# Patient Record
Sex: MALE | Race: WHITE | Employment: OTHER | URBAN - METROPOLITAN AREA
[De-identification: names, ages, dates, MRNs, and addresses within clinical notes are randomized per-mention and may not be internally consistent; named-entity substitution may affect disease eponyms.]

---

## 2019-02-06 RX ORDER — DOFETILIDE 0.5 MG/1
500 CAPSULE ORAL 2 TIMES DAILY
COMMUNITY
Start: 2018-01-19 | End: 2021-04-14

## 2019-02-06 RX ORDER — RANITIDINE 150 MG/1
150 TABLET ORAL DAILY PRN
COMMUNITY
End: 2021-04-14

## 2019-02-06 RX ORDER — MULTIVIT WITH MINERALS/LUTEIN
1000 TABLET ORAL DAILY
COMMUNITY
End: 2019-08-29

## 2019-02-06 RX ORDER — CARVEDILOL 6.25 MG/1
6.25 TABLET ORAL 2 TIMES DAILY
Status: ON HOLD | COMMUNITY
Start: 2017-05-15 | End: 2021-04-18 | Stop reason: HOSPADM

## 2019-02-06 RX ORDER — LEVOTHYROXINE SODIUM 0.07 MG/1
75 TABLET ORAL DAILY
COMMUNITY
Start: 2017-10-02

## 2019-02-06 RX ORDER — GABAPENTIN 100 MG/1
100 CAPSULE ORAL NIGHTLY PRN
COMMUNITY
End: 2019-05-30

## 2019-02-13 ENCOUNTER — INITIAL CONSULT (OUTPATIENT)
Dept: BARIATRICS/WEIGHT MGMT | Age: 64
End: 2019-02-13
Payer: MEDICARE

## 2019-02-13 VITALS
DIASTOLIC BLOOD PRESSURE: 109 MMHG | BODY MASS INDEX: 39.17 KG/M2 | TEMPERATURE: 96.4 F | RESPIRATION RATE: 20 BRPM | SYSTOLIC BLOOD PRESSURE: 173 MMHG | HEART RATE: 74 BPM | WEIGHT: 315 LBS | HEIGHT: 75 IN

## 2019-02-13 DIAGNOSIS — K91.2 MALNUTRITION FOLLOWING GASTROINTESTINAL SURGERY: Primary | ICD-10-CM

## 2019-02-13 PROCEDURE — 99204 OFFICE O/P NEW MOD 45 MIN: CPT | Performed by: SURGERY

## 2019-02-13 PROCEDURE — 99203 OFFICE O/P NEW LOW 30 MIN: CPT

## 2019-02-14 ENCOUNTER — PREP FOR PROCEDURE (OUTPATIENT)
Dept: SURGERY | Age: 64
End: 2019-02-14

## 2019-02-14 RX ORDER — SODIUM CHLORIDE, SODIUM LACTATE, POTASSIUM CHLORIDE, CALCIUM CHLORIDE 600; 310; 30; 20 MG/100ML; MG/100ML; MG/100ML; MG/100ML
INJECTION, SOLUTION INTRAVENOUS CONTINUOUS
Status: CANCELLED | OUTPATIENT
Start: 2019-02-14

## 2019-02-14 RX ORDER — 0.9 % SODIUM CHLORIDE 0.9 %
10 VIAL (ML) INJECTION PRN
Status: CANCELLED | OUTPATIENT
Start: 2019-02-14

## 2019-02-14 RX ORDER — 0.9 % SODIUM CHLORIDE 0.9 %
10 VIAL (ML) INJECTION EVERY 12 HOURS SCHEDULED
Status: CANCELLED | OUTPATIENT
Start: 2019-02-14

## 2019-03-08 ENCOUNTER — ANESTHESIA (OUTPATIENT)
Dept: ENDOSCOPY | Age: 64
End: 2019-03-08
Payer: MEDICARE

## 2019-03-08 ENCOUNTER — HOSPITAL ENCOUNTER (OUTPATIENT)
Age: 64
Setting detail: OUTPATIENT SURGERY
Discharge: HOME | End: 2019-03-08
Attending: SURGERY | Admitting: SURGERY
Payer: MEDICARE

## 2019-03-08 ENCOUNTER — ANESTHESIA EVENT (OUTPATIENT)
Dept: ENDOSCOPY | Age: 64
End: 2019-03-08
Payer: MEDICARE

## 2019-03-08 VITALS
HEIGHT: 75 IN | WEIGHT: 315 LBS | OXYGEN SATURATION: 94 % | HEART RATE: 59 BPM | RESPIRATION RATE: 20 BRPM | TEMPERATURE: 97.2 F | SYSTOLIC BLOOD PRESSURE: 146 MMHG | DIASTOLIC BLOOD PRESSURE: 84 MMHG | BODY MASS INDEX: 39.17 KG/M2

## 2019-03-08 VITALS
DIASTOLIC BLOOD PRESSURE: 96 MMHG | RESPIRATION RATE: 4 BRPM | SYSTOLIC BLOOD PRESSURE: 148 MMHG | OXYGEN SATURATION: 92 %

## 2019-03-08 DIAGNOSIS — K91.2 MALNUTRITION FOLLOWING GASTROINTESTINAL SURGERY: ICD-10-CM

## 2019-03-08 LAB
ALBUMIN SERPL-MCNC: 4.3 G/DL (ref 3.5–5.2)
ALP BLD-CCNC: 100 U/L (ref 40–129)
ALT SERPL-CCNC: 16 U/L (ref 0–40)
ANION GAP SERPL CALCULATED.3IONS-SCNC: 12 MMOL/L (ref 7–16)
AST SERPL-CCNC: 20 U/L (ref 0–39)
BILIRUB SERPL-MCNC: 0.6 MG/DL (ref 0–1.2)
BUN BLDV-MCNC: 14 MG/DL (ref 8–23)
CALCIUM SERPL-MCNC: 9.7 MG/DL (ref 8.6–10.2)
CHLORIDE BLD-SCNC: 102 MMOL/L (ref 98–107)
CHOLESTEROL, TOTAL: 199 MG/DL (ref 0–199)
CO2: 23 MMOL/L (ref 22–29)
CREAT SERPL-MCNC: 1 MG/DL (ref 0.7–1.2)
FERRITIN: 101 NG/ML
FOLATE: 11.5 NG/ML (ref 4.8–24.2)
GFR AFRICAN AMERICAN: >60
GFR NON-AFRICAN AMERICAN: >60 ML/MIN/1.73
GLUCOSE BLD-MCNC: 107 MG/DL (ref 74–99)
HCT VFR BLD CALC: 44.7 % (ref 37–54)
HEMOGLOBIN: 15 G/DL (ref 12.5–16.5)
MCH RBC QN AUTO: 29.2 PG (ref 26–35)
MCHC RBC AUTO-ENTMCNC: 33.6 % (ref 32–34.5)
MCV RBC AUTO: 87.1 FL (ref 80–99.9)
PDW BLD-RTO: 13.2 FL (ref 11.5–15)
PLATELET # BLD: 331 E9/L (ref 130–450)
PMV BLD AUTO: 9 FL (ref 7–12)
POTASSIUM SERPL-SCNC: 3.8 MMOL/L (ref 3.5–5)
PREALBUMIN: 25 MG/DL (ref 20–40)
RBC # BLD: 5.13 E12/L (ref 3.8–5.8)
SODIUM BLD-SCNC: 137 MMOL/L (ref 132–146)
TOTAL PROTEIN: 8.5 G/DL (ref 6.4–8.3)
TRIGL SERPL-MCNC: 134 MG/DL (ref 0–149)
VITAMIN B-12: 853 PG/ML (ref 211–946)
VITAMIN D 25-HYDROXY: 37 NG/ML (ref 30–100)
WBC # BLD: 5.6 E9/L (ref 4.5–11.5)

## 2019-03-08 PROCEDURE — 3609012400 HC EGD TRANSORAL BIOPSY SINGLE/MULTIPLE: Performed by: SURGERY

## 2019-03-08 PROCEDURE — 2709999900 HC NON-CHARGEABLE SUPPLY: Performed by: SURGERY

## 2019-03-08 PROCEDURE — 36415 COLL VENOUS BLD VENIPUNCTURE: CPT

## 2019-03-08 PROCEDURE — 43239 EGD BIOPSY SINGLE/MULTIPLE: CPT | Performed by: SURGERY

## 2019-03-08 PROCEDURE — 82746 ASSAY OF FOLIC ACID SERUM: CPT

## 2019-03-08 PROCEDURE — 82465 ASSAY BLD/SERUM CHOLESTEROL: CPT

## 2019-03-08 PROCEDURE — 84630 ASSAY OF ZINC: CPT

## 2019-03-08 PROCEDURE — 88342 IMHCHEM/IMCYTCHM 1ST ANTB: CPT

## 2019-03-08 PROCEDURE — 82306 VITAMIN D 25 HYDROXY: CPT

## 2019-03-08 PROCEDURE — 84478 ASSAY OF TRIGLYCERIDES: CPT

## 2019-03-08 PROCEDURE — 84425 ASSAY OF VITAMIN B-1: CPT

## 2019-03-08 PROCEDURE — 82607 VITAMIN B-12: CPT

## 2019-03-08 PROCEDURE — 99024 POSTOP FOLLOW-UP VISIT: CPT | Performed by: SURGERY

## 2019-03-08 PROCEDURE — 2580000003 HC RX 258: Performed by: SURGERY

## 2019-03-08 PROCEDURE — 7100000010 HC PHASE II RECOVERY - FIRST 15 MIN: Performed by: SURGERY

## 2019-03-08 PROCEDURE — 82728 ASSAY OF FERRITIN: CPT

## 2019-03-08 PROCEDURE — 85027 COMPLETE CBC AUTOMATED: CPT

## 2019-03-08 PROCEDURE — 3700000000 HC ANESTHESIA ATTENDED CARE: Performed by: SURGERY

## 2019-03-08 PROCEDURE — 7100000011 HC PHASE II RECOVERY - ADDTL 15 MIN: Performed by: SURGERY

## 2019-03-08 PROCEDURE — 80053 COMPREHEN METABOLIC PANEL: CPT

## 2019-03-08 PROCEDURE — 6360000002 HC RX W HCPCS: Performed by: NURSE ANESTHETIST, CERTIFIED REGISTERED

## 2019-03-08 PROCEDURE — 84134 ASSAY OF PREALBUMIN: CPT

## 2019-03-08 PROCEDURE — 88305 TISSUE EXAM BY PATHOLOGIST: CPT

## 2019-03-08 RX ORDER — PROPOFOL 10 MG/ML
INJECTION, EMULSION INTRAVENOUS CONTINUOUS PRN
Status: DISCONTINUED | OUTPATIENT
Start: 2019-03-08 | End: 2019-03-08 | Stop reason: SDUPTHER

## 2019-03-08 RX ORDER — SODIUM CHLORIDE, SODIUM LACTATE, POTASSIUM CHLORIDE, CALCIUM CHLORIDE 600; 310; 30; 20 MG/100ML; MG/100ML; MG/100ML; MG/100ML
INJECTION, SOLUTION INTRAVENOUS CONTINUOUS
Status: DISCONTINUED | OUTPATIENT
Start: 2019-03-08 | End: 2019-03-08 | Stop reason: HOSPADM

## 2019-03-08 RX ORDER — SODIUM CHLORIDE 0.9 % (FLUSH) 0.9 %
10 SYRINGE (ML) INJECTION PRN
Status: DISCONTINUED | OUTPATIENT
Start: 2019-03-08 | End: 2019-03-08 | Stop reason: HOSPADM

## 2019-03-08 RX ORDER — SODIUM CHLORIDE 0.9 % (FLUSH) 0.9 %
10 SYRINGE (ML) INJECTION EVERY 12 HOURS SCHEDULED
Status: DISCONTINUED | OUTPATIENT
Start: 2019-03-08 | End: 2019-03-08 | Stop reason: HOSPADM

## 2019-03-08 RX ADMIN — SODIUM CHLORIDE, POTASSIUM CHLORIDE, SODIUM LACTATE AND CALCIUM CHLORIDE: 600; 310; 30; 20 INJECTION, SOLUTION INTRAVENOUS at 08:10

## 2019-03-08 RX ADMIN — PROPOFOL 75 MCG/KG/MIN: 10 INJECTION, EMULSION INTRAVENOUS at 08:13

## 2019-03-08 ASSESSMENT — LIFESTYLE VARIABLES: SMOKING_STATUS: 0

## 2019-03-08 ASSESSMENT — PAIN SCALES - GENERAL
PAINLEVEL_OUTOF10: 0
PAINLEVEL_OUTOF10: 0

## 2019-03-08 ASSESSMENT — PAIN DESCRIPTION - DESCRIPTORS: DESCRIPTORS: ACHING;NUMBNESS

## 2019-03-08 ASSESSMENT — PAIN - FUNCTIONAL ASSESSMENT: PAIN_FUNCTIONAL_ASSESSMENT: 0-10

## 2019-03-08 ASSESSMENT — ENCOUNTER SYMPTOMS: SHORTNESS OF BREATH: 1

## 2019-03-11 LAB — VITAMIN B1 WHOLE BLOOD: 87 NMOL/L (ref 70–180)

## 2019-03-12 LAB — ZINC: 73 UG/DL (ref 60–120)

## 2019-03-20 ENCOUNTER — INITIAL CONSULT (OUTPATIENT)
Dept: BARIATRICS/WEIGHT MGMT | Age: 64
End: 2019-03-20
Payer: MEDICARE

## 2019-03-20 ENCOUNTER — TELEPHONE (OUTPATIENT)
Dept: BARIATRICS/WEIGHT MGMT | Age: 64
End: 2019-03-20

## 2019-03-20 ENCOUNTER — OFFICE VISIT (OUTPATIENT)
Dept: BARIATRICS/WEIGHT MGMT | Age: 64
End: 2019-03-20
Payer: MEDICARE

## 2019-03-20 VITALS
BODY MASS INDEX: 39.17 KG/M2 | DIASTOLIC BLOOD PRESSURE: 86 MMHG | HEART RATE: 61 BPM | SYSTOLIC BLOOD PRESSURE: 129 MMHG | HEIGHT: 75 IN | RESPIRATION RATE: 20 BRPM | TEMPERATURE: 97.3 F | WEIGHT: 315 LBS

## 2019-03-20 VITALS — BODY MASS INDEX: 39.17 KG/M2 | HEIGHT: 75 IN | WEIGHT: 315 LBS

## 2019-03-20 DIAGNOSIS — K21.9 GASTROESOPHAGEAL REFLUX DISEASE WITHOUT ESOPHAGITIS: Primary | ICD-10-CM

## 2019-03-20 DIAGNOSIS — Z71.3 DIETARY COUNSELING: Primary | ICD-10-CM

## 2019-03-20 PROCEDURE — 99211 OFF/OP EST MAY X REQ PHY/QHP: CPT

## 2019-03-20 PROCEDURE — 99213 OFFICE O/P EST LOW 20 MIN: CPT | Performed by: SURGERY

## 2019-03-20 PROCEDURE — 99999 PR OFFICE/OUTPT VISIT,PROCEDURE ONLY: CPT

## 2019-04-09 ENCOUNTER — INITIAL CONSULT (OUTPATIENT)
Dept: BARIATRICS/WEIGHT MGMT | Age: 64
End: 2019-04-09
Payer: MEDICARE

## 2019-04-09 DIAGNOSIS — Z71.3 NUTRITIONAL COUNSELING: Primary | ICD-10-CM

## 2019-04-09 PROCEDURE — 99999 PR OFFICE/OUTPT VISIT,PROCEDURE ONLY: CPT | Performed by: SURGERY

## 2019-04-10 VITALS — WEIGHT: 315 LBS | BODY MASS INDEX: 41.62 KG/M2

## 2019-04-10 NOTE — PROGRESS NOTES
WEIGHT:  Weight: (!) 333 lb (151 kg)      Pt was in th office for his/her 2nd weight Loss appointment. Pt is aware he/she must meet his/her pre-op weight loss goal in order to proceed with weight loss surgery. Lee / Harry faxed a copy of what was reviewed with the pt to her PCP. Pt verbalized understanding. Lee// Harry enc the following at today's visit for successful post-surgical Weight Maintenance    Education:  Patient has been educated on the importance of reading food labels for the content of sugar and the content of fat that is in the foods serving size contributing to overall calorie consumption. Patient is aware how to identify hidden sugars and hidden fats found in food and reduce calorie intake of empty calories and high fat foods. Patient can verbalize the importance of label reading. Demonstrate the difference between a healthy food label and unhealthy food label. Real life food replicas demonstrating hidden sugars and hidden fats, and actual food labels were part of the patients education to help understand healthy eating habits and determine healthy food choices. 1. Weigh yourself daily and record it. 2. Keep documented food records daily   3. Just be more active in day to day routine   4. Higher protein intake and a higher fiber intake. Not a high protein or a high fiber diet just a higher intake. 5.Eliminate empty calorie consumption    Lee Mcdonald addressed the following with the pt:  - Lee Mcdonald enc pt to comply with nutrition recommendations  - Lee Mcdonald enc Participation in support group meetings  - Lee Mcdonald enc pt to go back to maintenance of food records and weight monitoring records   - Lee Mcdonald reviewed the importance of adequate sleep and stress management  - Lee Mcdonald reviewed nonfood strategies to cope with emotions and stress  - Lee Mcdonald encouraged pt to practice the following: Mindful eating: Eating slowly:  Focusing   on the eating experience without distraction  - Lee Mcdonald enc. pt to pay attention to hunger and fullness  - Rd / Ld enc meal planning  - Rd / Ld  Enc pt to chose nutrient dense whole foods instead of soft, high calorie foods  - Rd / Ld enc not dr Simon Sat large amounts of fluids with or immediately after meals    Portion control ,meal planning and avoiding empty calorie consumption. Rd / Ld reviewed insurance company weight loss requirement on 4/10/19. Pt verbalized understanding. Please be aware at each visit you have been instructed that in order for your insurance company to approve your surgery you must show a consistent weight loss of 2 lbs or greater at each visit. We can not guarantee an approval by your insurance company we can only provide the information given to us it is up to you the patient to show compliancy to your insurance company.   If you do gain weight during your supervised weight loss counseling sessions insurance companies starting in 2018 are denying patients for not showing consistent weight loss results when part of a supervised weight loss counseling program.

## 2019-04-11 ENCOUNTER — HOSPITAL ENCOUNTER (OUTPATIENT)
Age: 64
Setting detail: OBSERVATION
Discharge: HOME | End: 2019-04-12
Attending: EMERGENCY MEDICINE | Admitting: INTERNAL MEDICINE
Payer: MEDICARE

## 2019-04-11 ENCOUNTER — APPOINTMENT (OUTPATIENT)
Dept: GENERAL RADIOLOGY | Age: 64
End: 2019-04-11
Payer: MEDICARE

## 2019-04-11 DIAGNOSIS — R07.9 CHEST PAIN, UNSPECIFIED TYPE: Primary | ICD-10-CM

## 2019-04-11 LAB
ANION GAP SERPL CALCULATED.3IONS-SCNC: 10 MMOL/L (ref 7–16)
BUN BLDV-MCNC: 11 MG/DL (ref 8–23)
CALCIUM SERPL-MCNC: 9.1 MG/DL (ref 8.6–10.2)
CHLORIDE BLD-SCNC: 104 MMOL/L (ref 98–107)
CO2: 25 MMOL/L (ref 22–29)
CREAT SERPL-MCNC: 0.8 MG/DL (ref 0.7–1.2)
D DIMER: <200 NG/ML DDU
EKG ATRIAL RATE: 61 BPM
EKG P AXIS: 91 DEGREES
EKG P-R INTERVAL: 220 MS
EKG Q-T INTERVAL: 432 MS
EKG QRS DURATION: 100 MS
EKG QTC CALCULATION (BAZETT): 434 MS
EKG R AXIS: -29 DEGREES
EKG T AXIS: 11 DEGREES
EKG VENTRICULAR RATE: 61 BPM
GFR AFRICAN AMERICAN: >60
GFR NON-AFRICAN AMERICAN: >60 ML/MIN/1.73
GLUCOSE BLD-MCNC: 104 MG/DL (ref 74–99)
HCT VFR BLD CALC: 41.8 % (ref 37–54)
HEMOGLOBIN: 13.6 G/DL (ref 12.5–16.5)
MCH RBC QN AUTO: 28.9 PG (ref 26–35)
MCHC RBC AUTO-ENTMCNC: 32.5 % (ref 32–34.5)
MCV RBC AUTO: 88.9 FL (ref 80–99.9)
PDW BLD-RTO: 13.5 FL (ref 11.5–15)
PLATELET # BLD: 326 E9/L (ref 130–450)
PMV BLD AUTO: 9.2 FL (ref 7–12)
POTASSIUM REFLEX MAGNESIUM: 3.7 MMOL/L (ref 3.5–5)
PRO-BNP: 126 PG/ML (ref 0–125)
RBC # BLD: 4.7 E12/L (ref 3.8–5.8)
SODIUM BLD-SCNC: 139 MMOL/L (ref 132–146)
TROPONIN: <0.01 NG/ML (ref 0–0.03)
TROPONIN: <0.01 NG/ML (ref 0–0.03)
WBC # BLD: 4.5 E9/L (ref 4.5–11.5)

## 2019-04-11 PROCEDURE — 36415 COLL VENOUS BLD VENIPUNCTURE: CPT

## 2019-04-11 PROCEDURE — 85378 FIBRIN DEGRADE SEMIQUANT: CPT

## 2019-04-11 PROCEDURE — 6370000000 HC RX 637 (ALT 250 FOR IP): Performed by: EMERGENCY MEDICINE

## 2019-04-11 PROCEDURE — 83880 ASSAY OF NATRIURETIC PEPTIDE: CPT

## 2019-04-11 PROCEDURE — G0378 HOSPITAL OBSERVATION PER HR: HCPCS

## 2019-04-11 PROCEDURE — 6370000000 HC RX 637 (ALT 250 FOR IP): Performed by: INTERNAL MEDICINE

## 2019-04-11 PROCEDURE — 85027 COMPLETE CBC AUTOMATED: CPT

## 2019-04-11 PROCEDURE — 84484 ASSAY OF TROPONIN QUANT: CPT

## 2019-04-11 PROCEDURE — 93005 ELECTROCARDIOGRAM TRACING: CPT | Performed by: EMERGENCY MEDICINE

## 2019-04-11 PROCEDURE — 2580000003 HC RX 258: Performed by: INTERNAL MEDICINE

## 2019-04-11 PROCEDURE — 80048 BASIC METABOLIC PNL TOTAL CA: CPT

## 2019-04-11 PROCEDURE — 99285 EMERGENCY DEPT VISIT HI MDM: CPT

## 2019-04-11 PROCEDURE — 71045 X-RAY EXAM CHEST 1 VIEW: CPT

## 2019-04-11 RX ORDER — NITROGLYCERIN 0.4 MG/1
0.4 TABLET SUBLINGUAL EVERY 5 MIN PRN
Status: DISCONTINUED | OUTPATIENT
Start: 2019-04-11 | End: 2019-04-12 | Stop reason: HOSPADM

## 2019-04-11 RX ORDER — ASCORBIC ACID 500 MG
1000 TABLET ORAL DAILY
Status: DISCONTINUED | OUTPATIENT
Start: 2019-04-12 | End: 2019-04-12 | Stop reason: HOSPADM

## 2019-04-11 RX ORDER — ASPIRIN 81 MG/1
324 TABLET, CHEWABLE ORAL ONCE
Status: COMPLETED | OUTPATIENT
Start: 2019-04-11 | End: 2019-04-11

## 2019-04-11 RX ORDER — ACETAMINOPHEN 325 MG/1
650 TABLET ORAL EVERY 4 HOURS PRN
Status: DISCONTINUED | OUTPATIENT
Start: 2019-04-11 | End: 2019-04-12 | Stop reason: HOSPADM

## 2019-04-11 RX ORDER — DOFETILIDE 0.5 MG/1
500 CAPSULE ORAL ONCE
Status: COMPLETED | OUTPATIENT
Start: 2019-04-11 | End: 2019-04-11

## 2019-04-11 RX ORDER — LEVOTHYROXINE SODIUM 0.07 MG/1
75 TABLET ORAL DAILY
Status: DISCONTINUED | OUTPATIENT
Start: 2019-04-11 | End: 2019-04-12 | Stop reason: HOSPADM

## 2019-04-11 RX ORDER — GABAPENTIN 100 MG/1
100 CAPSULE ORAL NIGHTLY PRN
Status: DISCONTINUED | OUTPATIENT
Start: 2019-04-11 | End: 2019-04-12 | Stop reason: HOSPADM

## 2019-04-11 RX ORDER — SODIUM CHLORIDE 0.9 % (FLUSH) 0.9 %
SYRINGE (ML) INJECTION
Status: DISPENSED
Start: 2019-04-11 | End: 2019-04-12

## 2019-04-11 RX ORDER — CARVEDILOL 6.25 MG/1
6.25 TABLET ORAL 2 TIMES DAILY
Status: DISCONTINUED | OUTPATIENT
Start: 2019-04-11 | End: 2019-04-12 | Stop reason: HOSPADM

## 2019-04-11 RX ORDER — CARVEDILOL 6.25 MG/1
6.25 TABLET ORAL ONCE
Status: COMPLETED | OUTPATIENT
Start: 2019-04-11 | End: 2019-04-11

## 2019-04-11 RX ORDER — SODIUM CHLORIDE 0.9 % (FLUSH) 0.9 %
10 SYRINGE (ML) INJECTION EVERY 12 HOURS SCHEDULED
Status: DISCONTINUED | OUTPATIENT
Start: 2019-04-11 | End: 2019-04-12 | Stop reason: HOSPADM

## 2019-04-11 RX ORDER — SODIUM CHLORIDE 0.9 % (FLUSH) 0.9 %
10 SYRINGE (ML) INJECTION PRN
Status: DISCONTINUED | OUTPATIENT
Start: 2019-04-11 | End: 2019-04-12 | Stop reason: HOSPADM

## 2019-04-11 RX ORDER — ONDANSETRON 2 MG/ML
4 INJECTION INTRAMUSCULAR; INTRAVENOUS EVERY 6 HOURS PRN
Status: DISCONTINUED | OUTPATIENT
Start: 2019-04-11 | End: 2019-04-12

## 2019-04-11 RX ORDER — DOFETILIDE 0.5 MG/1
500 CAPSULE ORAL 2 TIMES DAILY
Status: DISCONTINUED | OUTPATIENT
Start: 2019-04-11 | End: 2019-04-11

## 2019-04-11 RX ORDER — DOFETILIDE 0.5 MG/1
500 CAPSULE ORAL 2 TIMES DAILY
Status: DISCONTINUED | OUTPATIENT
Start: 2019-04-12 | End: 2019-04-12 | Stop reason: HOSPADM

## 2019-04-11 RX ORDER — DOFETILIDE 0.5 MG/1
500000 CAPSULE ORAL 2 TIMES DAILY
Status: DISCONTINUED | OUTPATIENT
Start: 2019-04-11 | End: 2019-04-11

## 2019-04-11 RX ADMIN — Medication 10 ML: at 22:27

## 2019-04-11 RX ADMIN — ACETAMINOPHEN 650 MG: 325 TABLET ORAL at 22:26

## 2019-04-11 RX ADMIN — DOFETILIDE 500 MCG: 0.5 CAPSULE ORAL at 17:26

## 2019-04-11 RX ADMIN — RIVAROXABAN 20 MG: 20 TABLET, FILM COATED ORAL at 16:59

## 2019-04-11 RX ADMIN — CARVEDILOL 6.25 MG: 6.25 TABLET, FILM COATED ORAL at 17:00

## 2019-04-11 RX ADMIN — ASPIRIN 81 MG 324 MG: 81 TABLET ORAL at 11:13

## 2019-04-11 RX ADMIN — LEVOTHYROXINE SODIUM 75 MCG: 75 TABLET ORAL at 22:18

## 2019-04-11 RX ADMIN — CARVEDILOL 6.25 MG: 6.25 TABLET, FILM COATED ORAL at 22:18

## 2019-04-11 ASSESSMENT — PAIN SCALES - GENERAL
PAINLEVEL_OUTOF10: 7
PAINLEVEL_OUTOF10: 0
PAINLEVEL_OUTOF10: 2
PAINLEVEL_OUTOF10: 4

## 2019-04-11 ASSESSMENT — PAIN DESCRIPTION - PROGRESSION
CLINICAL_PROGRESSION: GRADUALLY IMPROVING
CLINICAL_PROGRESSION: GRADUALLY IMPROVING

## 2019-04-11 ASSESSMENT — PAIN DESCRIPTION - ONSET
ONSET: ON-GOING
ONSET: SUDDEN

## 2019-04-11 ASSESSMENT — ENCOUNTER SYMPTOMS
CHEST TIGHTNESS: 0
SHORTNESS OF BREATH: 0

## 2019-04-11 ASSESSMENT — PAIN DESCRIPTION - DESCRIPTORS
DESCRIPTORS: DULL;SORE;ACHING
DESCRIPTORS: CRAMPING;ACHING;SORE

## 2019-04-11 ASSESSMENT — PAIN DESCRIPTION - PAIN TYPE
TYPE: ACUTE PAIN
TYPE: ACUTE PAIN

## 2019-04-11 ASSESSMENT — PAIN DESCRIPTION - LOCATION
LOCATION: CHEST
LOCATION: CHEST

## 2019-04-11 ASSESSMENT — PAIN DESCRIPTION - FREQUENCY
FREQUENCY: CONTINUOUS
FREQUENCY: CONTINUOUS

## 2019-04-11 ASSESSMENT — PAIN DESCRIPTION - ORIENTATION: ORIENTATION: MID

## 2019-04-11 ASSESSMENT — PAIN - FUNCTIONAL ASSESSMENT
PAIN_FUNCTIONAL_ASSESSMENT: PREVENTS OR INTERFERES SOME ACTIVE ACTIVITIES AND ADLS
PAIN_FUNCTIONAL_ASSESSMENT: PREVENTS OR INTERFERES SOME ACTIVE ACTIVITIES AND ADLS

## 2019-04-11 NOTE — ED PROVIDER NOTES
59-year-old male presenting with chest discomfort that began suddenly about an hour ago. He was sitting at home when this began. He describes it as being struck in the chest with a \"sledgehammer. \"  Chief complaint states that he was less short of breath but he denied this to me. He denied nausea and denied diaphoresis. He does have known cardiac history with A. shannan that he takes Xarelto. He is also on beta blocker and other anti-dysrhythmic medication. No distress currently and he states that the discomfort is easing up at this time. Review of Systems   Constitutional: Negative for chills and fever. Respiratory: Negative for chest tightness and shortness of breath. Cardiovascular: Positive for chest pain. Negative for palpitations. All other systems reviewed and are negative. Physical Exam   Constitutional: He is oriented to person, place, and time. He appears well-developed and well-nourished. No distress. HENT:   Head: Normocephalic and atraumatic. Eyes: Pupils are equal, round, and reactive to light. Neck: Normal range of motion. Neck supple. No thyromegaly present. Cardiovascular: Normal rate and regular rhythm. Pulmonary/Chest: Effort normal and breath sounds normal. No respiratory distress. He has no wheezes. Abdominal: Soft. He exhibits no distension and no mass. There is no tenderness. There is no rebound and no guarding. Musculoskeletal: Normal range of motion. He exhibits no edema or tenderness. Neurological: He is alert and oriented to person, place, and time. No cranial nerve deficit. Skin: Skin is warm and dry. No erythema. Psychiatric: He has a normal mood and affect. Procedures    [facility]    EKG: Sinus rhythm, first-degree AV block, rate 61, leftward axis, no ST elevations or depressions, no T-wave abnormalities, no signs of right heart strain, no bundle branch blocks.     ED Course as of Apr 22 2258   u Apr 11, 2019   1630 4:30 PM  I received this patient at sign out from Dr. Matheus Ramsay. I have discussed the patient's initial exam, treatment and plan of care with the out going physician. I have introduced my self to the patient / family and have answered their questions to this point. I have examined the patient myself and reviewed ordered tests / medications and  reviewed any available results to this point. If a resident is involved in the Emergency Department care, I have discussed my findings and plan with them as well. [NC]   8716 Patient sitting in bed resting comfortably in no distress. No current chest pain or palpitations or shortness of breath. Family is at bedside. Heart rate is regular at this time, he is awake and alert and oriented. Updated on current condition and awaiting bed at [facility]. We will repeat his troponin shortly. [NC]   1900 Patient is sitting in the bed resting comfortably in no distress, 2nd troponin is negative. Anticipated [facility] bed will not be available until sometime tomorrow afternoon as I am told. Patient states he is fine staying here for the cardiac workup now, he states to go ahead and admit him here. At this time I discussed the case with Dr. Antonio Mora who has already known about the patient, he will continue with admission here for further cardiac evaluation. [NC]      ED Course User Index  [NC] Washington Mason, DO       --------------------------------------------- PAST HISTORY ---------------------------------------------  Past Medical History:  has a past medical history of Arthritis, Atrial fibrillation ([address] Back pain, CPAP (continuous positive airway pressure) dependence, Difficulty swallowing, DJD (degenerative joint disease), GERD (gastroesophageal reflux disease), High cholesterol, Hx of cardiovascular stress test, Knee pain, Morbid obesity due to excess calories ([address] Non-stress test nonreactive, Problems with hearing, Sleep apnea, and SOBOE (shortness of breath on exertion).     Past Surgical History:  has a past surgical history that includes Appendectomy; shoulder surgery; knee surgery; Cholecystectomy; Colonoscopy; back surgery; Upper gastrointestinal endoscopy (03/08/2019); and Upper gastrointestinal endoscopy (N/A, 3/8/2019). Social History:  reports that he has never smoked. He has never used smokeless tobacco. He reports that he does not drink alcohol or use drugs. Family History: family history includes Cancer in his maternal grandmother; Dementia in his mother; Diabetes in his maternal grandfather; Heart Attack in his paternal grandfather and paternal grandmother; Heart Disease in his father; No Known Problems in his brother and sister. The patients home medications have been reviewed.     Allergies: Atorvastatin calcium and Lactose    -------------------------------------------------- RESULTS -------------------------------------------------    Lab  Results for orders placed or performed during the hospital encounter of 04/11/19   CBC   Result Value Ref Range    WBC 4.5 4.5 - 11.5 E9/L    RBC 4.70 3.80 - 5.80 E12/L    Hemoglobin 13.6 12.5 - 16.5 g/dL    Hematocrit 41.8 37.0 - 54.0 %    MCV 88.9 80.0 - 99.9 fL    MCH 28.9 26.0 - 35.0 pg    MCHC 32.5 32.0 - 34.5 %    RDW 13.5 11.5 - 15.0 fL    Platelets 115 824 - 918 E9/L    MPV 9.2 7.0 - 12.0 fL   Basic Metabolic Panel w/ Reflex to MG   Result Value Ref Range    Sodium 139 132 - 146 mmol/L    Potassium reflex Magnesium 3.7 3.5 - 5.0 mmol/L    Chloride 104 98 - 107 mmol/L    CO2 25 22 - 29 mmol/L    Anion Gap 10 7 - 16 mmol/L    Glucose 104 (H) 74 - 99 mg/dL    BUN 11 8 - 23 mg/dL    CREATININE 0.8 0.7 - 1.2 mg/dL    GFR Non-African American >60 >=60 mL/min/1.73    GFR African American >60     Calcium 9.1 8.6 - 10.2 mg/dL   Troponin   Result Value Ref Range    Troponin <0.01 0.00 - 0.03 ng/mL   Brain Natriuretic Peptide   Result Value Ref Range    Pro- (H) 0 - 125 pg/mL   D-Dimer, Quantitative   Result Value Ref Range    D-Dimer, Quant <200 ng/mL DDU   D-dimer, quantitative   Result Value Ref Range    D-Dimer, Quant <200 ng/mL DDU   Troponin   Result Value Ref Range    Troponin <0.01 0.00 - 0.03 ng/mL   CBC   Result Value Ref Range    WBC 5.2 4.5 - 11.5 E9/L    RBC 4.57 3.80 - 5.80 E12/L    Hemoglobin 13.4 12.5 - 16.5 g/dL    Hematocrit 40.3 37.0 - 54.0 %    MCV 88.2 80.0 - 99.9 fL    MCH 29.3 26.0 - 35.0 pg    MCHC 33.3 32.0 - 34.5 %    RDW 13.8 11.5 - 15.0 fL    Platelets 320 188 - 872 E9/L    MPV 9.2 7.0 - 12.0 fL   Comprehensive Metabolic Panel   Result Value Ref Range    Sodium 139 132 - 146 mmol/L    Potassium 3.7 3.5 - 5.0 mmol/L    Chloride 106 98 - 107 mmol/L    CO2 24 22 - 29 mmol/L    Anion Gap 9 7 - 16 mmol/L    Glucose 121 (H) 74 - 99 mg/dL    BUN 11 8 - 23 mg/dL    CREATININE 0.8 0.7 - 1.2 mg/dL    GFR Non-African American >60 >=60 mL/min/1.73    GFR African American >60     Calcium 8.8 8.6 - 10.2 mg/dL    Total Protein 7.3 6.4 - 8.3 g/dL    Alb 4.1 3.5 - 5.2 g/dL    Total Bilirubin 0.4 0.0 - 1.2 mg/dL    Alkaline Phosphatase 101 40 - 129 U/L    ALT 13 0 - 40 U/L    AST 18 0 - 39 U/L   TSH without Reflex   Result Value Ref Range    TSH 2.830 0.270 - 4.200 uIU/mL   Phosphorus   Result Value Ref Range    Phosphorus 1.7 (L) 2.5 - 4.5 mg/dL   Magnesium   Result Value Ref Range    Magnesium 2.2 1.6 - 2.6 mg/dL   Lipid Panel   Result Value Ref Range    Cholesterol, Total 212 (H) 0 - 199 mg/dL    Triglycerides 221 (H) 0 - 149 mg/dL    HDL 33 >40 mg/dL    LDL Calculated 135 (H) 0 - 99 mg/dL    VLDL Cholesterol Calculated 44 mg/dL   Hemoglobin A1C   Result Value Ref Range    Hemoglobin A1C 5.4 4.0 - 5.6 %   Troponin   Result Value Ref Range    Troponin <0.01 0.00 - 0.03 ng/mL   Troponin   Result Value Ref Range    Troponin <0.01 0.00 - 0.03 ng/mL   Magnesium   Result Value Ref Range    Magnesium 2.2 1.6 - 2.6 mg/dL   EKG 12 Lead   Result Value Ref Range    Ventricular Rate 61 BPM    Atrial Rate 61 BPM    P-R Interval 220 ms    QRS Duration 100 ms    Q-T Interval 432 ms    QTc Calculation (Bazett) 434 ms    P Axis 91 degrees    R Axis -29 degrees    T Axis 11 degrees   EKG 12 Lead   Result Value Ref Range    Ventricular Rate 52 BPM    Atrial Rate 52 BPM    P-R Interval 222 ms    QRS Duration 108 ms    Q-T Interval 492 ms    QTc Calculation (Bazett) 457 ms    P Axis 64 degrees    R Axis -38 degrees    T Axis -25 degrees       Radiology  NM Cardiac Stress Test Nuclear Imaging   Final Result   1. Pharmacologic stress myocardial perfusion imaging within normal   limits. 2. Stress induced ischemia is not demonstrated. WALL MOTION:      SPECT gated examination of myocardial contractility was performed in   the usual fashion in conjunction with the SPECT perfusion study. Left   ventricular chamber size is normal.  Left ventricular myocardium   demonstrates normal contractility at stress with expected thickening   of the left ventricular myocardium during systole. IMPRESSION:     1. Unremarkable study. EJECTION FRACTION:      SPECT gated images of the left ventricular myocardium were obtained   for purposes of left ventricular ejection fraction determination. Left ventricular ejection fraction is calculated at 70%. IMPRESSION:    1. LVEF equals 70%. XR CHEST PORTABLE   Final Result   1. Negative portable chest.              ------------------------- NURSING NOTES AND VITALS REVIEWED ---------------------------  Date / Time Roomed:  4/11/2019 10:23 AM  ED Bed Assignment:  [id number]    The nursing notes within the ED encounter and vital signs as below have been reviewed. No data found. Oxygen Saturation Interpretation: Normal      ------------------------------------------ PROGRESS NOTES ------------------------------------------  Re-evaluation(s):  Time: 1300. Patients symptoms are improving  Repeat physical examination is improved    Time: 1420.   Patients symptoms are improving  Repeat physical examination is improved    I have spoken with the patient and discussed todays results, in addition to providing specific details for the plan of care and counseling regarding the diagnosis and prognosis. Their questions are answered at this time and they are agreeable with the plan. I have discussed the risks and benefits of transfer and they wish to proceed with the transfer. --------------------------------- ADDITIONAL PROVIDER NOTES ---------------------------------  Consultations:  Spoke with CC (Cardiology). Discussed case. They will admit this patient. Spoke with Dr. Christofer Carter who is willing to admit the patient here but patient is requesting to go to [facility]. Reason for transfer: Cardiology, . This patient's ED course included: a personal history and physicial examination and re-evaluation prior to disposition    This patient has remained hemodynamically stable and been closely monitored during their ED course. Clinical Impression  1. Chest pain, unspecified type          Disposition  Patient's disposition: Transfer to [address] [place] [address]. Transferred by: EMS. Patient's condition is stable. ED Course as of Apr 22 2258   Thu Apr 11, 2019   1630 4:30 PM  I received this patient at sign out from Dr. Kelly Murray. I have discussed the patient's initial exam, treatment and plan of care with the out going physician. I have introduced my self to the patient / family and have answered their questions to this point. I have examined the patient myself and reviewed ordered tests / medications and  reviewed any available results to this point. If a resident is involved in the Emergency Department care, I have discussed my findings and plan with them as well. [NC]   3284 Patient sitting in bed resting comfortably in no distress. No current chest pain or palpitations or shortness of breath. Family is at bedside. Heart rate is regular at this time, he is awake and alert and oriented.  Updated on current condition and awaiting bed at [place]. We will repeat his troponin shortly. [NC]   1900 Patient is sitting in the bed resting comfortably in no distress, 2nd troponin is negative. Anticipated [facility] bed will not be available until sometime tomorrow afternoon as I am told. Patient states he is fine staying here for the cardiac workup now, he states to go ahead and admit him here. At this time I discussed the case with Dr. Gloria Martinez who has already known about the patient, he will continue with admission here for further cardiac evaluation.     [NC]      ED Course User Index  [NC] Jolene Cho, DO            [facility], DO  04/11/19 2305 Carmen [address]akes[address], DO  04/22/19 2377

## 2019-04-11 NOTE — H&P
pneumothorax. LUNG VOLUMES: Satisfactory inspirator effort. MEDIASTINAL STRUCTURES: No lymphadenopathy. Normal aortic contour. HEART SIZE: Normal. BONES AND SOFT TISSUES: No fracture or soft tissue abnormality. 1. Negative portable chest.        Review of Systems: All bolded are positive, all others are negative. General:  Fever, chills, diaphoresis, fatigue, malaise, night sweats, weight loss  Psychological:  Anxiety, disorientation, hallucinations. ENT:  Epistaxis, headaches, vertigo, visual changes. Cardiovascular:  Chest pain, irregular heartbeats, palpitations, paroxysmal nocturnal dyspnea. Respiratory:  Shortness of breath, coughing, sputum production, hemoptysis, wheezing, orthopnea.   Gastrointestinal:  Nausea, vomiting, diarrhea, heartburn, constipation, abdominal pain, hematemesis, hematochezia, melena, acholic stools  Genito-Urinary:  Dysuria, urgency, frequency, hematuria  Musculoskeletal:  Joint pain, joint stiffness, joint swelling, muscle pain  Neurology:  Headache, focal neurological deficits, weakness, numbness, paresthesia  Derm:  Rashes, ulcers, excoriations, bruising  Extremities:  Decreased ROM, peripheral edema, mottling    Past Medical Hx:  Past Medical History:   Diagnosis Date    Arthritis     Atrial fibrillation (HCC)     Back pain     CPAP (continuous positive airway pressure) dependence     setting 15    Difficulty swallowing     DJD (degenerative joint disease)     GERD (gastroesophageal reflux disease)     High cholesterol     Knee pain     Morbid obesity due to excess calories (HCC)     Non-stress test nonreactive     2011    Problems with hearing     Sleep apnea     SOBOE (shortness of breath on exertion)        Past Surgical Hx:  Past Surgical History:   Procedure Laterality Date    APPENDECTOMY      BACK SURGERY      laminectomy     CHOLECYSTECTOMY      COLONOSCOPY      KNEE SURGERY      both knees- scopes rt x2 left x 1    SHOULDER SURGERY      UPPER Never Smoker    Smokeless tobacco: Never Used   Substance and Sexual Activity    Alcohol use: No    Drug use: No    Sexual activity: Yes   Lifestyle    Physical activity:     Days per week: Not on file     Minutes per session: Not on file    Stress: Not on file   Relationships    Social connections:     Talks on phone: Not on file     Gets together: Not on file     Attends Congregation service: Not on file     Active member of club or organization: Not on file     Attends meetings of clubs or organizations: Not on file     Relationship status: Not on file    Intimate partner violence:     Fear of current or ex partner: Not on file     Emotionally abused: Not on file     Physically abused: Not on file     Forced sexual activity: Not on file   Other Topics Concern    Not on file   Social History Narrative    Not on file       Vitals:  BP (!) 174/102   Pulse 51   Temp 98.3 °F (36.8 °C) (Oral)   Resp 17   Ht 6' 3\" (1.905 m)   Wt (!) 346 lb (156.9 kg)   SpO2 96%   BMI 43.25 kg/m²     Physical Exam:  General: Awake, alert, oriented to person, place, time, and purpose, appears stated age, cooperative, no acute distress   Head: Normocephalic, atraumatic  Eyes: Conjunctivae/corneas clear, Sclera non icteric  Mouth: Mucous membranes moist  Neck: No JVD, no adenopathy,  neck is supple, trachea is midline  Back: ROM normal  Lungs: Clear to auscultation bilaterally. No retractions or use of accessory muscles. No vocal fremitus.  No rhonchi, crackle or rale, chest tender to palpation but does not reproduce chest pain  Heart: Regular rate and regular rhythm, no murmur, normal S1, S2  Abdomen: Soft, non-tender; bowel sounds normal; no masses, no organomegaly  Extremities: chronic right lower extremity edema, extremities atraumatic, no cyanosis, no clubbing, 2+ pedal pulses palpated  Skin: Normal color, normal texture, normal turgor, no rashes, no lesions  Neurologic:  Normal muscle tone throughout,  EOMI, face symmetric, equal facial muscle strength bilaterally, hearing intact,  tongue midline, Speech appropriate without slurring. Osteopathic/Structural Exam: Examined in seated and supine position. Normal thoracic kyphosis. Normal lumbar lordosis. No acute changes. Assessment and Plan     Patient is a 58 y.o. male who presented with chest pain. Assessment/Plan  1. Chest pain  2. R/o acs and PE  3. differential include muscle strain/costochondritis  4. Sinus bradycardia with first degree av block  5. Atrial fibrillation on xarelto  6. Sleep apnea  7. Obesity  8. HLD  9. GERD  10. Osteoarthritis     stress test 2-3 years ago. No cardiac cath post stress test. Family history of MI. Admit to telemetry. Cardiology consulted. Cycle troponin and repeat ekg. Obtain d-dimer. Place on cpap overnight. On xarelto for history of atrial fibrillation. If cardiac catheter indicated would like this done at [facility]. See orders for further plan of care. This patient was discussed with Dr. Shon Bee. Herminia Ortiz DO, PGY3  2:23 PM  4/11/2019       I discussed the patient's management with the resident. I reviewed the resident's note and agree with the documented findings and plan of care.     Angeles Hunt D.O., [facility]  7:00 PM  2/11/2019

## 2019-04-11 NOTE — ED PROVIDER NOTES
ED Course as of Apr 11 1901   u Apr 11, 2019   1630 4:30 PM  I received this patient at sign out from Dr. Uvaldo Soliz. I have discussed the patient's initial exam, treatment and plan of care with the out going physician. I have introduced my self to the patient / family and have answered their questions to this point. I have examined the patient myself and reviewed ordered tests / medications and  reviewed any available results to this point. If a resident is involved in the Emergency Department care, I have discussed my findings and plan with them as well. [NC]   7138 Patient sitting in bed resting comfortably in no distress. No current chest pain or palpitations or shortness of breath. Family is at bedside. Heart rate is regular at this time, he is awake and alert and oriented. Updated on current condition and awaiting bed at [facility]. We will repeat his troponin shortly. [NC]   1900 Patient is sitting in the bed resting comfortably in no distress, 2nd troponin is negative. Anticipated [facility] bed will not be available until sometime tomorrow afternoon as I am told. Patient states he is fine staying here for the cardiac workup now, he states to go ahead and admit him here. At this time I discussed the case with Dr. Jeffrey Stout who has already known about the patient, he will continue with admission here for further cardiac evaluation.     [NC]      ED Course User Index  [NC] Deanne Mason, DO       --------------------------------------------- PAST HISTORY ---------------------------------------------  Past Medical History:  has a past medical history of Arthritis, Atrial fibrillation ([address] Back pain, CPAP (continuous positive airway pressure) dependence, Difficulty swallowing, DJD (degenerative joint disease), GERD (gastroesophageal reflux disease), High cholesterol, Knee pain, Morbid obesity due to excess calories ([address] Non-stress test nonreactive, Problems with hearing, Sleep apnea, and SOBOE (shortness of breath on exertion). Past Surgical History:  has a past surgical history that includes Appendectomy; shoulder surgery; knee surgery; Cholecystectomy; Colonoscopy; back surgery; Upper gastrointestinal endoscopy (03/08/2019); and Upper gastrointestinal endoscopy (N/A, 3/8/2019). Social History:  reports that he has never smoked. He has never used smokeless tobacco. He reports that he does not drink alcohol or use drugs. Family History: family history includes Cancer in his maternal grandmother; Dementia in his mother; Diabetes in his maternal grandfather; Heart Attack in his paternal grandfather and paternal grandmother; Heart Disease in his father; No Known Problems in his brother and sister. The patients home medications have been reviewed.     Allergies: Atorvastatin calcium and Lactose    -------------------------------------------------- RESULTS -------------------------------------------------    LABS:  Results for orders placed or performed during the hospital encounter of 04/11/19   CBC   Result Value Ref Range    WBC 4.5 4.5 - 11.5 E9/L    RBC 4.70 3.80 - 5.80 E12/L    Hemoglobin 13.6 12.5 - 16.5 g/dL    Hematocrit 41.8 37.0 - 54.0 %    MCV 88.9 80.0 - 99.9 fL    MCH 28.9 26.0 - 35.0 pg    MCHC 32.5 32.0 - 34.5 %    RDW 13.5 11.5 - 15.0 fL    Platelets 084 906 - 373 E9/L    MPV 9.2 7.0 - 12.0 fL   Basic Metabolic Panel w/ Reflex to MG   Result Value Ref Range    Sodium 139 132 - 146 mmol/L    Potassium reflex Magnesium 3.7 3.5 - 5.0 mmol/L    Chloride 104 98 - 107 mmol/L    CO2 25 22 - 29 mmol/L    Anion Gap 10 7 - 16 mmol/L    Glucose 104 (H) 74 - 99 mg/dL    BUN 11 8 - 23 mg/dL    CREATININE 0.8 0.7 - 1.2 mg/dL    GFR Non-African American >60 >=60 mL/min/1.73    GFR African American >60     Calcium 9.1 8.6 - 10.2 mg/dL   Troponin   Result Value Ref Range    Troponin <0.01 0.00 - 0.03 ng/mL   Brain Natriuretic Peptide   Result Value Ref Range    Pro- (H) 0 - 125 pg/mL Troponin   Result Value Ref Range    Troponin <0.01 0.00 - 0.03 ng/mL   EKG 12 Lead   Result Value Ref Range    Ventricular Rate 61 BPM    Atrial Rate 61 BPM    P-R Interval 220 ms    QRS Duration 100 ms    Q-T Interval 432 ms    QTc Calculation (Bazett) 434 ms    P Axis 91 degrees    R Axis -29 degrees    T Axis 11 degrees       RADIOLOGY:  XR CHEST PORTABLE   Final Result   1. Negative portable chest.                ------------------------- NURSING NOTES AND VITALS REVIEWED ---------------------------  Date / Time Roomed:  4/11/2019 10:23 AM  ED Bed Assignment:  03/03    The nursing notes within the ED encounter and vital signs as below have been reviewed. Patient Vitals for the past 24 hrs:   BP Temp Temp src Pulse Resp SpO2 Height Weight   04/11/19 1814 (!) 173/100 -- -- 67 18 96 % -- --   04/11/19 1509 (!) 169/107 -- -- 59 18 98 % -- --   04/11/19 1239 (!) 174/102 -- -- 51 17 96 % -- --   04/11/19 1114 (!) 135/93 98.3 °F (36.8 °C) Oral 62 17 96 % -- --   04/11/19 1023 (!) 182/105 -- -- 62 20 97 % 6' 3\" (1.905 m) (!) 346 lb (156.9 kg)       Oxygen Saturation Interpretation: Normal        Counseling:  I have spoken with the patient and discussed todays results, in addition to providing specific details for the plan of care and counseling regarding the diagnosis and prognosis. Their questions are answered at this time and they are agreeable with the plan of admission. This patient's ED course included: a personal history and physicial examination, re-evaluation prior to disposition, cardiac monitoring and continuous pulse oximetry    This patient has remained hemodynamically stable during their ED course. Diagnosis:  1. Chest pain, unspecified type        Disposition:  Patient's disposition: Admit to telemetry  Patient's condition is stable.          Cruz Child DO  04/11/19 1909

## 2019-04-11 NOTE — ED NOTES
Spoke with kiko  From [facility] she called josemanuel.  Clinic there are no beds @ this time      Giana Fox  04/11/19 4734

## 2019-04-11 NOTE — ED NOTES
Received report from [facility]. Assumed care of pt. Agree with previous RN assessment. Pt and family updated on admission to hospital. Pt denies any CP at this time. Pt resting in bed with no new c/o at this time. NAD noted. Will continue to monitor.         Carla Esparza RN  04/11/19 6565

## 2019-04-12 ENCOUNTER — APPOINTMENT (OUTPATIENT)
Dept: NUCLEAR MEDICINE | Age: 64
End: 2019-04-12
Payer: MEDICARE

## 2019-04-12 VITALS
OXYGEN SATURATION: 95 % | WEIGHT: 315 LBS | BODY MASS INDEX: 39.17 KG/M2 | SYSTOLIC BLOOD PRESSURE: 142 MMHG | HEART RATE: 66 BPM | RESPIRATION RATE: 18 BRPM | HEIGHT: 75 IN | DIASTOLIC BLOOD PRESSURE: 78 MMHG | TEMPERATURE: 97.8 F

## 2019-04-12 LAB
ALBUMIN SERPL-MCNC: 4.1 G/DL (ref 3.5–5.2)
ALP BLD-CCNC: 101 U/L (ref 40–129)
ALT SERPL-CCNC: 13 U/L (ref 0–40)
ANION GAP SERPL CALCULATED.3IONS-SCNC: 9 MMOL/L (ref 7–16)
AST SERPL-CCNC: 18 U/L (ref 0–39)
BILIRUB SERPL-MCNC: 0.4 MG/DL (ref 0–1.2)
BUN BLDV-MCNC: 11 MG/DL (ref 8–23)
CALCIUM SERPL-MCNC: 8.8 MG/DL (ref 8.6–10.2)
CHLORIDE BLD-SCNC: 106 MMOL/L (ref 98–107)
CHOLESTEROL, TOTAL: 212 MG/DL (ref 0–199)
CO2: 24 MMOL/L (ref 22–29)
CREAT SERPL-MCNC: 0.8 MG/DL (ref 0.7–1.2)
D DIMER: <200 NG/ML DDU
GFR AFRICAN AMERICAN: >60
GFR NON-AFRICAN AMERICAN: >60 ML/MIN/1.73
GLUCOSE BLD-MCNC: 121 MG/DL (ref 74–99)
HBA1C MFR BLD: 5.4 % (ref 4–5.6)
HCT VFR BLD CALC: 40.3 % (ref 37–54)
HDLC SERPL-MCNC: 33 MG/DL
HEMOGLOBIN: 13.4 G/DL (ref 12.5–16.5)
LDL CHOLESTEROL CALCULATED: 135 MG/DL (ref 0–99)
LV EF: 70 %
LVEF MODALITY: NORMAL
MAGNESIUM: 2.2 MG/DL (ref 1.6–2.6)
MAGNESIUM: 2.2 MG/DL (ref 1.6–2.6)
MCH RBC QN AUTO: 29.3 PG (ref 26–35)
MCHC RBC AUTO-ENTMCNC: 33.3 % (ref 32–34.5)
MCV RBC AUTO: 88.2 FL (ref 80–99.9)
PDW BLD-RTO: 13.8 FL (ref 11.5–15)
PHOSPHORUS: 1.7 MG/DL (ref 2.5–4.5)
PLATELET # BLD: 318 E9/L (ref 130–450)
PMV BLD AUTO: 9.2 FL (ref 7–12)
POTASSIUM SERPL-SCNC: 3.7 MMOL/L (ref 3.5–5)
RBC # BLD: 4.57 E12/L (ref 3.8–5.8)
SODIUM BLD-SCNC: 139 MMOL/L (ref 132–146)
TOTAL PROTEIN: 7.3 G/DL (ref 6.4–8.3)
TRIGL SERPL-MCNC: 221 MG/DL (ref 0–149)
TROPONIN: <0.01 NG/ML (ref 0–0.03)
TROPONIN: <0.01 NG/ML (ref 0–0.03)
TSH SERPL DL<=0.05 MIU/L-ACNC: 2.83 UIU/ML (ref 0.27–4.2)
VLDLC SERPL CALC-MCNC: 44 MG/DL
WBC # BLD: 5.2 E9/L (ref 4.5–11.5)

## 2019-04-12 PROCEDURE — 3430000000 HC RX DIAGNOSTIC RADIOPHARMACEUTICAL: Performed by: RADIOLOGY

## 2019-04-12 PROCEDURE — 83036 HEMOGLOBIN GLYCOSYLATED A1C: CPT

## 2019-04-12 PROCEDURE — 85027 COMPLETE CBC AUTOMATED: CPT

## 2019-04-12 PROCEDURE — 94660 CPAP INITIATION&MGMT: CPT

## 2019-04-12 PROCEDURE — 93017 CV STRESS TEST TRACING ONLY: CPT

## 2019-04-12 PROCEDURE — 6370000000 HC RX 637 (ALT 250 FOR IP): Performed by: INTERNAL MEDICINE

## 2019-04-12 PROCEDURE — 84100 ASSAY OF PHOSPHORUS: CPT

## 2019-04-12 PROCEDURE — G0378 HOSPITAL OBSERVATION PER HR: HCPCS

## 2019-04-12 PROCEDURE — APPSS60 APP SPLIT SHARED TIME 46-60 MINUTES: Performed by: NURSE PRACTITIONER

## 2019-04-12 PROCEDURE — 93016 CV STRESS TEST SUPVJ ONLY: CPT | Performed by: INTERNAL MEDICINE

## 2019-04-12 PROCEDURE — 2580000003 HC RX 258: Performed by: INTERNAL MEDICINE

## 2019-04-12 PROCEDURE — 6360000002 HC RX W HCPCS: Performed by: INTERNAL MEDICINE

## 2019-04-12 PROCEDURE — 36415 COLL VENOUS BLD VENIPUNCTURE: CPT

## 2019-04-12 PROCEDURE — 78452 HT MUSCLE IMAGE SPECT MULT: CPT

## 2019-04-12 PROCEDURE — 93018 CV STRESS TEST I&R ONLY: CPT | Performed by: INTERNAL MEDICINE

## 2019-04-12 PROCEDURE — 80061 LIPID PANEL: CPT

## 2019-04-12 PROCEDURE — 99222 1ST HOSP IP/OBS MODERATE 55: CPT | Performed by: INTERNAL MEDICINE

## 2019-04-12 PROCEDURE — A9500 TC99M SESTAMIBI: HCPCS | Performed by: RADIOLOGY

## 2019-04-12 PROCEDURE — 80053 COMPREHEN METABOLIC PANEL: CPT

## 2019-04-12 PROCEDURE — 84443 ASSAY THYROID STIM HORMONE: CPT

## 2019-04-12 PROCEDURE — 85378 FIBRIN DEGRADE SEMIQUANT: CPT

## 2019-04-12 PROCEDURE — 83735 ASSAY OF MAGNESIUM: CPT

## 2019-04-12 PROCEDURE — 84484 ASSAY OF TROPONIN QUANT: CPT

## 2019-04-12 RX ADMIN — Medication 10 MILLICURIE: at 07:57

## 2019-04-12 RX ADMIN — CARVEDILOL 6.25 MG: 6.25 TABLET, FILM COATED ORAL at 11:07

## 2019-04-12 RX ADMIN — OXYCODONE HYDROCHLORIDE AND ACETAMINOPHEN 1000 MG: 500 TABLET ORAL at 11:07

## 2019-04-12 RX ADMIN — REGADENOSON 0.4 MG: 0.08 INJECTION, SOLUTION INTRAVENOUS at 09:21

## 2019-04-12 RX ADMIN — DOFETILIDE 500 MCG: 0.5 CAPSULE ORAL at 11:07

## 2019-04-12 RX ADMIN — LEVOTHYROXINE SODIUM 75 MCG: 75 TABLET ORAL at 11:07

## 2019-04-12 RX ADMIN — Medication 10 ML: at 11:06

## 2019-04-12 ASSESSMENT — PAIN SCALES - GENERAL
PAINLEVEL_OUTOF10: 0
PAINLEVEL_OUTOF10: 0

## 2019-04-12 NOTE — CONSULTS
Inpatient Cardiology Consultation      Reason for Consult:  Chest pain    Consulting Physician: Dr. Glenroy Skelton    Requesting Physician: Dr. Katie Hernandez    Date of Consultation: 4/12/2019    HISTORY OF PRESENT ILLNESS:     This 58-year-old male is not known to [facility] cardiology. He has been evaluated by  in the past but is now requesting [facility] cardiology. He certainly is followed by cardiology at the [facility] for paroxysmal atrial fibrillation. He is being evaluated for gastric bypass surgery and he states he was given cardiac clearance by his cardiologist in February. He's been in his usual state of health. Yesterday he was sitting and talking on the phone with his neck flexed to the right holding the phone near his ear. Then his cat jumped on the opposite shoulder and he went to push the cat off and he suddenly developed a severe cramp in his left upper chest.  This severe pain lasted about 15 minutes and he had never had anything like it in the past. The pain was so severe that he could not move. After this severe pain had resolved he had a tenderness that was worse with movement and deep inspiration and leaning forward. In the emergency room an EKG showed sinus bradycardia with left axis deviation. Blood pressure on admission was 182/105 .3 troponins are negative. A stress test has been ordered by primary care. Chest x-ray was unremarkable. PAST MEDICAL HISTORY  1. Obesity  2. Hypertension  3. Obstructive sleep apnea  4. Hypercholesterolemia  5. Paroxysmal atrial fibrillation with a history of cardioversions/ currently on Tikosyn and Xarelto and followed by the [facility]  6. 24-hour Holter monitor May 5, 2016 at the [facility]  7. Carotid ultrasound December 11, 2014 at the [facility], no significant stenosis  8. No cardiac testing available from [facility] records  9. Osteoarthritis and status post arthroscopic knee surgery  10. GERD  11.  Neuropathy of the lower extremities and unable to tolerate Neurontin/gait disturbance and uses a cane  12. Chronic back pain  13. Back surgery  14. Hard of hearing  15. Endoscopy March 8, 2019 with biopsy  16. Shoulder surgery, he denied  17. Cholecystectomy  18. Appendectomy  19. Colonoscopy      FAM RENU HISTORY-mother had dementia and father had heart disease    SOCIAL HISTORY-nonsmoker nondrinker and is       Current Medications:    Current Facility-Administered Medications: regadenoson (LEXISCAN) injection 0.4 mg, 0.4 mg, Intravenous, ONCE PRN  nitroGLYCERIN (NITROSTAT) SL tablet 0.4 mg, 0.4 mg, Sublingual, Q5 Min PRN  aspirin EC tablet 325 mg, 325 mg, Oral, Daily  sodium chloride flush 0.9 % injection 10 mL, 10 mL, Intravenous, 2 times per day  sodium chloride flush 0.9 % injection 10 mL, 10 mL, Intravenous, PRN  acetaminophen (TYLENOL) tablet 650 mg, 650 mg, Oral, Q4H PRN  carvedilol (COREG) tablet 6.25 mg, 6.25 mg, Oral, BID  gabapentin (NEURONTIN) capsule 100 mg, 100 mg, Oral, Nightly PRN  levothyroxine (SYNTHROID) tablet 75 mcg, 75 mcg, Oral, Daily  rivaroxaban (XARELTO) tablet 20 mg, 20 mg, Oral, Daily  vitamin C (ASCORBIC ACID) tablet 1,000 mg, 1,000 mg, Oral, Daily  dofetilide (TIKOSYN) capsule 500 mcg, 500 mcg, Oral, BID    Allergies:  Atorvastatin calcium and Lactose    REVIEW OF SYSTEMS:     · Constitutional: Denies fatigue, fevers, chills or night sweats  · Eyes: Denies visual changes or drainage  · ENT: Denies headaches or hearing loss. No mouth sores or sore throat. No epistaxis   · Cardiovascular: Denies chest pain, pressure or palpitations. No lower extremity swelling. · Respiratory: Denies NICKERSON, cough, orthopnea or PND. No hemoptysis   · Gastrointestinal: Denies hematemesis or anorexia. No hematochezia or melena    · Genitourinary: Denies urgency, dysuria or hematuria.   · Musculoskeletal: admits to gait disturbance, weakness and pain in the lower extremities which prevents him from standing more than 10 minutes 04/11/19  1053      K 3.7   CO2 25   BUN 11   CREATININE 0.8   LABGLOM >60   CALCIUM 9.1     Mag:   Recent Labs     04/12/19  0045   MG 2.2     Phos: No results for input(s): PHOS in the last 72 hours. TSH: No results for input(s): TSH in the last 72 hours. HgA1c:   Lab Results   Component Value Date    LABA1C 5.5 10/24/2013     No results found for: EAG  proBNP:   Recent Labs     04/11/19  1053   PROBNP 126*     PT/INR: No results for input(s): PROTIME, INR in the last 72 hours. APTT:No results for input(s): APTT in the last 72 hours. CARDIAC ENZYMES:  Recent Labs     04/11/19  1053 04/11/19  1820 04/12/19  0040   TROPONINI <0.01 <0.01 <0.01     FASTING LIPID PANEL:  Lab Results   Component Value Date    CHOL 199 03/08/2019    HDL 39.0 10/24/2013    LDLCALC 170 10/24/2013    TRIG 134 03/08/2019     LIVER PROFILE:No results for input(s): AST, ALT, LABALBU in the last 72 hours. Electronically signed by NATHALIE Mantilla CNP on 4/12/2019 at 9:10 AM     [address], [address] [place]    Reason for consult: Chest pain     Patient previously known to [facility] .      History of Present illness:  Admitted for chest pain, atypica;, need gastric bypass next month; Hx of A Fib on Xarelto. Mild SOB due t oobesity, +ve right knee pain-uses cane     Review of systems:  Review of 10 systems negative except as mentioned in the HPI     Medical History: Reviewed     Surgical history: Reviewed     FamilyHistory: Reviewed     Allergies:  Reviewed     Social Hx: Reviewed.     Medications: reviewed.     Labs/imaging studies: Reviewed.     Above ELENA exam, assessment reviewed and reflect my work. I personally saw, examined, and evaluated the patient today.     I personally reviewed the medications, rhythm strips, pertinent labs and test reports.     I directly participated in the medical-decision making, ordering pertinent tests and medication adjustment.     Physical exam:          Vitals:     04/12/19 0750   BP: (!) 144/72   Pulse: 77   Resp: 18   Temp: 97.8 °F (36.6 °C)   SpO2: 95%         In general, this is a well developed, well nourished who appears stated age. awake, alert, cooperative, no apparent distress     HEENT: eyes -conjunctivae pink,   Neck-  no stridor, no carotid bruit. no jugular venous distention   RESPIRATORY: Chest symmetrical and non-tender to palpation. No accessory muscles use. Lung auscultation - clear to auscultation except few rhonchi  CARDIOVASCULAR:     Heart Inspection shows no noted pulsations  Heart Palpation - no palpable thrills  Heart Ausculation - Regular rate and rhythm, 1/6 systolic murmur, No s3 or rub. No lower extremity edema, no varicosities. Distal pulses palpable, no clubbing or cyanosis   ABDOMEN: Soft, Obese,  Bowel sounds present. MS: n/a.   : Deferred  Rectal Exam: Deferred  SKIN: warm and dry  NEURO / PSYCH: oriented to person, place           Impression/Recommendations:     Chest pain - MI ruled out, Lexiscan stress today     PAF - In Sinus, Continue Xarelto     Morbid obesity - Lost few Lbs - For gastric bypass next month     NICKERSON, mild, due to obesity - NO acute CHF     ABENA     GERD     Chronic right knee pain                 If stress test normal, cardiology sign off     Thank you for the consult.           Tatiana Grajeda MD  4/12/2019  [facility]) Cardiology

## 2019-04-12 NOTE — PLAN OF CARE
Problem: Falls - Risk of:  Goal: Will remain free from falls  Description  Will remain free from falls  4/12/2019 0858 by Megan Nuñez RN  Outcome: Met This Shift  4/12/2019 0417 by Duglas Rodriguez RN  Outcome: Met This Shift  Goal: Absence of physical injury  Description  Absence of physical injury  4/12/2019 0858 by Megan Nuñez RN  Outcome: Met This Shift  4/12/2019 0417 by Duglas Rodriguez RN  Outcome: Met This Shift     Problem: Discharge Planning:  Goal: Discharged to appropriate level of care  Description  Discharged to appropriate level of care  Outcome: Met This Shift     Problem: Pain:  Goal: Pain level will decrease  Description  Pain level will decrease  Outcome: Met This Shift  Goal: Control of acute pain  Description  Control of acute pain  Outcome: Met This Shift  Goal: Control of chronic pain  Description  Control of chronic pain  Outcome: Met This Shift   Continue current treatment

## 2019-04-12 NOTE — PROGRESS NOTES
[facility] Quality Flow/Interdisciplinary Rounds Progress Note        Quality Flow Rounds held on April 12, 2019    Disciplines Attending:  Bedside Nurse, ,  and Nursing Unit [address] [place] [address] was admitted on 4/11/2019 10:23 AM    Anticipated Discharge Date:  Expected Discharge Date: 04/14/19    Disposition:    Abram Score:  Abram Scale Score: 20    Readmission Risk              Risk of Unplanned Readmission:        8           Discussed patient goal for the day, patient clinical progression, and barriers to discharge.   The following Goal(s) of the Day/Commitment(s) have been identified:  Activity progression, OBS, STress,       Myles Sieving  April 12, 2019

## 2019-04-12 NOTE — CONSULTS
Reason for consult: Chest pain    Patient previously known to [facility] . History of Present illness:  Admitted for chest pain, atypica;, need gastric bypass next month; Hx of A Fib on Xarelto. Mild SOB due t oobesity, +ve right knee pain-uses cane    Review of systems:  Review of 10 systems negative except as mentioned in the HPI    Medical History: Reviewed    Surgical history: Reviewed    FamilyHistory: Reviewed    Allergies:  Reviewed    Social Hx: Reviewed. Medications: reviewed. Labs/imaging studies: Reviewed. Above ELENA exam, assessment reviewed and reflect my work. I personally saw, examined, and evaluated the patient today. I personally reviewed the medications, rhythm strips, pertinent labs and test reports. I directly participated in the medical-decision making, ordering pertinent tests and medication adjustment. Physical exam:     Vitals:    04/12/19 0750   BP: (!) 144/72   Pulse: 77   Resp: 18   Temp: 97.8 °F (36.6 °C)   SpO2: 95%       In general, this is a well developed, well nourished who appears stated age. awake, alert, cooperative, no apparent distress    HEENT: eyes -conjunctivae pink,   Neck-  no stridor, no carotid bruit. no jugular venous distention   RESPIRATORY: Chest symmetrical and non-tender to palpation. No accessory muscles use. Lung auscultation - clear to auscultation except few rhonchi  CARDIOVASCULAR:     Heart Inspection shows no noted pulsations  Heart Palpation - no palpable thrills  Heart Ausculation - Regular rate and rhythm, 1/6 systolic murmur, No s3 or rub. No lower extremity edema, no varicosities. Distal pulses palpable, no clubbing or cyanosis   ABDOMEN: Soft, Obese,  Bowel sounds present. MS: n/a.   : Deferred  Rectal Exam: Deferred  SKIN: warm and dry  NEURO / PSYCH: oriented to person, place        Impression/Recommendations:    Chest pain - MI ruled out, Lexiscan stress today    PAF - In Sinus, Continue Xarelto    Morbid obesity -

## 2019-04-13 NOTE — DISCHARGE SUMMARY
[address]                               DISCHARGE SUMMARY    PATIENT NAME: Naif Asher                     :        1955  MED REC NO:   [id number]                            ROOM:       3055  ACCOUNT NO:   [de-identified]                           ADMIT DATE: 2019  PROVIDER:     Nba Puentes DO                  DISCHARGE DATE:  2019      ADMITTING DIAGNOSIS:  Chest pain. FINAL DISCHARGE DIAGNOSES:  Chest pain, myocardial infarction ruled out  with negative Lexiscan stress test.    SECONDARY DISCHARGE DIAGNOSES:  Obstructive sleep apnea; hyperlipidemia;  paroxysmal atrial fibrillation with history of cardioversion, currently  on Tikosyn and Xarelto, being followed by [facility]; super morbid  obesity with BMI of 43; osteoarthritis with status post knee surgery;  gastroesophageal reflux disease; chronic back pain; hearing impairment. COMPLICATIONS:  None. OPERATIONS:  None. CONSULTATION OBTAINED:  [facility]. No OMT was given. ADMITTING PHYSICIANS:  Dr. Carlos Enrique Lorenzo and Dr. Renetta Espitia. CHIEF COMPLAINT AND HISTORY OF CHIEF COMPLAINT:  A pleasant 58-year-old  white male who was admitted to [address] [place] [address]. The patient  presented to the hospital here on 2019. The patient presented to  the hospital here through the emergency room with what appeared to be  chest pain. The patient states he experienced this at about 9:30 in the  morning. The patient was talking on the  phone, at which time a cat  jumped on his chest.  He went to put the cat away across his chest.  The  patient presented to the hospital here, was seen and evaluated and  subsequently admitted to the hospital here under the service of Dr. Carlos Enrique Lorenzo and Dr. Renetta Espitia with the diagnosis of chest pain, rule out injury. The patient initially wanted to go to [place], but decided to stay  here for further testing.   He daily, Coreg 6.25 b.i.d.,  Tikosyn 500 mg b.i.d., gabapentin 100 nightly as needed, Synthroid 75  daily, Zantac 150 daily, Xarelto 20 mg daily, and vitamin C 1000 units  daily. The patient was informed of the limitations of the tests. More than 30 minutes were spent on the day of discharge with more than  50% of the time spent face-to-face with the patient discussing plan of  care, limitations of his tests. The patient will follow up with the  primary care doctor and Cardiology at the clinic.         Emelina Campo DO    D: 04/12/2019 15:29:54       T: 04/13/2019 14:43:03     [email or web address]  Job#: [id number]     Doc#: [id number]    CC:

## 2019-04-16 ENCOUNTER — TELEPHONE (OUTPATIENT)
Dept: BARIATRICS/WEIGHT MGMT | Age: 64
End: 2019-04-16

## 2019-04-16 NOTE — TELEPHONE ENCOUNTER
Pt Chris Pena scheduled Ivonne Lamb for Final Dietary Appt for 5/8/19 at 0900 and RYGB Class from 1000 - 1200 on this same day. Per pt mark and Stefany Cruz do not want to have sx on the same day but do want to have sx around the same time.

## 2019-04-17 LAB
EKG ATRIAL RATE: 52 BPM
EKG P AXIS: 64 DEGREES
EKG P-R INTERVAL: 222 MS
EKG Q-T INTERVAL: 492 MS
EKG QRS DURATION: 108 MS
EKG QTC CALCULATION (BAZETT): 457 MS
EKG R AXIS: -38 DEGREES
EKG T AXIS: -25 DEGREES
EKG VENTRICULAR RATE: 52 BPM

## 2019-05-08 ENCOUNTER — TELEPHONE (OUTPATIENT)
Dept: BARIATRICS/WEIGHT MGMT | Age: 64
End: 2019-05-08

## 2019-05-08 ENCOUNTER — INITIAL CONSULT (OUTPATIENT)
Dept: BARIATRICS/WEIGHT MGMT | Age: 64
End: 2019-05-08
Payer: MEDICARE

## 2019-05-08 VITALS — WEIGHT: 315 LBS | BODY MASS INDEX: 39.17 KG/M2 | HEIGHT: 75 IN

## 2019-05-08 DIAGNOSIS — Z71.3 NUTRITIONAL COUNSELING: Primary | ICD-10-CM

## 2019-05-08 PROCEDURE — 99999 PR OFFICE/OUTPT VISIT,PROCEDURE ONLY: CPT | Performed by: SURGERY

## 2019-05-08 NOTE — PROGRESS NOTES
WEIGHT:  Weight: (!) 338 lb (153.3 kg)      Pt was in th office for His 3rd weight Loss appointment. Pt gained 5 lbs since His last appointment and has lost 13% of her EBW. Pt is down 21 lbs since starting. Rd / Ld educated the pt on Mindful versus Mindless Eating. Pt is aware He must meet His pre-op weight loss goal of 344 lbs in order to proceed with weight loss surgery. Rd / Ld faxed a copy of what was reviewed with the pt to her PCP. Pt verbalized understanding. Rd / Ld reviewed insurance company weight loss requirement on 5/8/19. Pt verbalized understanding. Please be aware at each visit you have been instructed that in order for your insurance company to approve your surgery you must show a consistent weight loss of 2 lbs or greater at each visit. We can not guarantee an approval by your insurance company we can only provide the information given to us it is up to you the patient to show compliancy to your insurance company.   If you do gain weight during your supervised weight loss counseling sessions insurance companies starting in 2018 are denying patients for not showing consistent weight loss results when part of a supervised weight loss counseling program.

## 2019-05-08 NOTE — TELEPHONE ENCOUNTER
Last Dietary Appointment Notes: 5/8/19    [place]: [facility]    Surgery Requested by Patient: As of 5/8/19 - RYGB     Date: 2 Hour Nutrition Class: RYGB Class Wednesday, May 8th 2019 - 10:00 - 12:00     Rd / Ld reviewed the following with the patient:    Hebert Ellison / Harry at the [facility] reviewed with the patient that he / she has not completed the following in order to proceed with bariatric surgery:   -Everything is complete    Rd / Ld at the [facility] reviewed with the patient that he / she is at his / her goal weight for surgery and can not gain weight from now until surgery:  Yes. Patient is aware weight gain at H&P will cancel the patients surgery date. Pre-Op Weight Loss Goal: 344 lbs. Pt currently weighs 338 lbs. Rd / Ld reviewed with the patient that he / she must purchase a 3 month supply of supplements before his / her surgery or at the time of his / her H&P appointment and the patient states he / she is going to purchase the 3 month supply of supplements on H&P. Patient is aware that failure to purchase the supplements at this appointment will cancel the patients surgery date. Patient states at this time he / she  does not see a Counselor and or Psychologist and or Psychiatrist at this time. Patient states he / she is not taking the following psychological medication. Patient states at this time from the time of his / her initial consult here at the [facility] there has been no changes in his / her medical history. Patient is aware failure to disclose information can lead to his / her surgery being cancelled. Patient received a copy of this at the time of his / her final dietary consult.

## 2019-05-08 NOTE — LETTER
[facility] Surg Weight   [address]  Phone: [phone]  Fax: [phone]    Reji Gauthier RD, LD        May 9, 2019    Óscar Cooper [address] [place] [id number]      Dear Arleth Shine:        I personally wanted to thank you for selecting [facility] as your center of choice for surgery. I wanted to take the time to let you know it means a lot to me to be able to work with you both before and after surgery and I am so glad that you will become part of our surgical family. It has been a privilege to get to know you and become part of your new journey on life. I look forward to working with you in the future and helping you achieve your new goals. I can't wait to see the growth and transformation that occurs for you after your surgery. If at any time you have any questions you can always contact me [phone].      Respectfully,          Reji Gauthier RDN/ SUSHMA  Bariatric Dietitian  [facility]  Certified In Adult Weight Management I and II  Certified In Pediatric and Adolescent Weight Management    Reji Gauthier RD, SUSHMA

## 2019-05-09 NOTE — PROGRESS NOTES
Patient attended a 2 Hour Initial Nutrition Consult Class for RYGB on 5/9/19. Patient was able to verbalize understanding of the class.       Results have been scanned into Epic and placed on chart for RN to review with pt at H&P.

## 2019-05-13 ENCOUNTER — OFFICE VISIT (OUTPATIENT)
Dept: SURGERY | Age: 64
End: 2019-05-13

## 2019-05-13 ENCOUNTER — TELEPHONE (OUTPATIENT)
Dept: SURGERY | Age: 64
End: 2019-05-13

## 2019-05-13 VITALS
WEIGHT: 315 LBS | BODY MASS INDEX: 39.17 KG/M2 | HEIGHT: 75 IN | HEART RATE: 79 BPM | OXYGEN SATURATION: 96 % | RESPIRATION RATE: 18 BRPM | DIASTOLIC BLOOD PRESSURE: 86 MMHG | SYSTOLIC BLOOD PRESSURE: 146 MMHG | TEMPERATURE: 98.3 F

## 2019-05-13 DIAGNOSIS — Z12.11 COLON CANCER SCREENING: Primary | ICD-10-CM

## 2019-05-13 NOTE — PROGRESS NOTES
General Surgery History and Physical    Patient's Name/Date of Birth: Josh Merino / 1955    Date: May 13, 2019     Surgeon: Beba Dixon M.D.    PCP: Chauncey Ashby     Chief Complaint:     HPI:   Josh Merino is a 58 y.o. male who presents for screening colonoscopy, no symptoms, no family history. Past Medical History:   Diagnosis Date    Arthritis     Atrial fibrillation (HCC)     Back pain     CPAP (continuous positive airway pressure) dependence     setting 15    Difficulty swallowing     DJD (degenerative joint disease)     GERD (gastroesophageal reflux disease)     High cholesterol     Hx of cardiovascular stress test 04/12/2019    Lexiscan stress test    Knee pain     Morbid obesity due to excess calories (HCC)     Non-stress test nonreactive     2011    Problems with hearing     Sleep apnea     SOBOE (shortness of breath on exertion)        Past Surgical History:   Procedure Laterality Date    APPENDECTOMY      BACK SURGERY      laminectomy     CHOLECYSTECTOMY      COLONOSCOPY      KNEE SURGERY      both knees- scopes rt x2 left x 1    SHOULDER SURGERY      UPPER GASTROINTESTINAL ENDOSCOPY  03/08/2019    UPPER GASTROINTESTINAL ENDOSCOPY N/A 3/8/2019    EGD BIOPSY performed by Donavan Royal MD at [place] [address]       Current Outpatient Medications   Medication Sig Dispense Refill    aspirin 325 MG EC tablet Take 1 tablet by mouth daily 30 tablet 3    levothyroxine (SYNTHROID) 75 MCG tablet Take 75 mcg by mouth daily      dofetilide (TIKOSYN) 500 MCG capsule Take 500 mg by mouth 2 times daily      rivaroxaban (XARELTO) 20 MG TABS tablet Take 20 mg by mouth daily      carvedilol (COREG) 6.25 MG tablet Take 6.25 mg by mouth 2 times daily      ranitidine (RANITIDINE 150 MAX STRENGTH) 150 MG tablet Take 150 mg by mouth daily as needed for Heartburn       gabapentin (NEURONTIN) 100 MG capsule Take 100 mg by mouth nightly as needed (Pain).        Ascorbic Acid (VITAMIN C) 1000 MG tablet Take 1,000 mg by mouth daily       No current facility-administered medications for this visit.         Allergies   Allergen Reactions    Atorvastatin Calcium Other (See Comments)     myalgias    Lactose Diarrhea       Family History   Problem Relation Age of Onset    Dementia Mother     Heart Disease Father     No Known Problems Sister     No Known Problems Brother     Cancer Maternal Grandmother     Diabetes Maternal Grandfather     Heart Attack Paternal Grandmother     Heart Attack Paternal Grandfather        Social History     Socioeconomic History    Marital status: Life Partner     Spouse name: Not on file    Number of children: Not on file    Years of education: Not on file    Highest education level: Not on file   Occupational History    Not on file   Social Needs    Financial resource strain: Not on file    Food insecurity:     Worry: Not on file     Inability: Not on file    Transportation needs:     Medical: Not on file     Non-medical: Not on file   Tobacco Use    Smoking status: Never Smoker    Smokeless tobacco: Never Used   Substance and Sexual Activity    Alcohol use: No    Drug use: No    Sexual activity: Yes   Lifestyle    Physical activity:     Days per week: Not on file     Minutes per session: Not on file    Stress: Not on file   Relationships    Social connections:     Talks on phone: Not on file     Gets together: Not on file     Attends Alevism service: Not on file     Active member of club or organization: Not on file     Attends meetings of clubs or organizations: Not on file     Relationship status: Not on file    Intimate partner violence:     Fear of current or ex partner: Not on file     Emotionally abused: Not on file     Physically abused: Not on file     Forced sexual activity: Not on file   Other Topics Concern    Not on file   Social History Narrative    Not on file           Review of Systems  Negative times ten except what was stated in HPI and PMH    Physical exam:   BP (!) 146/86 (Site: Left Upper Arm, Position: Sitting, Cuff Size: Large Adult)   Pulse 79   Temp 98.3 °F (36.8 °C) (Oral)   Resp 18   Ht 6' 3\" (1.905 m)   Wt (!) 340 lb (154.2 kg)   SpO2 96%   BMI 42.50 kg/m²   General appearance: AAOx3, NAD  Pyscho/social: negative for tremors and hallucinations  Head: NCAT, PERRLA, EOMI, red conjunctiva  Neck: supple, no masses  Lungs: CTAB, equal chest rise bilateral  Heart: Reg rate  Abdomen: soft, nontender, nondistended  Skin; no lesions  Gu: no cva tenderness  Extremities: extremities normal, atraumatic, no cyanosis or edema      Assessment:  58 y.o. male with age risk colon cancer screening    Plan: For colonoscopy  Discussed the risk, benefits and alternatives including  bleeding, bowel perforation and the risks of  anesthetic including MI, CVA, sudden death. The patient understands the risks and alternatives. All questions were answered to the patient's satisfaction and they freely signed the consent.       Denice Soria MD  1:47 PM  5/13/2019

## 2019-05-20 ENCOUNTER — TELEPHONE (OUTPATIENT)
Dept: BARIATRICS/WEIGHT MGMT | Age: 64
End: 2019-05-20

## 2019-05-22 ENCOUNTER — TELEPHONE (OUTPATIENT)
Dept: BARIATRICS/WEIGHT MGMT | Age: 64
End: 2019-05-22

## 2019-05-22 ENCOUNTER — PREP FOR PROCEDURE (OUTPATIENT)
Dept: SURGERY | Age: 64
End: 2019-05-22

## 2019-05-22 RX ORDER — 0.9 % SODIUM CHLORIDE 0.9 %
10 VIAL (ML) INJECTION PRN
Status: CANCELLED | OUTPATIENT
Start: 2019-05-22

## 2019-05-22 RX ORDER — SODIUM CHLORIDE, SODIUM LACTATE, POTASSIUM CHLORIDE, CALCIUM CHLORIDE 600; 310; 30; 20 MG/100ML; MG/100ML; MG/100ML; MG/100ML
INJECTION, SOLUTION INTRAVENOUS CONTINUOUS
Status: CANCELLED | OUTPATIENT
Start: 2019-05-22

## 2019-05-22 RX ORDER — SODIUM CHLORIDE 0.9 % (FLUSH) 0.9 %
10 SYRINGE (ML) INJECTION EVERY 12 HOURS SCHEDULED
Status: CANCELLED | OUTPATIENT
Start: 2019-05-22

## 2019-05-28 ENCOUNTER — TELEPHONE (OUTPATIENT)
Dept: BARIATRICS/WEIGHT MGMT | Age: 64
End: 2019-05-28

## 2019-05-28 NOTE — TELEPHONE ENCOUNTER
Case prepared and submitted by fax to [facility] with request for LRYGB. Fax confirmation received and patient notified of insurance submission. Tentative surgery date 7-1-19 discussed with patient. Will need to obtain inpatient auth from [facility] once scheduled.

## 2019-05-30 NOTE — PROGRESS NOTES
[address]        Date: 5/30/2019    Date of surgery:  5/31/2019   Arrival Time: Hospital will call you between 5pm and 7pm with your final arrival time for surgery    1. Do not eat or drink anything after  Midnight  prior to surgery. This includes no water, chewing gum, mints or ice chips. 2. Take the following medications with a small sip of water on the morning of Surgery:  May take tykosyn carvidilol and thyroid dos with sip water     3. Diabetics may take evening dose of insulin but none after midnight. If you feel symptomatic or low blood sugar morning of surgery drink 1-2 ounces of apple juice only. 4. Aspirin, Ibuprofen, Advil, Naproxen, Vitamin E and other Anti-inflammatory products should be stopped  before surgery  as directed by your physician. Take Tylenol only unless instructed otherwise by your surgeon. 5. Check with your Doctor regarding stopping Plavix, Coumadin, Lovenox, Eliquis, Effient, or other blood thinners. 6. Do not smoke,use illicit drugs and do not drink any alcoholic beverages 24 hours prior to surgery. 7. You may brush your teeth the morning of surgery. DO NOT SWALLOW WATER    8. You MUST make arrangements for a responsible adult to take you home after your surgery. You will not be allowed to leave alone or drive yourself home. It is strongly suggested someone stay with you the first 24 hrs. Your surgery will be cancelled if you do not have a ride home. 9. PEDIATRIC PATIENTS ONLY:  A parent/legal guardian must accompany a child scheduled for surgery and plan to stay at the hospital until the child is discharged. Please do not bring other children with you.     10. Please wear simple, loose fitting clothing to the hospital.  Do not bring valuables (money, credit cards, checkbooks, etc.) Do not wear any makeup (including no eye makeup) or nail polish on your fingers or toes. 11. DO NOT wear any jewelry or piercings on day of surgery. All body piercing jewelry must be removed. 12. Shower the night before surgery with _x__Antibacterial soap /DEJUAN WIPES________    13. TOTAL JOINT REPLACEMENT/HYSTERECTOMY PATIENTS ONLY---Remember to bring Blood Bank bracelet to the hospital on the day of surgery. 14. If you have a Living Will and Durable Power of  for Healthcare, please bring in a copy. 15. If appropriate bring crutches, inspirex, WALKER, CANE etc... 12. Notify your Surgeon if you develop any illness between now and surgery time, cough, cold, fever, sore throat, nausea, vomiting, etc.  Please notify your surgeon if you experience dizziness, shortness of breath or blurred vision between now & the time of your surgery. 17. If you have ___dentures, they will be removed before going to the OR; we will provide you a container. If you wear ___contact lenses or ___glasses, they will be removed; please bring a case for them. 18. To provide excellent care visitors will be limited to 2 in the room at any given time. 19. Please bring picture ID and insurance card. 20. Sleep apnea patients need to bring CPAP AND SETTINGS to hospital on day of surgery. 21. During flu season no children under the age of 15 are permitted in the hospital for the safety of all patients. 22. Other                  Please call AMBULATORY CARE if you have any further questions.    [facility]     [address]

## 2019-05-31 ENCOUNTER — TELEPHONE (OUTPATIENT)
Dept: SURGERY | Age: 64
End: 2019-05-31

## 2019-05-31 ENCOUNTER — HOSPITAL ENCOUNTER (OUTPATIENT)
Age: 64
Setting detail: OUTPATIENT SURGERY
Discharge: HOME | End: 2019-05-31
Attending: SURGERY | Admitting: SURGERY
Payer: MEDICARE

## 2019-05-31 ENCOUNTER — ANESTHESIA (OUTPATIENT)
Dept: ENDOSCOPY | Age: 64
End: 2019-05-31
Payer: MEDICARE

## 2019-05-31 ENCOUNTER — ANESTHESIA EVENT (OUTPATIENT)
Dept: ENDOSCOPY | Age: 64
End: 2019-05-31
Payer: MEDICARE

## 2019-05-31 VITALS
BODY MASS INDEX: 39.17 KG/M2 | OXYGEN SATURATION: 98 % | SYSTOLIC BLOOD PRESSURE: 152 MMHG | HEART RATE: 62 BPM | HEIGHT: 75 IN | TEMPERATURE: 97.8 F | DIASTOLIC BLOOD PRESSURE: 83 MMHG | RESPIRATION RATE: 20 BRPM | WEIGHT: 315 LBS

## 2019-05-31 VITALS
SYSTOLIC BLOOD PRESSURE: 158 MMHG | RESPIRATION RATE: 21 BRPM | OXYGEN SATURATION: 98 % | DIASTOLIC BLOOD PRESSURE: 91 MMHG

## 2019-05-31 DIAGNOSIS — Z53.9 PROCEDURE NOT CARRIED OUT: Primary | ICD-10-CM

## 2019-05-31 PROCEDURE — 3700000001 HC ADD 15 MINUTES (ANESTHESIA): Performed by: SURGERY

## 2019-05-31 PROCEDURE — 7100000010 HC PHASE II RECOVERY - FIRST 15 MIN: Performed by: SURGERY

## 2019-05-31 PROCEDURE — 6360000002 HC RX W HCPCS: Performed by: NURSE ANESTHETIST, CERTIFIED REGISTERED

## 2019-05-31 PROCEDURE — 99024 POSTOP FOLLOW-UP VISIT: CPT | Performed by: SURGERY

## 2019-05-31 PROCEDURE — 2580000003 HC RX 258: Performed by: SURGERY

## 2019-05-31 PROCEDURE — 3609027000 HC COLONOSCOPY: Performed by: SURGERY

## 2019-05-31 PROCEDURE — 3700000000 HC ANESTHESIA ATTENDED CARE: Performed by: SURGERY

## 2019-05-31 PROCEDURE — 2709999900 HC NON-CHARGEABLE SUPPLY: Performed by: SURGERY

## 2019-05-31 PROCEDURE — 7100000011 HC PHASE II RECOVERY - ADDTL 15 MIN: Performed by: SURGERY

## 2019-05-31 PROCEDURE — G0121 COLON CA SCRN NOT HI RSK IND: HCPCS | Performed by: SURGERY

## 2019-05-31 RX ORDER — SODIUM CHLORIDE, SODIUM LACTATE, POTASSIUM CHLORIDE, CALCIUM CHLORIDE 600; 310; 30; 20 MG/100ML; MG/100ML; MG/100ML; MG/100ML
INJECTION, SOLUTION INTRAVENOUS CONTINUOUS
Status: DISCONTINUED | OUTPATIENT
Start: 2019-05-31 | End: 2019-05-31 | Stop reason: HOSPADM

## 2019-05-31 RX ORDER — SODIUM CHLORIDE 0.9 % (FLUSH) 0.9 %
10 SYRINGE (ML) INJECTION EVERY 12 HOURS SCHEDULED
Status: DISCONTINUED | OUTPATIENT
Start: 2019-05-31 | End: 2019-05-31 | Stop reason: HOSPADM

## 2019-05-31 RX ORDER — PROPOFOL 10 MG/ML
INJECTION, EMULSION INTRAVENOUS PRN
Status: DISCONTINUED | OUTPATIENT
Start: 2019-05-31 | End: 2019-05-31 | Stop reason: SDUPTHER

## 2019-05-31 RX ORDER — PROPOFOL 10 MG/ML
INJECTION, EMULSION INTRAVENOUS CONTINUOUS PRN
Status: DISCONTINUED | OUTPATIENT
Start: 2019-05-31 | End: 2019-05-31 | Stop reason: SDUPTHER

## 2019-05-31 RX ORDER — 0.9 % SODIUM CHLORIDE 0.9 %
10 VIAL (ML) INJECTION PRN
Status: DISCONTINUED | OUTPATIENT
Start: 2019-05-31 | End: 2019-05-31 | Stop reason: HOSPADM

## 2019-05-31 RX ADMIN — PROPOFOL 150 MCG/KG/MIN: 10 INJECTION, EMULSION INTRAVENOUS at 09:25

## 2019-05-31 RX ADMIN — SODIUM CHLORIDE, POTASSIUM CHLORIDE, SODIUM LACTATE AND CALCIUM CHLORIDE: 600; 310; 30; 20 INJECTION, SOLUTION INTRAVENOUS at 09:16

## 2019-05-31 RX ADMIN — PROPOFOL 80 MG: 10 INJECTION, EMULSION INTRAVENOUS at 09:25

## 2019-05-31 RX ADMIN — PROPOFOL 60 MG: 10 INJECTION, EMULSION INTRAVENOUS at 09:26

## 2019-05-31 RX ADMIN — SODIUM CHLORIDE, POTASSIUM CHLORIDE, SODIUM LACTATE AND CALCIUM CHLORIDE: 600; 310; 30; 20 INJECTION, SOLUTION INTRAVENOUS at 08:46

## 2019-05-31 ASSESSMENT — ENCOUNTER SYMPTOMS: SHORTNESS OF BREATH: 1

## 2019-05-31 ASSESSMENT — PAIN SCALES - GENERAL
PAINLEVEL_OUTOF10: 0
PAINLEVEL_OUTOF10: 0

## 2019-05-31 NOTE — H&P
Take 150 mg by mouth daily as needed for Heartburn         gabapentin (NEURONTIN) 100 MG capsule Take 100 mg by mouth nightly as needed (Pain).          Ascorbic Acid (VITAMIN C) 1000 MG tablet Take 1,000 mg by mouth daily          No current facility-administered medications for this visit.                   Allergies   Allergen Reactions    Atorvastatin Calcium Other (See Comments)       myalgias    Lactose Diarrhea         Family History         Family History   Problem Relation Age of Onset    Dementia Mother      Heart Disease Father      No Known Problems Sister      No Known Problems Brother      Cancer Maternal Grandmother      Diabetes Maternal Grandfather      Heart Attack Paternal Grandmother      Heart Attack Paternal Grandfather              Social History               Socioeconomic History    Marital status: Life Partner       Spouse name: Not on file    Number of children: Not on file    Years of education: Not on file    Highest education level: Not on file   Occupational History    Not on file   Social Needs    Financial resource strain: Not on file    Food insecurity:       Worry: Not on file       Inability: Not on file    Transportation needs:       Medical: Not on file       Non-medical: Not on file   Tobacco Use    Smoking status: Never Smoker    Smokeless tobacco: Never Used   Substance and Sexual Activity    Alcohol use: No    Drug use: No    Sexual activity: Yes   Lifestyle    Physical activity:       Days per week: Not on file       Minutes per session: Not on file    Stress: Not on file   Relationships    Social connections:       Talks on phone: Not on file       Gets together: Not on file       Attends Synagogue service: Not on file       Active member of club or organization: Not on file       Attends meetings of clubs or organizations: Not on file       Relationship status: Not on file    Intimate partner violence:       Fear of current or ex partner: Not

## 2019-05-31 NOTE — OP NOTE
DATE OF PROCEDURE: 5/31/2019    SURGEON: Beba Dixon M.D.     ASSISTANT: none M.D. PREOPERATIVE DIAGNOSES:  screening    POSTOPERATIVE DIAGNOSES: normal to ascending      OPERATION: Colonoscopy to the ascending colon    ANESTHESIA: Conscious Sedation    COMPLICATIONS: None. OPERATIONS: The patient was placed on the table and sedated. Digital rectal exam showed normal tone, no masses. A lubricated scope was passed into the rectum which looked normal.  The scope was passed all the way around to the ascending colon but was unable to be advanced past this point. The scope was then slowly withdrawn, each area was examined again on the way out. There was no pathology seen. The scope was retroflexed in the rectum showing normal plexus The patient tolerated the procedure well. He will need repeat colonoscopy vs barium enema.       Physician Signature: Electronically signed by Dr. Marcus Doty

## 2019-05-31 NOTE — PROGRESS NOTES
[id number]  [facility] endo nurse aware pt. Has jeanie. Hearing aids in both ears and  Upper cap tooth became loose next day after procedure [place] 5/31/2019. Arsalan Forrester

## 2019-05-31 NOTE — ANESTHESIA POSTPROCEDURE EVALUATION
Department of Anesthesiology  Postprocedure Note    Patient: Rosemary Jose  MRN: [id number]  YOB: 1955  Date of evaluation: 5/31/2019  Time:  10:04 AM     Procedure Summary     Date:  05/31/19 Room / Location:  [address] [place] [address] ENDO 01 / [address] [place] [address] ENDOSCOPY    Anesthesia Start:  0916 Anesthesia Stop:  Sebastian Dopp    Procedure:  COLONOSCOPY (N/A ) Diagnosis:  (SCREENING)    Surgeon:  Jocelin Alvarado MD Responsible Provider:  Paige Jennings MD    Anesthesia Type:  MAC ASA Status:  3          Anesthesia Type: MAC    Brigette Phase I: Brigette Score: 10    Brigette Phase II:      Last vitals: Reviewed and per EMR flowsheets.        Anesthesia Post Evaluation    Patient location during evaluation: PACU  Patient participation: complete - patient participated  Level of consciousness: awake  Pain score: 0  Airway patency: patent  Nausea & Vomiting: no nausea  Complications: no  Cardiovascular status: blood pressure returned to baseline  Respiratory status: acceptable  Hydration status: euvolemic

## 2019-05-31 NOTE — TELEPHONE ENCOUNTER
Per Dr. Clem Dale, patient is have a Barium Enema due to an incomplete colonoscopy. Orders in [address] [place] [address] Rd. Mag Citrate prep. Call placed to patient and message left for him to call back and let us know when he would like that scheduled.     Electronically signed by Shama Crowder MA on 5/31/2019 at 1:00 PM

## 2019-05-31 NOTE — TELEPHONE ENCOUNTER
Per the order of Dr. Marcus Doty, patient has been scheduled for barium enema at [facility] on 6.7.19 at 7:30 am.      Call placed to patient to discuss testing. Patient informed of testing and prep. Patient will need to purchase a bottle of magnesium citrate over the counter. Patient instructed to consume bottle of magnesium citrate between 4:00 pm and 6:00 pm the day before his testing. Patient instructed to remain on clear liquids until midnight and then nothing by mouth until after testing is complete. Patient verbalized understanding and is scheduled to review results with Dr. Marcus Doty.

## 2019-05-31 NOTE — ANESTHESIA PRE PROCEDURE
pressure) dependence     setting 15    Difficulty swallowing     DJD (degenerative joint disease)     GERD (gastroesophageal reflux disease)     High cholesterol     Hx of cardiovascular stress test 04/12/2019    Lexiscan stress test    Knee pain     Morbid obesity due to excess calories (HCC)     Non-stress test nonreactive     2011    PONV (postoperative nausea and vomiting)     Problems with hearing     Sleep apnea     SOBOE (shortness of breath on exertion)     Thyroid disease        Past Surgical History:        Procedure Laterality Date    APPENDECTOMY      BACK SURGERY      laminectomy     CARDIOVERSION      2 years ago dr bolanos [facility]    CHOLECYSTECTOMY      COLONOSCOPY      KNEE SURGERY      both knees- scopes rt x2 left x 1    SHOULDER SURGERY      UPPER GASTROINTESTINAL ENDOSCOPY  03/08/2019    UPPER GASTROINTESTINAL ENDOSCOPY N/A 3/8/2019    EGD BIOPSY performed by Cassie Cadena MD at [address] History:    Social History     Tobacco Use    Smoking status: Never Smoker    Smokeless tobacco: Never Used   Substance Use Topics    Alcohol use: No                                Counseling given: Not Answered      Vital Signs (Current):   Vitals:    05/30/19 0926   Weight: (!) 340 lb (154.2 kg)   Height: 6' 3\" (1.905 m)                                              BP Readings from Last 3 Encounters:   05/13/19 (!) 146/86   04/12/19 (!) 142/78   03/20/19 129/86       NPO Status: Time of last liquid consumption: 0700                        Time of last solid consumption: 1900                        Date of last liquid consumption: 05/31/19                        Date of last solid food consumption: 05/29/19    BMI:   Wt Readings from Last 3 Encounters:   05/30/19 (!) 340 lb (154.2 kg)   05/13/19 (!) 340 lb (154.2 kg)   05/08/19 (!) 338 lb (153.3 kg)     Body mass index is 42.5 kg/m².     CBC:   Lab Results   Component Value Date    WBC 5.2 04/12/2019    RBC 4.57 04/12/2019    HGB 13.4 04/12/2019    HCT 40.3 04/12/2019    MCV 88.2 04/12/2019    RDW 13.8 04/12/2019     04/12/2019       CMP:   Lab Results   Component Value Date     04/12/2019    K 3.7 04/12/2019    K 3.7 04/11/2019     04/12/2019    CO2 24 04/12/2019    BUN 11 04/12/2019    CREATININE 0.8 04/12/2019    GFRAA >60 04/12/2019    LABGLOM >60 04/12/2019    GLUCOSE 121 04/12/2019    GLUCOSE 95 02/04/2011    PROT 7.3 04/12/2019    CALCIUM 8.8 04/12/2019    BILITOT 0.4 04/12/2019    ALKPHOS 101 04/12/2019    AST 18 04/12/2019    ALT 13 04/12/2019       POC Tests: No results for input(s): POCGLU, POCNA, POCK, POCCL, POCBUN, POCHEMO, POCHCT in the last 72 hours. Coags: No results found for: PROTIME, INR, APTT    HCG (If Applicable): No results found for: PREGTESTUR, PREGSERUM, HCG, HCGQUANT     ABGs: No results found for: PHART, PO2ART, NUN1PDY, BKJ4CUG, BEART, X2PKQMZA     Type & Screen (If Applicable):  No results found for: LABABO, [address]    Anesthesia Evaluation  Patient summary reviewed   history of anesthetic complications: PONV. Airway: Mallampati: II  TM distance: >3 FB   Neck ROM: full  Mouth opening: > = 3 FB Dental:    (+) caps      Pulmonary: breath sounds clear to auscultation  (+) shortness of breath:  sleep apnea: on CPAP,                             Cardiovascular:    (+) dysrhythmias: atrial fibrillation, NICKERSON:, hyperlipidemia      ECG reviewed  Rhythm: regular  Rate: normal           Beta Blocker:  Dose within 24 Hrs      ROS comment: EKG: Bradycardia 52, First Degree Heart Block, Left axis deviation. Neuro/Psych:                ROS comment: Knee pain. Chronic Back Problems   GI/Hepatic/Renal:   (+) GERD:,           Endo/Other:    (+) hypothyroidism: arthritis (DJD.):., .                  ROS comment: Hearing Loss.   M Obesity   Abdominal:   (+) obese,         Vascular:                                      Anesthesia Plan      MAC     ASA 3       Induction: intravenous. Anesthetic plan and risks discussed with patient. Plan discussed with CRNA.     Attending anesthesiologist reviewed and agrees with Yecenia Chapman MD   5/31/2019

## 2019-06-03 ENCOUNTER — TELEPHONE (OUTPATIENT)
Dept: BARIATRICS/WEIGHT MGMT | Age: 64
End: 2019-06-03

## 2019-06-03 NOTE — TELEPHONE ENCOUNTER
Per order of Dr. Chanda Mancera patient is ready to be scheduled for LRYGB. Call placed to patient and he wants his surgery to be done on 06/24/2019. OK per Dr. Chanda Mancera. Patient placed on [employer] and surgery schedulers notified. Will give pre op letter and copy of the low fat diet to patient. He is on xarelto will discuss at visit.

## 2019-06-03 NOTE — TELEPHONE ENCOUNTER
Call received from [facility] to approve patient's surgery for DOS 7-1-19. Auth number is [id number], valid 6-3-19 to 8-3-19. Letter will be sent.

## 2019-06-04 ENCOUNTER — PREP FOR PROCEDURE (OUTPATIENT)
Dept: SURGERY | Age: 64
End: 2019-06-04

## 2019-06-04 RX ORDER — SCOLOPAMINE TRANSDERMAL SYSTEM 1 MG/1
1 PATCH, EXTENDED RELEASE TRANSDERMAL
Status: CANCELLED | OUTPATIENT
Start: 2019-06-04 | End: 2019-06-07

## 2019-06-04 RX ORDER — SODIUM CHLORIDE, SODIUM LACTATE, POTASSIUM CHLORIDE, CALCIUM CHLORIDE 600; 310; 30; 20 MG/100ML; MG/100ML; MG/100ML; MG/100ML
INJECTION, SOLUTION INTRAVENOUS CONTINUOUS
Status: CANCELLED | OUTPATIENT
Start: 2019-06-04

## 2019-06-04 RX ORDER — ONDANSETRON 2 MG/ML
4 INJECTION INTRAMUSCULAR; INTRAVENOUS ONCE
Status: CANCELLED | OUTPATIENT
Start: 2019-06-04 | End: 2019-06-04

## 2019-06-04 RX ORDER — SODIUM CHLORIDE 0.9 % (FLUSH) 0.9 %
10 SYRINGE (ML) INJECTION EVERY 12 HOURS SCHEDULED
Status: CANCELLED | OUTPATIENT
Start: 2019-06-04

## 2019-06-04 RX ORDER — SODIUM CHLORIDE 0.9 % (FLUSH) 0.9 %
10 SYRINGE (ML) INJECTION PRN
Status: CANCELLED | OUTPATIENT
Start: 2019-06-04

## 2019-06-05 ENCOUNTER — OFFICE VISIT (OUTPATIENT)
Dept: BARIATRICS/WEIGHT MGMT | Age: 64
End: 2019-06-05
Payer: MEDICARE

## 2019-06-05 ENCOUNTER — INITIAL CONSULT (OUTPATIENT)
Dept: BARIATRICS/WEIGHT MGMT | Age: 64
End: 2019-06-05
Payer: MEDICARE

## 2019-06-05 VITALS — WEIGHT: 315 LBS | BODY MASS INDEX: 39.17 KG/M2 | HEIGHT: 75 IN

## 2019-06-05 VITALS
RESPIRATION RATE: 18 BRPM | DIASTOLIC BLOOD PRESSURE: 95 MMHG | HEART RATE: 59 BPM | BODY MASS INDEX: 39.17 KG/M2 | WEIGHT: 315 LBS | SYSTOLIC BLOOD PRESSURE: 158 MMHG | TEMPERATURE: 97.7 F | HEIGHT: 75 IN

## 2019-06-05 DIAGNOSIS — R10.13 EPIGASTRIC ABDOMINAL PAIN: Primary | ICD-10-CM

## 2019-06-05 DIAGNOSIS — Z71.3 DIETARY COUNSELING: Primary | ICD-10-CM

## 2019-06-05 PROCEDURE — 99999 PR OFFICE/OUTPT VISIT,PROCEDURE ONLY: CPT

## 2019-06-05 PROCEDURE — 99214 OFFICE O/P EST MOD 30 MIN: CPT | Performed by: SURGERY

## 2019-06-05 PROCEDURE — 99213 OFFICE O/P EST LOW 20 MIN: CPT

## 2019-06-05 RX ORDER — OMEPRAZOLE 20 MG/1
20 CAPSULE, DELAYED RELEASE ORAL DAILY
Qty: 30 CAPSULE | Refills: 12 | Status: ON HOLD
Start: 2019-06-05 | End: 2021-04-19 | Stop reason: ALTCHOICE

## 2019-06-05 RX ORDER — ONDANSETRON 4 MG/1
4 TABLET, ORALLY DISINTEGRATING ORAL EVERY 8 HOURS PRN
Qty: 10 TABLET | Refills: 0 | Status: SHIPPED | OUTPATIENT
Start: 2019-06-05 | End: 2019-10-09 | Stop reason: ALTCHOICE

## 2019-06-05 RX ORDER — HYDROCODONE BITARTRATE AND ACETAMINOPHEN 5; 325 MG/1; MG/1
1 TABLET ORAL EVERY 6 HOURS PRN
Qty: 10 TABLET | Refills: 0 | Status: SHIPPED | OUTPATIENT
Start: 2019-06-05 | End: 2019-06-08

## 2019-06-05 NOTE — PROGRESS NOTES
Wyatt Reid  6/5/2019  [facility]               History and Physical  Gastric Bypass         CHIEF COMPLAINT: Morbid obesity, malnutrition, Hypothyroidism, GERD and Degenerative Joint Disease (DJD)        HISTORY OF PRESENT ILLNESS: Jose Dillon is a morbidly-obese 58 y.o.  male, who weighs (!) 335 lb (152 kg). He is 135 pounds over his ideal body weight. The Body mass index is 41.87 kg/m². He has lost 21 pounds over the past several months in preparation for surgery. He has multiple medical problems aggravated by his obesity. He wishes to have a gastric bypass so that he can lose more weight and keep the weight off. I have met with him on 2 different occasions in the [facility], where we discussed the surgery in great detail and went over the risks and benefits. He has watched our informational video so he understands all of the extensive risks involved. He states that he understands all of these risks and wishes to proceed.     Past Medical History  Patient Active Problem List   Diagnosis    Gastroesophageal reflux disease without esophagitis    Chest pain    Colon cancer screening     Past Surgical History:   Procedure Laterality Date    APPENDECTOMY      BACK SURGERY      laminectomy     CARDIOVERSION      2 years ago dr [facility]    CHOLECYSTECTOMY      COLONOSCOPY      COLONOSCOPY  05/31/2019    COLONOSCOPY N/A 5/31/2019    COLONOSCOPY performed by Barrera Jeong MD at [address] [place] [address]      both knees- scopes rt x2 left x 1    SHOULDER SURGERY      UPPER GASTROINTESTINAL ENDOSCOPY  03/08/2019    UPPER GASTROINTESTINAL ENDOSCOPY N/A 3/8/2019    EGD BIOPSY performed by Barrera Jeong MD at [facility] ENDOSCOPY      Allergies: Atorvastatin calcium and Lactose     Medications:  Current Outpatient Medications   Medication Sig Dispense Refill    HYDROcodone-acetaminophen (NORCO) 5-325 MG per tablet Take 1 tablet by mouth every 6 hours as needed for Pain for up to 3 days. 10 tablet 0    omeprazole (PRILOSEC) 20 MG delayed release capsule Take 1 capsule by mouth Daily 30 capsule 12    ondansetron (ZOFRAN ODT) 4 MG disintegrating tablet Take 1 tablet by mouth every 8 hours as needed for Nausea or Vomiting 10 tablet 0    Multiple Vitamin (MULTI VITAMIN MENS PO) Take 1 tablet by mouth daily      levothyroxine (SYNTHROID) 75 MCG tablet Take 75 mcg by mouth daily      dofetilide (TIKOSYN) 500 MCG capsule Take 500 mg by mouth 2 times daily      rivaroxaban (XARELTO) 20 MG TABS tablet Take 20 mg by mouth daily Last dose 5/29/2019      carvedilol (COREG) 6.25 MG tablet Take 6.25 mg by mouth 2 times daily      ranitidine (RANITIDINE 150 MAX STRENGTH) 150 MG tablet Take 150 mg by mouth daily as needed for Heartburn       Ascorbic Acid (VITAMIN C) 1000 MG tablet Take 1,000 mg by mouth daily       No current facility-administered medications for this visit.          Social History     Tobacco Use    Smoking status: Never Smoker    Smokeless tobacco: Never Used   Substance Use Topics    Alcohol use: No      Review of Systems    Review of Systems - General ROS: negative for - chills, fatigue or malaise  ENT ROS: negative for - hearing change, nasal congestion or nasal discharge  Allergy and Immunology ROS: negative for - hives, itchy/watery eyes or nasal congestion  Hematological and Lymphatic ROS: negative for - blood clots, blood transfusions, bruising or fatigue  Endocrine ROS: negative for - malaise/lethargy, mood swings, palpitations or polydipsia/polyuria  Breast ROS: negative for - new or changing breast lumps or nipple changes  Respiratory ROS: negative for - sputum changes, stridor, tachypnea or wheezing  Cardiovascular ROS: negative for - irregular heartbeat, loss of consciousness, murmur or orthopnea  Gastrointestinal ROS: negative for - constipation, diarrhea, gas/bloating, heartburn or hematemesis  Genito-Urinary ROS: negative for - signed by Dr. Norma Kelley MD    Send copy of H&P to PCP, Kelton Palomo

## 2019-06-05 NOTE — PATIENT INSTRUCTIONS
[facility]  Dietary Follow-up Appointment Instructions    Maurice Mathias   Date: 6/5/2019     The RD / LD reviewed the following instructions with the patient and handouts have been given. Pt. is able to verbalize instruction and has been instructed to call with any problems or complications. If patient is not able to comply with the dietary or supplement instructions given pt. is instructed to call the [facility] at [phone]. Patient has been instructed to continue to follow a low-fat diet from today's date until the day before surgery. Patient has been instructed to drink 64 oz. of water daily eliminating carbonated and caffeinated beverages. The day before surgery patient will need to follow the bowel prep instructions which consist of a bariatric clear liquid diet, 64 oz of water, 64 oz of the bowel prep and nothing to drink after midnight. ( See Handout in Booklet)    Bariatric Clear Liquids Allowed:  Diet Jell-O ( No Red or Orange Coloring or Flavoring  Crystal Light, Fruit 2 O (No Red or [employer] or Flavoring)  Water  Broth - Chicken, Beef or Vegetable  Decaffeinated Tea - Plain  Decaffeinated Coffee - Plain  Sugar Free Popsicles (No Red or Orange Coloring or Flavoring)    ________________________________________________________________________  10% Of Excess Body Weight Requirement:    Weight loss goal met  ________________________________________________________________________  3 Month Supply of Supplements:    Pt purchased supplements 6/5/19 at [facility] .

## 2019-06-05 NOTE — PATIENT INSTRUCTIONS
If you have not done so already, please make sure you watch the preoperative DVD, \"Getting Ready for Surgery\" prior to your weight loss procedure. This DVD has important information that you need to know prior to your surgery. It also contains directions to promote good care following your procedure. Please follow the instruction sheets you received today for your pre-surgical skin and bowel prep. It is very important that your abdomen is properly cleaned and your bowel is cleansed of stool prior to surgery to decrease the chance of any complications. Make sure that you do not eat food or drink fluids after midnight prior to your surgery. If you eat or drink within 8 hours of your surgery, your procedure will be cancelled. Hold your medications as well unless you receive special instruction from either your surgeon or the anesthesiologist to take one of your medications with a small sip of water. Please be advised that most patients are now released from the hospital the day after surgery, regardless of procedure (Band, Bypass, or Sleeve), if they are progressing well and feel ready for discharge. You will see your surgeon prior to your hospital release so that he can examine you and discuss your discharge needs. Please call the [facility] with any questions you may have [phone]. Bowel Prep Instructions    A bowel prep is necessary prior to all lower scopes and laparoscopic abdominal surgeries. What you will need:  1) One 8.3 oz bottle of MiraLAX (available over the counter)  2) 64 oz. of Gatorade or Crystal Light- do NOT use red or purple varieties  3) One bottle of Magnesium Citrate (available over-the-counter)    The bowel prep will begin 2 days before going to the hospital for your procedure. 2 Days Before:    Make sure your bowels move using the Magnesium citrate. Follow the instructions on the bottle. 1 Day Before:  You may have only clear liquids until beginning the MiraLAX at 1:30    Starting at 1:30 p.m., mix the MiraLAX (238 Gm.) in 64 oz. of Gatorade, Crystal Light or other clear liquid. Drink 8 oz. of the mixture every 10 minutes until finished. You may continue clear liquids until 8 hours before your procedure. A Clear Liquid Diet is Suggested to Be:    Black coffee, tea, decaffeinated tea, Gingerale, 7-Up, Addison-Aid (no purple or red)    Plain gelatin, popsicles (again no purple or red), apple juice    Clear broth, bouillon and consomme    Sugar, honey, polycose and salt can be added    Also liquid nutritional supplement such as Citrotein and Resource Diabetashield    If you should have any questions regarding this prep or your procedure, you can call  the office at . Skin Prep    Home Instruction for Preoperative Antibacterial Dial Soap    Reason for using this soap before surgery:    - To decrease the potential for wound infection after surgery    How to use:    - Obtain Antibacterial Dial soap, either in bar or liquid form from your local store. The soap must be the Antibacterial type of Dial.    - Unless you are allergic to this product or notice that it is causing skin irritation, wash with this soap from now until surgery. Make sure to shower with this soap the morning of your surgery before departing for the hospital.    - Use a clean wash cloth or new body sponge.    - Wash from the neck down, paying particular attention to the abdominal area and belly button. - Be gentle. DO NOT irritate or scrub the skin until red. - Rinse thoroughly and pat dry with a clean towel. - Dress with clean clothing.    - Do not apply lotions or powders the morning of surgery.

## 2019-06-05 NOTE — PROGRESS NOTES
[id number] [address] [facility]  Nutrition History and Physical     Carey Abner    Surgery Type: RYGB   Today's Date: 6/5/19    yes  Patient attended a 2 hour nutrition education class on 5/8/19, and was given written educational material.  Patient signed and verbalized understanding of nutrition therapy for Bariatric Surgery. Assessment:              Vitals:    06/05/19 1527   Weight: (!) 335 lb (152 kg)   Height: 6' 3\" (1.905 m)    Height: 6' 3\" (1.905 m) Weight: (!) 335 lb (152 kg)   IBW:200 lb %EBWL: 15 %     BMI:Body mass index is 41.87 kg/m². Food Allergies and Allergies: No    Food Intolerances: Yes Intolerance    Eating Problems:No  Chewing Problems:No  Swallowing Problems:No    Current Vitamins/Supplements and Medications:  Current Outpatient Medications:     HYDROcodone-acetaminophen (NORCO) 5-325 MG per tablet, Take 1 tablet by mouth every 6 hours as needed for Pain for up to 3 days. , Disp: 10 tablet, Rfl: 0    omeprazole (PRILOSEC) 20 MG delayed release capsule, Take 1 capsule by mouth Daily, Disp: 30 capsule, Rfl: 12    ondansetron (ZOFRAN ODT) 4 MG disintegrating tablet, Take 1 tablet by mouth every 8 hours as needed for Nausea or Vomiting, Disp: 10 tablet, Rfl: 0    Multiple Vitamin (MULTI VITAMIN MENS PO), Take 1 tablet by mouth daily, Disp: , Rfl:     levothyroxine (SYNTHROID) 75 MCG tablet, Take 75 mcg by mouth daily, Disp: , Rfl:     dofetilide (TIKOSYN) 500 MCG capsule, Take 500 mg by mouth 2 times daily, Disp: , Rfl:     rivaroxaban (XARELTO) 20 MG TABS tablet, Take 20 mg by mouth daily Last dose 5/29/2019, Disp: , Rfl:     carvedilol (COREG) 6.25 MG tablet, Take 6.25 mg by mouth 2 times daily, Disp: , Rfl:     ranitidine (RANITIDINE 150 MAX STRENGTH) 150 MG tablet, Take 150 mg by mouth daily as needed for Heartburn , Disp: , Rfl:     Ascorbic Acid (VITAMIN C) 1000 MG tablet, Take 1,000 mg by mouth daily, Disp: , Rfl:   _    Please check all that apply: Yes - Patient lost 10% of excess body weight prior to surgery  Yes - Patient  is able to verbalize a Bariatric Full Liquid Diet. Yes - Patient is able to verbalize the usage & importance of Protein Supplements. Yes- Patient purchased 3 month supply of protein vitamins and calcium. YES - Patient is instructed to follow a low-fat diet from now until surgery date. YES - Patient is instructed to take 30 grams of a protein supplement from  now until surgery date in addition to low-fat diet guidelines. YES - Patient is instructed to consume 64 ounces of water daily from now until surgery date.  ________________________________________________________________________  Yes - Patient did lose 10% of excess body weight prior to surgery  ________________________________________________________________________  Yes - Patient did purchase 3 month supply of protein, vitamins and  Calcium.       Comments: [facility] Supplements

## 2019-06-07 ENCOUNTER — HOSPITAL ENCOUNTER (OUTPATIENT)
Dept: GENERAL RADIOLOGY | Age: 64
Discharge: HOME | End: 2019-06-09
Payer: MEDICARE

## 2019-06-07 DIAGNOSIS — Z53.9 PROCEDURE NOT CARRIED OUT: ICD-10-CM

## 2019-06-07 PROCEDURE — 2500000003 HC RX 250 WO HCPCS: Performed by: SURGERY

## 2019-06-07 PROCEDURE — 74270 X-RAY XM COLON 1CNTRST STD: CPT

## 2019-06-07 RX ADMIN — BARIUM SULFATE: 965 POWDER, FOR SUSPENSION RECTAL at 09:08

## 2019-06-17 ENCOUNTER — HOSPITAL ENCOUNTER (OUTPATIENT)
Dept: PREADMISSION TESTING | Age: 64
Discharge: HOME | End: 2019-06-17
Payer: MEDICARE

## 2019-06-17 VITALS
WEIGHT: 315 LBS | TEMPERATURE: 97.6 F | SYSTOLIC BLOOD PRESSURE: 148 MMHG | HEIGHT: 75 IN | RESPIRATION RATE: 18 BRPM | OXYGEN SATURATION: 95 % | BODY MASS INDEX: 39.17 KG/M2 | DIASTOLIC BLOOD PRESSURE: 97 MMHG | HEART RATE: 75 BPM

## 2019-06-17 DIAGNOSIS — Z01.818 PRE-OP TESTING: Primary | ICD-10-CM

## 2019-06-17 DIAGNOSIS — R10.13 EPIGASTRIC ABDOMINAL PAIN: ICD-10-CM

## 2019-06-17 LAB
ALBUMIN SERPL-MCNC: 4.2 G/DL (ref 3.5–5.2)
ALP BLD-CCNC: 104 U/L (ref 40–129)
ALT SERPL-CCNC: 21 U/L (ref 0–40)
ANION GAP SERPL CALCULATED.3IONS-SCNC: 12 MMOL/L (ref 7–16)
AST SERPL-CCNC: 21 U/L (ref 0–39)
BILIRUB SERPL-MCNC: 0.4 MG/DL (ref 0–1.2)
BUN BLDV-MCNC: 15 MG/DL (ref 8–23)
CALCIUM SERPL-MCNC: 9.1 MG/DL (ref 8.6–10.2)
CHLORIDE BLD-SCNC: 105 MMOL/L (ref 98–107)
CO2: 23 MMOL/L (ref 22–29)
CREAT SERPL-MCNC: 0.9 MG/DL (ref 0.7–1.2)
GFR AFRICAN AMERICAN: >60
GFR NON-AFRICAN AMERICAN: >60 ML/MIN/1.73
GLUCOSE BLD-MCNC: 106 MG/DL (ref 74–99)
HCT VFR BLD CALC: 43.5 % (ref 37–54)
HEMOGLOBIN: 14.1 G/DL (ref 12.5–16.5)
MCH RBC QN AUTO: 29 PG (ref 26–35)
MCHC RBC AUTO-ENTMCNC: 32.4 % (ref 32–34.5)
MCV RBC AUTO: 89.5 FL (ref 80–99.9)
PDW BLD-RTO: 13.8 FL (ref 11.5–15)
PLATELET # BLD: 333 E9/L (ref 130–450)
PMV BLD AUTO: 9.4 FL (ref 7–12)
POTASSIUM SERPL-SCNC: 4.1 MMOL/L (ref 3.5–5)
RBC # BLD: 4.86 E12/L (ref 3.8–5.8)
SODIUM BLD-SCNC: 140 MMOL/L (ref 132–146)
TOTAL PROTEIN: 7.8 G/DL (ref 6.4–8.3)
WBC # BLD: 6.6 E9/L (ref 4.5–11.5)

## 2019-06-17 PROCEDURE — 80053 COMPREHEN METABOLIC PANEL: CPT

## 2019-06-17 PROCEDURE — 36415 COLL VENOUS BLD VENIPUNCTURE: CPT

## 2019-06-17 PROCEDURE — 85027 COMPLETE CBC AUTOMATED: CPT

## 2019-06-17 ASSESSMENT — PAIN SCALES - GENERAL: PAINLEVEL_OUTOF10: 6

## 2019-06-17 ASSESSMENT — PAIN DESCRIPTION - ORIENTATION: ORIENTATION: LEFT

## 2019-06-17 ASSESSMENT — PAIN DESCRIPTION - LOCATION: LOCATION: BACK;LEG

## 2019-06-17 ASSESSMENT — PAIN DESCRIPTION - PAIN TYPE: TYPE: CHRONIC PAIN

## 2019-06-17 ASSESSMENT — PAIN DESCRIPTION - DESCRIPTORS: DESCRIPTORS: ACHING;DISCOMFORT

## 2019-06-17 NOTE — PROGRESS NOTES
Pt has previously had a chipped tooth while being intubated he now has a cap on the front tooth of his mouth, he is recommending that precaution is taken with anesthesia. Pt has bilateral hearing aids in and states he is not comfortable removing them as he only has 50% of hearing in both ears.

## 2019-06-23 ENCOUNTER — ANESTHESIA EVENT (OUTPATIENT)
Dept: OPERATING ROOM | Age: 64
DRG: 621 | End: 2019-06-23
Payer: MEDICARE

## 2019-06-23 NOTE — ANESTHESIA PRE PROCEDURE
Department of Anesthesiology  Preprocedure Note       Name:  Maruice Mathias   Age:  58 y.o.  :  1955                                          MRN:  [id number]         Date:  2019      Surgeon: Cynthia Castillo):  Fanny Arce MD    Procedure: GASTRIC BYPASS KARINA-EN-Y LAPAROSCOPIC (N/A )    Medications prior to admission:   Prior to Admission medications    Medication Sig Start Date End Date Taking? Authorizing Provider   omeprazole (PRILOSEC) 20 MG delayed release capsule Take 1 capsule by mouth Daily 19  Fanny Arce MD   ondansetron (ZOFRAN ODT) 4 MG disintegrating tablet Take 1 tablet by mouth every 8 hours as needed for Nausea or Vomiting 19   Fanny Arce MD   Multiple Vitamin (MULTI VITAMIN MENS PO) Take 1 tablet by mouth daily    Historical Provider, MD   levothyroxine (SYNTHROID) 75 MCG tablet Take 75 mcg by mouth daily 10/2/17   Historical Provider, MD   dofetilide (TIKOSYN) 500 MCG capsule Take 500 mg by mouth 2 times daily 18   Historical Provider, MD   rivaroxaban (XARELTO) 20 MG TABS tablet Take 20 mg by mouth daily Last dose 2019 7/3/17   Historical Provider, MD   carvedilol (COREG) 6.25 MG tablet Take 6.25 mg by mouth 2 times daily 5/15/17   Historical Provider, MD   ranitidine (RANITIDINE 150 MAX STRENGTH) 150 MG tablet Take 150 mg by mouth daily as needed for Heartburn     Historical Provider, MD   Ascorbic Acid (VITAMIN C) 1000 MG tablet Take 1,000 mg by mouth daily    Historical Provider, MD       Current medications:    No current facility-administered medications for this encounter.       Current Outpatient Medications   Medication Sig Dispense Refill    omeprazole (PRILOSEC) 20 MG delayed release capsule Take 1 capsule by mouth Daily 30 capsule 12    ondansetron (ZOFRAN ODT) 4 MG disintegrating tablet Take 1 tablet by mouth every 8 hours as needed for Nausea or Vomiting 10 tablet 0    Multiple Vitamin (MULTI VITAMIN MENS PO) Take 1 tablet by mouth daily      levothyroxine (SYNTHROID) 75 MCG tablet Take 75 mcg by mouth daily      dofetilide (TIKOSYN) 500 MCG capsule Take 500 mg by mouth 2 times daily      rivaroxaban (XARELTO) 20 MG TABS tablet Take 20 mg by mouth daily Last dose 5/29/2019      carvedilol (COREG) 6.25 MG tablet Take 6.25 mg by mouth 2 times daily      ranitidine (RANITIDINE 150 MAX STRENGTH) 150 MG tablet Take 150 mg by mouth daily as needed for Heartburn       Ascorbic Acid (VITAMIN C) 1000 MG tablet Take 1,000 mg by mouth daily         Allergies:     Allergies   Allergen Reactions    Atorvastatin Calcium Other (See Comments)     myalgias    Lactose Diarrhea       Problem List:    Patient Active Problem List   Diagnosis Code    Gastroesophageal reflux disease without esophagitis K21.9    Chest pain R07.9    Colon cancer screening Z12.11       Past Medical History:        Diagnosis Date    Arthritis     Atrial fibrillation (HCC)     Back pain     CPAP (continuous positive airway pressure) dependence     setting 15    Difficulty swallowing     DJD (degenerative joint disease)     GERD (gastroesophageal reflux disease)     High cholesterol     Hx of cardiovascular stress test 04/12/2019    Lexiscan stress test    Knee pain     Morbid obesity due to excess calories ([address]     Non-stress test nonreactive     2011    PONV (postoperative nausea and vomiting)     Problems with hearing     Sleep apnea     SOBOE (shortness of breath on exertion)     Thyroid disease        Past Surgical History:        Procedure Laterality Date    APPENDECTOMY      BACK SURGERY      laminectomy     CARDIOVERSION      2 years ago dr [facility]    CHOLECYSTECTOMY      COLONOSCOPY      COLONOSCOPY  05/31/2019    COLONOSCOPY N/A 5/31/2019    COLONOSCOPY performed by Tatiana Albarran MD at [address] [place] [address] ARTHROSCOPY Bilateral 1980's    KNEE SURGERY      both knees- scopes rt x2 left x 1    SHOULDER SURGERY      UPPER GASTROINTESTINAL ENDOSCOPY  03/08/2019    UPPER GASTROINTESTINAL ENDOSCOPY N/A 3/8/2019    EGD BIOPSY performed by Carmen Cotter MD at [address] History:    Social History     Tobacco Use    Smoking status: Never Smoker    Smokeless tobacco: Never Used   Substance Use Topics    Alcohol use: No                                Counseling given: Not Answered      Vital Signs (Current): There were no vitals filed for this visit. BP Readings from Last 3 Encounters:   06/17/19 (!) 148/97   06/05/19 (!) 158/95   05/31/19 (!) 152/83       NPO Status:                                                                                 BMI:   Wt Readings from Last 3 Encounters:   06/17/19 (!) 344 lb (156 kg)   06/05/19 (!) 335 lb (152 kg)   06/05/19 (!) 335 lb (152 kg)     There is no height or weight on file to calculate BMI.    CBC:   Lab Results   Component Value Date    WBC 6.6 06/17/2019    RBC 4.86 06/17/2019    HGB 14.1 06/17/2019    HCT 43.5 06/17/2019    MCV 89.5 06/17/2019    RDW 13.8 06/17/2019     06/17/2019       CMP:   Lab Results   Component Value Date     06/17/2019    K 4.1 06/17/2019    K 3.7 04/11/2019     06/17/2019    CO2 23 06/17/2019    BUN 15 06/17/2019    CREATININE 0.9 06/17/2019    GFRAA >60 06/17/2019    LABGLOM >60 06/17/2019    GLUCOSE 106 06/17/2019    GLUCOSE 95 02/04/2011    PROT 7.8 06/17/2019    CALCIUM 9.1 06/17/2019    BILITOT 0.4 06/17/2019    ALKPHOS 104 06/17/2019    AST 21 06/17/2019    ALT 21 06/17/2019       POC Tests: No results for input(s): POCGLU, POCNA, POCK, POCCL, POCBUN, POCHEMO, POCHCT in the last 72 hours.     Coags: No results found for: PROTIME, INR, APTT    HCG (If Applicable): No results found for: PREGTESTUR, PREGSERUM, HCG, HCGQUANT     ABGs: No results found for: PHART, PO2ART, VJC0OBY, FPW9QLZ, BEART, A4FBEOTC     Type & Screen (If Applicable):  No results found for: LABABO,

## 2019-06-24 ENCOUNTER — HOSPITAL ENCOUNTER (INPATIENT)
Age: 64
LOS: 1 days | Discharge: HOME | DRG: 621 | End: 2019-06-25
Attending: SURGERY | Admitting: SURGERY
Payer: MEDICARE

## 2019-06-24 ENCOUNTER — ANESTHESIA (OUTPATIENT)
Dept: OPERATING ROOM | Age: 64
DRG: 621 | End: 2019-06-24
Payer: MEDICARE

## 2019-06-24 VITALS
OXYGEN SATURATION: 99 % | SYSTOLIC BLOOD PRESSURE: 156 MMHG | RESPIRATION RATE: 1 BRPM | DIASTOLIC BLOOD PRESSURE: 98 MMHG | TEMPERATURE: 97.5 F

## 2019-06-24 PROBLEM — Z98.84 S/P GASTRIC BYPASS: Status: ACTIVE | Noted: 2019-06-24

## 2019-06-24 LAB
ALBUMIN SERPL-MCNC: 4.3 G/DL (ref 3.5–5.2)
ALP BLD-CCNC: 113 U/L (ref 40–129)
ALT SERPL-CCNC: 27 U/L (ref 0–40)
ANION GAP SERPL CALCULATED.3IONS-SCNC: 11 MMOL/L (ref 7–16)
AST SERPL-CCNC: 38 U/L (ref 0–39)
BILIRUB SERPL-MCNC: 0.4 MG/DL (ref 0–1.2)
BUN BLDV-MCNC: 12 MG/DL (ref 8–23)
CALCIUM SERPL-MCNC: 8.8 MG/DL (ref 8.6–10.2)
CHLORIDE BLD-SCNC: 107 MMOL/L (ref 98–107)
CO2: 21 MMOL/L (ref 22–29)
CREAT SERPL-MCNC: 1 MG/DL (ref 0.7–1.2)
EKG ATRIAL RATE: 68 BPM
EKG P AXIS: 91 DEGREES
EKG P-R INTERVAL: 200 MS
EKG Q-T INTERVAL: 464 MS
EKG QRS DURATION: 100 MS
EKG QTC CALCULATION (BAZETT): 493 MS
EKG R AXIS: -38 DEGREES
EKG T AXIS: -40 DEGREES
EKG VENTRICULAR RATE: 68 BPM
GFR AFRICAN AMERICAN: >60
GFR NON-AFRICAN AMERICAN: >60 ML/MIN/1.73
GLUCOSE BLD-MCNC: 115 MG/DL (ref 74–99)
HCT VFR BLD CALC: 41.5 % (ref 37–54)
HEMOGLOBIN: 13.7 G/DL (ref 12.5–16.5)
INR BLD: 1.2
MAGNESIUM: 2 MG/DL (ref 1.6–2.6)
MCH RBC QN AUTO: 29.5 PG (ref 26–35)
MCHC RBC AUTO-ENTMCNC: 33 % (ref 32–34.5)
MCV RBC AUTO: 89.4 FL (ref 80–99.9)
PDW BLD-RTO: 13.7 FL (ref 11.5–15)
PHOSPHORUS: 2.5 MG/DL (ref 2.5–4.5)
PLATELET # BLD: 314 E9/L (ref 130–450)
PMV BLD AUTO: 9.1 FL (ref 7–12)
POTASSIUM SERPL-SCNC: 3.9 MMOL/L (ref 3.5–5)
PROTHROMBIN TIME: 13 SEC (ref 9.3–12.4)
RBC # BLD: 4.64 E12/L (ref 3.8–5.8)
SODIUM BLD-SCNC: 139 MMOL/L (ref 132–146)
TOTAL PROTEIN: 7.7 G/DL (ref 6.4–8.3)
TROPONIN: <0.01 NG/ML (ref 0–0.03)
WBC # BLD: 11.9 E9/L (ref 4.5–11.5)

## 2019-06-24 PROCEDURE — 3700000001 HC ADD 15 MINUTES (ANESTHESIA): Performed by: SURGERY

## 2019-06-24 PROCEDURE — 7100000000 HC PACU RECOVERY - FIRST 15 MIN: Performed by: SURGERY

## 2019-06-24 PROCEDURE — 7100000001 HC PACU RECOVERY - ADDTL 15 MIN: Performed by: SURGERY

## 2019-06-24 PROCEDURE — 3700000000 HC ANESTHESIA ATTENDED CARE: Performed by: SURGERY

## 2019-06-24 PROCEDURE — 1200000000 HC SEMI PRIVATE

## 2019-06-24 PROCEDURE — 2720000010 HC SURG SUPPLY STERILE: Performed by: SURGERY

## 2019-06-24 PROCEDURE — 93010 ELECTROCARDIOGRAM REPORT: CPT | Performed by: INTERNAL MEDICINE

## 2019-06-24 PROCEDURE — 6370000000 HC RX 637 (ALT 250 FOR IP): Performed by: SURGERY

## 2019-06-24 PROCEDURE — 6360000002 HC RX W HCPCS: Performed by: SURGERY

## 2019-06-24 PROCEDURE — 80053 COMPREHEN METABOLIC PANEL: CPT

## 2019-06-24 PROCEDURE — 99024 POSTOP FOLLOW-UP VISIT: CPT | Performed by: SURGERY

## 2019-06-24 PROCEDURE — 85610 PROTHROMBIN TIME: CPT

## 2019-06-24 PROCEDURE — 2709999900 HC NON-CHARGEABLE SUPPLY: Performed by: SURGERY

## 2019-06-24 PROCEDURE — 88307 TISSUE EXAM BY PATHOLOGIST: CPT

## 2019-06-24 PROCEDURE — 43644 LAP GASTRIC BYPASS/ROUX-EN-Y: CPT | Performed by: SURGERY

## 2019-06-24 PROCEDURE — 49905 OMENTAL FLAP INTRA-ABDOM: CPT | Performed by: SURGERY

## 2019-06-24 PROCEDURE — 83735 ASSAY OF MAGNESIUM: CPT

## 2019-06-24 PROCEDURE — 2580000003 HC RX 258: Performed by: SURGERY

## 2019-06-24 PROCEDURE — 84484 ASSAY OF TROPONIN QUANT: CPT

## 2019-06-24 PROCEDURE — 0D164ZA BYPASS STOMACH TO JEJUNUM, PERCUTANEOUS ENDOSCOPIC APPROACH: ICD-10-PCS | Performed by: SURGERY

## 2019-06-24 PROCEDURE — 36415 COLL VENOUS BLD VENIPUNCTURE: CPT

## 2019-06-24 PROCEDURE — 6370000000 HC RX 637 (ALT 250 FOR IP): Performed by: INTERNAL MEDICINE

## 2019-06-24 PROCEDURE — 3600000015 HC SURGERY LEVEL 5 ADDTL 15MIN: Performed by: SURGERY

## 2019-06-24 PROCEDURE — 85027 COMPLETE CBC AUTOMATED: CPT

## 2019-06-24 PROCEDURE — 2500000003 HC RX 250 WO HCPCS: Performed by: SURGERY

## 2019-06-24 PROCEDURE — 2500000003 HC RX 250 WO HCPCS

## 2019-06-24 PROCEDURE — 93005 ELECTROCARDIOGRAM TRACING: CPT | Performed by: INTERNAL MEDICINE

## 2019-06-24 PROCEDURE — 6360000002 HC RX W HCPCS

## 2019-06-24 PROCEDURE — 84100 ASSAY OF PHOSPHORUS: CPT

## 2019-06-24 PROCEDURE — 3600000005 HC SURGERY LEVEL 5 BASE: Performed by: SURGERY

## 2019-06-24 RX ORDER — SODIUM CHLORIDE 0.9 % (FLUSH) 0.9 %
10 SYRINGE (ML) INJECTION PRN
Status: DISCONTINUED | OUTPATIENT
Start: 2019-06-24 | End: 2019-06-25 | Stop reason: HOSPADM

## 2019-06-24 RX ORDER — GLYCOPYRROLATE 1 MG/5 ML
SYRINGE (ML) INTRAVENOUS PRN
Status: DISCONTINUED | OUTPATIENT
Start: 2019-06-24 | End: 2019-06-24 | Stop reason: SDUPTHER

## 2019-06-24 RX ORDER — SODIUM CHLORIDE 0.9 % (FLUSH) 0.9 %
10 SYRINGE (ML) INJECTION PRN
Status: DISCONTINUED | OUTPATIENT
Start: 2019-06-24 | End: 2019-06-24 | Stop reason: HOSPADM

## 2019-06-24 RX ORDER — BUPIVACAINE HYDROCHLORIDE AND EPINEPHRINE 2.5; 5 MG/ML; UG/ML
INJECTION, SOLUTION EPIDURAL; INFILTRATION; INTRACAUDAL; PERINEURAL PRN
Status: DISCONTINUED | OUTPATIENT
Start: 2019-06-24 | End: 2019-06-24 | Stop reason: ALTCHOICE

## 2019-06-24 RX ORDER — EPHEDRINE SULFATE/0.9% NACL/PF 50 MG/5 ML
SYRINGE (ML) INTRAVENOUS PRN
Status: DISCONTINUED | OUTPATIENT
Start: 2019-06-24 | End: 2019-06-24 | Stop reason: SDUPTHER

## 2019-06-24 RX ORDER — SODIUM CHLORIDE, SODIUM LACTATE, POTASSIUM CHLORIDE, CALCIUM CHLORIDE 600; 310; 30; 20 MG/100ML; MG/100ML; MG/100ML; MG/100ML
INJECTION, SOLUTION INTRAVENOUS CONTINUOUS
Status: DISCONTINUED | OUTPATIENT
Start: 2019-06-24 | End: 2019-06-25 | Stop reason: HOSPADM

## 2019-06-24 RX ORDER — SODIUM CHLORIDE 0.9 % (FLUSH) 0.9 %
10 SYRINGE (ML) INJECTION EVERY 12 HOURS SCHEDULED
Status: DISCONTINUED | OUTPATIENT
Start: 2019-06-24 | End: 2019-06-25 | Stop reason: HOSPADM

## 2019-06-24 RX ORDER — MORPHINE SULFATE 4 MG/ML
4 INJECTION, SOLUTION INTRAMUSCULAR; INTRAVENOUS
Status: DISCONTINUED | OUTPATIENT
Start: 2019-06-24 | End: 2019-06-25 | Stop reason: HOSPADM

## 2019-06-24 RX ORDER — LIDOCAINE HYDROCHLORIDE 20 MG/ML
INJECTION, SOLUTION INTRAVENOUS PRN
Status: DISCONTINUED | OUTPATIENT
Start: 2019-06-24 | End: 2019-06-24 | Stop reason: SDUPTHER

## 2019-06-24 RX ORDER — ROCURONIUM BROMIDE 10 MG/ML
INJECTION, SOLUTION INTRAVENOUS PRN
Status: DISCONTINUED | OUTPATIENT
Start: 2019-06-24 | End: 2019-06-24 | Stop reason: SDUPTHER

## 2019-06-24 RX ORDER — PROCHLORPERAZINE EDISYLATE 5 MG/ML
10 INJECTION INTRAMUSCULAR; INTRAVENOUS EVERY 6 HOURS PRN
Status: DISCONTINUED | OUTPATIENT
Start: 2019-06-24 | End: 2019-06-25 | Stop reason: HOSPADM

## 2019-06-24 RX ORDER — DEXAMETHASONE SODIUM PHOSPHATE 10 MG/ML
INJECTION, SOLUTION INTRAMUSCULAR; INTRAVENOUS PRN
Status: DISCONTINUED | OUTPATIENT
Start: 2019-06-24 | End: 2019-06-24 | Stop reason: SDUPTHER

## 2019-06-24 RX ORDER — CARVEDILOL 6.25 MG/1
6.25 TABLET ORAL 2 TIMES DAILY
Status: DISCONTINUED | OUTPATIENT
Start: 2019-06-24 | End: 2019-06-25 | Stop reason: HOSPADM

## 2019-06-24 RX ORDER — FENTANYL CITRATE 50 UG/ML
INJECTION, SOLUTION INTRAMUSCULAR; INTRAVENOUS PRN
Status: DISCONTINUED | OUTPATIENT
Start: 2019-06-24 | End: 2019-06-24 | Stop reason: SDUPTHER

## 2019-06-24 RX ORDER — SODIUM CHLORIDE 0.9 % (FLUSH) 0.9 %
10 SYRINGE (ML) INJECTION EVERY 12 HOURS SCHEDULED
Status: DISCONTINUED | OUTPATIENT
Start: 2019-06-24 | End: 2019-06-24 | Stop reason: HOSPADM

## 2019-06-24 RX ORDER — HYDROMORPHONE HYDROCHLORIDE 1 MG/ML
0.25 INJECTION, SOLUTION INTRAMUSCULAR; INTRAVENOUS; SUBCUTANEOUS EVERY 5 MIN PRN
Status: DISCONTINUED | OUTPATIENT
Start: 2019-06-24 | End: 2019-06-24 | Stop reason: HOSPADM

## 2019-06-24 RX ORDER — SCOLOPAMINE TRANSDERMAL SYSTEM 1 MG/1
1 PATCH, EXTENDED RELEASE TRANSDERMAL
Status: DISCONTINUED | OUTPATIENT
Start: 2019-06-24 | End: 2019-06-24

## 2019-06-24 RX ORDER — SODIUM CHLORIDE, SODIUM LACTATE, POTASSIUM CHLORIDE, CALCIUM CHLORIDE 600; 310; 30; 20 MG/100ML; MG/100ML; MG/100ML; MG/100ML
INJECTION, SOLUTION INTRAVENOUS CONTINUOUS
Status: DISCONTINUED | OUTPATIENT
Start: 2019-06-24 | End: 2019-06-24

## 2019-06-24 RX ORDER — ONDANSETRON 2 MG/ML
4 INJECTION INTRAMUSCULAR; INTRAVENOUS
Status: DISCONTINUED | OUTPATIENT
Start: 2019-06-24 | End: 2019-06-24 | Stop reason: HOSPADM

## 2019-06-24 RX ORDER — MEPERIDINE HYDROCHLORIDE 25 MG/ML
12.5 INJECTION INTRAMUSCULAR; INTRAVENOUS; SUBCUTANEOUS EVERY 5 MIN PRN
Status: DISCONTINUED | OUTPATIENT
Start: 2019-06-24 | End: 2019-06-24 | Stop reason: HOSPADM

## 2019-06-24 RX ORDER — PANTOPRAZOLE SODIUM 40 MG/1
40 TABLET, DELAYED RELEASE ORAL
Status: DISCONTINUED | OUTPATIENT
Start: 2019-06-24 | End: 2019-06-25 | Stop reason: HOSPADM

## 2019-06-24 RX ORDER — ONDANSETRON 2 MG/ML
4 INJECTION INTRAMUSCULAR; INTRAVENOUS ONCE
Status: COMPLETED | OUTPATIENT
Start: 2019-06-24 | End: 2019-06-24

## 2019-06-24 RX ORDER — MIDAZOLAM HYDROCHLORIDE 1 MG/ML
INJECTION INTRAMUSCULAR; INTRAVENOUS PRN
Status: DISCONTINUED | OUTPATIENT
Start: 2019-06-24 | End: 2019-06-24 | Stop reason: SDUPTHER

## 2019-06-24 RX ORDER — OMEPRAZOLE 20 MG/1
20 CAPSULE, DELAYED RELEASE ORAL DAILY
Status: DISCONTINUED | OUTPATIENT
Start: 2019-06-24 | End: 2019-06-24 | Stop reason: CLARIF

## 2019-06-24 RX ORDER — MORPHINE SULFATE 2 MG/ML
2 INJECTION, SOLUTION INTRAMUSCULAR; INTRAVENOUS
Status: DISCONTINUED | OUTPATIENT
Start: 2019-06-24 | End: 2019-06-25 | Stop reason: HOSPADM

## 2019-06-24 RX ORDER — HYDROMORPHONE HYDROCHLORIDE 1 MG/ML
0.5 INJECTION, SOLUTION INTRAMUSCULAR; INTRAVENOUS; SUBCUTANEOUS EVERY 5 MIN PRN
Status: DISCONTINUED | OUTPATIENT
Start: 2019-06-24 | End: 2019-06-24 | Stop reason: HOSPADM

## 2019-06-24 RX ORDER — ASCORBIC ACID 500 MG
1000 TABLET ORAL DAILY
Status: DISCONTINUED | OUTPATIENT
Start: 2019-06-24 | End: 2019-06-25 | Stop reason: HOSPADM

## 2019-06-24 RX ORDER — LEVOTHYROXINE SODIUM 0.07 MG/1
75 TABLET ORAL DAILY
Status: DISCONTINUED | OUTPATIENT
Start: 2019-06-24 | End: 2019-06-25 | Stop reason: HOSPADM

## 2019-06-24 RX ORDER — DOFETILIDE 0.5 MG/1
500 CAPSULE ORAL 2 TIMES DAILY
Status: DISCONTINUED | OUTPATIENT
Start: 2019-06-24 | End: 2019-06-25 | Stop reason: HOSPADM

## 2019-06-24 RX ORDER — ONDANSETRON 2 MG/ML
4 INJECTION INTRAMUSCULAR; INTRAVENOUS EVERY 6 HOURS PRN
Status: DISCONTINUED | OUTPATIENT
Start: 2019-06-24 | End: 2019-06-25 | Stop reason: HOSPADM

## 2019-06-24 RX ORDER — NEOSTIGMINE METHYLSULFATE 1 MG/ML
INJECTION, SOLUTION INTRAVENOUS PRN
Status: DISCONTINUED | OUTPATIENT
Start: 2019-06-24 | End: 2019-06-24 | Stop reason: SDUPTHER

## 2019-06-24 RX ORDER — KETOROLAC TROMETHAMINE 30 MG/ML
INJECTION, SOLUTION INTRAMUSCULAR; INTRAVENOUS PRN
Status: DISCONTINUED | OUTPATIENT
Start: 2019-06-24 | End: 2019-06-24 | Stop reason: SDUPTHER

## 2019-06-24 RX ORDER — PROPOFOL 10 MG/ML
INJECTION, EMULSION INTRAVENOUS PRN
Status: DISCONTINUED | OUTPATIENT
Start: 2019-06-24 | End: 2019-06-24 | Stop reason: SDUPTHER

## 2019-06-24 RX ADMIN — SODIUM CHLORIDE, POTASSIUM CHLORIDE, SODIUM LACTATE AND CALCIUM CHLORIDE: 600; 310; 30; 20 INJECTION, SOLUTION INTRAVENOUS at 13:56

## 2019-06-24 RX ADMIN — DOFETILIDE 500 MCG: 0.5 CAPSULE ORAL at 21:24

## 2019-06-24 RX ADMIN — CARVEDILOL 6.25 MG: 6.25 TABLET, FILM COATED ORAL at 21:24

## 2019-06-24 RX ADMIN — PANTOPRAZOLE SODIUM 40 MG: 40 TABLET, DELAYED RELEASE ORAL at 16:19

## 2019-06-24 RX ADMIN — Medication 10 MG: at 11:59

## 2019-06-24 RX ADMIN — PHENYLEPHRINE HYDROCHLORIDE 100 MCG: 10 INJECTION INTRAVENOUS at 11:48

## 2019-06-24 RX ADMIN — OXYCODONE HYDROCHLORIDE AND ACETAMINOPHEN 1000 MG: 500 TABLET ORAL at 16:19

## 2019-06-24 RX ADMIN — MORPHINE SULFATE 4 MG: 4 INJECTION INTRAVENOUS at 16:18

## 2019-06-24 RX ADMIN — SODIUM CHLORIDE, POTASSIUM CHLORIDE, SODIUM LACTATE AND CALCIUM CHLORIDE: 600; 310; 30; 20 INJECTION, SOLUTION INTRAVENOUS at 12:05

## 2019-06-24 RX ADMIN — RIVAROXABAN 20 MG: 20 TABLET, FILM COATED ORAL at 16:18

## 2019-06-24 RX ADMIN — Medication 0.6 MG: at 12:16

## 2019-06-24 RX ADMIN — ONDANSETRON 4 MG: 2 INJECTION INTRAMUSCULAR; INTRAVENOUS at 10:54

## 2019-06-24 RX ADMIN — MORPHINE SULFATE 4 MG: 4 INJECTION INTRAVENOUS at 13:56

## 2019-06-24 RX ADMIN — FENTANYL CITRATE 150 MCG: 50 INJECTION, SOLUTION INTRAMUSCULAR; INTRAVENOUS at 11:02

## 2019-06-24 RX ADMIN — KETOROLAC TROMETHAMINE 30 MG: 30 INJECTION, SOLUTION INTRAMUSCULAR; INTRAVENOUS at 12:16

## 2019-06-24 RX ADMIN — MORPHINE SULFATE 4 MG: 4 INJECTION INTRAVENOUS at 19:05

## 2019-06-24 RX ADMIN — DEXTROSE MONOHYDRATE 3 G: 50 INJECTION, SOLUTION INTRAVENOUS at 19:05

## 2019-06-24 RX ADMIN — PROCHLORPERAZINE EDISYLATE 10 MG: 5 INJECTION INTRAMUSCULAR; INTRAVENOUS at 15:41

## 2019-06-24 RX ADMIN — DEXTROSE MONOHYDRATE 3 G: 50 INJECTION, SOLUTION INTRAVENOUS at 11:02

## 2019-06-24 RX ADMIN — PROPOFOL 200 MG: 10 INJECTION, EMULSION INTRAVENOUS at 11:02

## 2019-06-24 RX ADMIN — Medication 50 MG: at 11:03

## 2019-06-24 RX ADMIN — ONDANSETRON 4 MG: 2 INJECTION INTRAMUSCULAR; INTRAVENOUS at 14:00

## 2019-06-24 RX ADMIN — LEVOTHYROXINE SODIUM 75 MCG: 75 TABLET ORAL at 16:18

## 2019-06-24 RX ADMIN — LIDOCAINE HYDROCHLORIDE 60 MG: 20 INJECTION, SOLUTION INTRAVENOUS at 11:02

## 2019-06-24 RX ADMIN — Medication 3 MG: at 12:16

## 2019-06-24 RX ADMIN — DEXAMETHASONE SODIUM PHOSPHATE 10 MG: 10 INJECTION, SOLUTION INTRAMUSCULAR; INTRAVENOUS at 11:02

## 2019-06-24 RX ADMIN — SODIUM CHLORIDE, POTASSIUM CHLORIDE, SODIUM LACTATE AND CALCIUM CHLORIDE: 600; 310; 30; 20 INJECTION, SOLUTION INTRAVENOUS at 10:55

## 2019-06-24 RX ADMIN — MORPHINE SULFATE 4 MG: 4 INJECTION INTRAVENOUS at 21:24

## 2019-06-24 RX ADMIN — PHENYLEPHRINE HYDROCHLORIDE 150 MCG: 10 INJECTION INTRAVENOUS at 11:55

## 2019-06-24 RX ADMIN — MIDAZOLAM 2 MG: 1 INJECTION INTRAMUSCULAR; INTRAVENOUS at 10:58

## 2019-06-24 RX ADMIN — FENTANYL CITRATE 50 MCG: 50 INJECTION, SOLUTION INTRAMUSCULAR; INTRAVENOUS at 12:10

## 2019-06-24 RX ADMIN — FENTANYL CITRATE 50 MCG: 50 INJECTION, SOLUTION INTRAMUSCULAR; INTRAVENOUS at 12:18

## 2019-06-24 ASSESSMENT — PULMONARY FUNCTION TESTS
PIF_VALUE: 22
PIF_VALUE: 21
PIF_VALUE: 1
PIF_VALUE: 20
PIF_VALUE: 24
PIF_VALUE: 21
PIF_VALUE: 22
PIF_VALUE: 21
PIF_VALUE: 22
PIF_VALUE: 23
PIF_VALUE: 21
PIF_VALUE: 20
PIF_VALUE: 22
PIF_VALUE: 22
PIF_VALUE: 21
PIF_VALUE: 5
PIF_VALUE: 23
PIF_VALUE: 22
PIF_VALUE: 22
PIF_VALUE: 20
PIF_VALUE: 14
PIF_VALUE: 21
PIF_VALUE: 22
PIF_VALUE: 1
PIF_VALUE: 21
PIF_VALUE: 22
PIF_VALUE: 21
PIF_VALUE: 22
PIF_VALUE: 22
PIF_VALUE: 20
PIF_VALUE: 27
PIF_VALUE: 0
PIF_VALUE: 21
PIF_VALUE: 14
PIF_VALUE: 38
PIF_VALUE: 2
PIF_VALUE: 1
PIF_VALUE: 38
PIF_VALUE: 13
PIF_VALUE: 0
PIF_VALUE: 22
PIF_VALUE: 21
PIF_VALUE: 22
PIF_VALUE: 20
PIF_VALUE: 22
PIF_VALUE: 21
PIF_VALUE: 22
PIF_VALUE: 41
PIF_VALUE: 22
PIF_VALUE: 19
PIF_VALUE: 22
PIF_VALUE: 8
PIF_VALUE: 2
PIF_VALUE: 0
PIF_VALUE: 13
PIF_VALUE: 22
PIF_VALUE: 23
PIF_VALUE: 22
PIF_VALUE: 0
PIF_VALUE: 22
PIF_VALUE: 23
PIF_VALUE: 22
PIF_VALUE: 21
PIF_VALUE: 22
PIF_VALUE: 22
PIF_VALUE: 21
PIF_VALUE: 22
PIF_VALUE: 22
PIF_VALUE: 23
PIF_VALUE: 22

## 2019-06-24 ASSESSMENT — PAIN SCALES - GENERAL
PAINLEVEL_OUTOF10: 6
PAINLEVEL_OUTOF10: 0
PAINLEVEL_OUTOF10: 8
PAINLEVEL_OUTOF10: 6
PAINLEVEL_OUTOF10: 7
PAINLEVEL_OUTOF10: 7
PAINLEVEL_OUTOF10: 6
PAINLEVEL_OUTOF10: 0
PAINLEVEL_OUTOF10: 5

## 2019-06-24 ASSESSMENT — ENCOUNTER SYMPTOMS: SHORTNESS OF BREATH: 0

## 2019-06-24 ASSESSMENT — PAIN DESCRIPTION - DESCRIPTORS: DESCRIPTORS: ACHING

## 2019-06-24 ASSESSMENT — PAIN - FUNCTIONAL ASSESSMENT: PAIN_FUNCTIONAL_ASSESSMENT: 0-10

## 2019-06-24 NOTE — CONSULTS
orthopnea.   Gastrointestinal:  Nausea, vomiting, diarrhea, heartburn, constipation, abdominal pain, hematemesis, hematochezia, melena, acholic stools  Genito-Urinary:  Dysuria, urgency, frequency, hematuria  Musculoskeletal:  Joint pain, joint stiffness, joint swelling, muscle pain  Neurology:  Headache, focal neurological deficits, weakness, numbness, paresthesia  Derm:  Rashes, ulcers, excoriations, bruising  Extremities:  Decreased ROM, peripheral edema, mottling    Past Medical Hx:  Past Medical History:   Diagnosis Date    Arthritis     Atrial fibrillation (HCC)     Back pain     CPAP (continuous positive airway pressure) dependence     setting 15    Difficulty swallowing     DJD (degenerative joint disease)     GERD (gastroesophageal reflux disease)     High cholesterol     Hx of cardiovascular stress test 04/12/2019    Lexiscan stress test    Knee pain     Morbid obesity due to excess calories (HCC)     Non-stress test nonreactive     2011    PONV (postoperative nausea and vomiting)     Problems with hearing     Sleep apnea     SOBOE (shortness of breath on exertion)     Thyroid disease        Past Surgical Hx:  Past Surgical History:   Procedure Laterality Date    APPENDECTOMY      BACK SURGERY      laminectomy     CARDIOVERSION      2 years ago dr [facility]    CHOLECYSTECTOMY      COLONOSCOPY      COLONOSCOPY  05/31/2019    COLONOSCOPY N/A 5/31/2019    COLONOSCOPY performed by Ansley Olivares MD at [address] [place] [address] ARTHROSCOPY Bilateral 1980's    KNEE SURGERY      both knees- scopes rt x2 left x 1    SHOULDER SURGERY      UPPER GASTROINTESTINAL ENDOSCOPY  03/08/2019    UPPER GASTROINTESTINAL ENDOSCOPY N/A 3/8/2019    EGD BIOPSY performed by Ansley Olivares MD at [address] [place] [address] Hx:  Family History   Problem Relation Age of Onset    Dementia Mother     Heart Disease Father     No Known Problems Sister     No Known Problems Brother     Cancer Maternal

## 2019-06-24 NOTE — ANESTHESIA POSTPROCEDURE EVALUATION
Department of Anesthesiology  Postprocedure Note    Patient: Óscar Pillai  MRN: [id number]  YOB: 1955  Date of evaluation: 6/24/2019  Time:  3:47 PM     Procedure Summary     Date:  06/24/19 Room / Location:  [place] [address] / [facility]    Anesthesia Start:  4889 Anesthesia Stop:  7369    Procedure:  GASTRIC BYPASS KARINA-EN-Y LAPAROSCOPIC (N/A ) Diagnosis:  (MORBID OBESITY)    Surgeon:  Norma Kelley MD Responsible Provider:  Ga Miranda MD    Anesthesia Type:  general ASA Status:  3          Anesthesia Type: general    Brigette Phase I: Brigette Score: 9    Brigette Phase II:      Last vitals: Reviewed and per EMR flowsheets.        Anesthesia Post Evaluation    Patient location during evaluation: PACU  Patient participation: complete - patient participated  Level of consciousness: awake and alert  Airway patency: patent  Nausea & Vomiting: no vomiting and no nausea  Complications: no  Cardiovascular status: hemodynamically stable  Respiratory status: acceptable  Hydration status: stable

## 2019-06-24 NOTE — H&P
History and Physical  Gastric Bypass            CHIEF COMPLAINT: Morbid obesity, malnutrition, Hypothyroidism, GERD and Degenerative Joint Disease (DJD)           HISTORY OF PRESENT ILLNESS: Panda Phelan is a morbidly-obese 58 y.o.  male, who weighs (!) 335 lb (152 kg). He is 135 pounds over his ideal body weight. The Body mass index is 41.87 kg/m². He has lost 21 pounds over the past several months in preparation for surgery. He has multiple medical problems aggravated by his obesity. He wishes to have a gastric bypass so that he can lose more weight and keep the weight off. I have met with him on 2 different occasions in [facility], where we discussed the surgery in great detail and went over the risks and benefits. He has watched our informational video so he understands all of the extensive risks involved.  He states that he understands all of these risks and wishes to proceed.     Past Medical History      Patient Active Problem List   Diagnosis    Gastroesophageal reflux disease without esophagitis    Chest pain    Colon cancer screening      Past Surgical History         Past Surgical History:   Procedure Laterality Date    APPENDECTOMY        BACK SURGERY         laminectomy     CARDIOVERSION         2 years ago dr bolanos [facility]    CHOLECYSTECTOMY        COLONOSCOPY        COLONOSCOPY   05/31/2019    COLONOSCOPY N/A 5/31/2019     COL[facility]COPY performed by Tat[facility]ran [facility] both knees- scopes rt x2 left x 1    SHOULDER SURGERY        UPPER GASTROINTESTINAL ENDOSCOPY   03/08/2019    UPPER GASTROINTESTINAL ENDOSCOPY N/A 3/8/2019     EGD BIOPSY performed by Tatiana Albarran MD at [facility] ENDOSCOPY         Allergies: Atorvastatin calcium and Lactose      Medications:  Current Facility-Administered Medications          Current Outpatient Medications   Medication Sig Dispense Refill    HYDROcodone-acetaminophen (NORCO) 5-325 MG per tablet Take 1 tablet by mouth every 6 hours as needed for Pain for up to 3 days. 10 tablet 0    omeprazole (PRILOSEC) 20 MG delayed release capsule Take 1 capsule by mouth Daily 30 capsule 12    ondansetron (ZOFRAN ODT) 4 MG disintegrating tablet Take 1 tablet by mouth every 8 hours as needed for Nausea or Vomiting 10 tablet 0    Multiple Vitamin (MULTI VITAMIN MENS PO) Take 1 tablet by mouth daily        levothyroxine (SYNTHROID) 75 MCG tablet Take 75 mcg by mouth daily        dofetilide (TIKOSYN) 500 MCG capsule Take 500 mg by mouth 2 times daily        rivaroxaban (XARELTO) 20 MG TABS tablet Take 20 mg by mouth daily Last dose 5/29/2019        carvedilol (COREG) 6.25 MG tablet Take 6.25 mg by mouth 2 times daily        ranitidine (RANITIDINE 150 MAX STRENGTH) 150 MG tablet Take 150 mg by mouth daily as needed for Heartburn         Ascorbic Acid (VITAMIN C) 1000 MG tablet Take 1,000 mg by mouth daily          No current facility-administered medications for this visit.             Social History           Tobacco Use    Smoking status: Never Smoker    Smokeless tobacco: Never Used   Substance Use Topics    Alcohol use: No      Review of Systems     Review of Systems - General ROS: negative for - chills, fatigue or malaise  ENT ROS: negative for - hearing change, nasal congestion or nasal discharge  Allergy and Immunology ROS: negative for - hives, itchy/watery eyes or nasal congestion  Hematological and Lymphatic ROS: negative for - blood clots, blood transfusions, bruising or fatigue  Endocrine ROS: negative for - malaise/lethargy, mood swings, palpitations or polydipsia/polyuria  Breast ROS: negative for - new or changing breast lumps or nipple changes  Respiratory ROS: negative for - sputum changes, stridor, tachypnea or wheezing  Cardiovascular ROS: negative for - irregular heartbeat, loss of consciousness, murmur or orthopnea  Gastrointestinal ROS: negative for - constipation, diarrhea, gas/bloating, heartburn or hematemesis  Genito-Urinary ROS: negative for - erectile dysfunction, genital discharge, genital ulcers or hematuria  Musculoskeletal ROS: negative for - gait disturbance, muscle pain or muscular weakness        Physical Exam:   There were no vitals taken for this visit.        General appearance: alert, appears stated age and cooperative, does ambulate easily. Head: Normocephalic, without obvious abnormality, atraumatic  Neck: no adenopathy, no carotid bruit, no JVD, supple, symmetrical, trachea midline and thyroid not enlarged,   Lungs: clear to auscultation bilaterally  Heart: regular rate and rhythm  Abdomen: soft, non-tender; bowel sounds normal; no masses,  no organomegaly  Extremities: extremities normal, atraumatic, no cyanosis or edema     Assessment:  Morbid obesity with failure of conservative therapy. Patient has been cleared psychologically and medically. The gall bladder ultrasound was normal. Upper endoscopy showed no hiatal hernia. The patient was informed that risks include, but are not limited to: death, anastomotic leak, obstruction, bleeding, and sepsis. Any of these could require further surgery. Other risks include heart attack, DVT, PE, pneumonia, hernia, wound infection, the need for dilatations of the gastrojejunostomy, and the inability to lose appropriate weight and keep it off. We may not be able to do a Gastic Bypass, in that case we will do a Sleeve Gastrectomy. We discussed that our goal is to ameliorate the medical problems and not to obtain a specific body mass index. He understands the risks and benefits and wishes to proceed with the procedure. He has signed a consent form.         Plan:  ([id number]) Laparoscopic Rafael-en-Y Gastric Bypass possible hiatal hernia repair, possible robot assistance.  He does not need Lovenox post-op.     Physician Signature: Electronically signed by Dr. Norma Kelley MD

## 2019-06-24 NOTE — OP NOTE
through the anastomosis into the jejunum. All of the mucosa looked healthy. The scope was withdrawn. The anastomosis was wrapped with omentum and tacked with a v lock suture. All of the fluid in the abdomen was removed with suction. All of the CO2 was released. The trocars were removed. Skin wounds were closed with 4-0 Monocryl dermal stitches and Derma+flex glue was applied. The needle and sponge count were correct. The patient tolerated the procedure well and went to recovery in stable condition.      Physician Signature: Electronically signed by Dr. Ovidio Hall M.D.

## 2019-06-25 VITALS
RESPIRATION RATE: 16 BRPM | OXYGEN SATURATION: 96 % | SYSTOLIC BLOOD PRESSURE: 121 MMHG | WEIGHT: 315 LBS | HEIGHT: 75 IN | TEMPERATURE: 98.1 F | HEART RATE: 66 BPM | BODY MASS INDEX: 39.17 KG/M2 | DIASTOLIC BLOOD PRESSURE: 71 MMHG

## 2019-06-25 PROBLEM — G47.33 OSA (OBSTRUCTIVE SLEEP APNEA): Status: ACTIVE | Noted: 2019-06-25

## 2019-06-25 LAB
HCT VFR BLD CALC: 39.1 % (ref 37–54)
HEMOGLOBIN: 12.9 G/DL (ref 12.5–16.5)
MCH RBC QN AUTO: 29.4 PG (ref 26–35)
MCHC RBC AUTO-ENTMCNC: 33 % (ref 32–34.5)
MCV RBC AUTO: 89.1 FL (ref 80–99.9)
PDW BLD-RTO: 13.6 FL (ref 11.5–15)
PLATELET # BLD: 305 E9/L (ref 130–450)
PMV BLD AUTO: 9.4 FL (ref 7–12)
RBC # BLD: 4.39 E12/L (ref 3.8–5.8)
TSH SERPL DL<=0.05 MIU/L-ACNC: 0.9 UIU/ML (ref 0.27–4.2)
WBC # BLD: 13.4 E9/L (ref 4.5–11.5)

## 2019-06-25 PROCEDURE — 99024 POSTOP FOLLOW-UP VISIT: CPT | Performed by: SURGERY

## 2019-06-25 PROCEDURE — 2580000003 HC RX 258: Performed by: SURGERY

## 2019-06-25 PROCEDURE — 94660 CPAP INITIATION&MGMT: CPT

## 2019-06-25 PROCEDURE — 85027 COMPLETE CBC AUTOMATED: CPT

## 2019-06-25 PROCEDURE — 84443 ASSAY THYROID STIM HORMONE: CPT

## 2019-06-25 PROCEDURE — 6360000002 HC RX W HCPCS: Performed by: SURGERY

## 2019-06-25 PROCEDURE — 36415 COLL VENOUS BLD VENIPUNCTURE: CPT

## 2019-06-25 PROCEDURE — 6370000000 HC RX 637 (ALT 250 FOR IP): Performed by: INTERNAL MEDICINE

## 2019-06-25 RX ADMIN — MORPHINE SULFATE 4 MG: 4 INJECTION INTRAVENOUS at 00:04

## 2019-06-25 RX ADMIN — MORPHINE SULFATE 2 MG: 2 INJECTION, SOLUTION INTRAMUSCULAR; INTRAVENOUS at 12:26

## 2019-06-25 RX ADMIN — DOFETILIDE 500 MCG: 0.5 CAPSULE ORAL at 09:25

## 2019-06-25 RX ADMIN — PROCHLORPERAZINE EDISYLATE 10 MG: 5 INJECTION INTRAMUSCULAR; INTRAVENOUS at 06:35

## 2019-06-25 RX ADMIN — LEVOTHYROXINE SODIUM 75 MCG: 75 TABLET ORAL at 05:37

## 2019-06-25 RX ADMIN — MORPHINE SULFATE 4 MG: 4 INJECTION INTRAVENOUS at 02:30

## 2019-06-25 RX ADMIN — OXYCODONE HYDROCHLORIDE AND ACETAMINOPHEN 1000 MG: 500 TABLET ORAL at 09:21

## 2019-06-25 RX ADMIN — DEXTROSE MONOHYDRATE 3 G: 50 INJECTION, SOLUTION INTRAVENOUS at 05:37

## 2019-06-25 RX ADMIN — CARVEDILOL 6.25 MG: 6.25 TABLET, FILM COATED ORAL at 09:21

## 2019-06-25 RX ADMIN — PANTOPRAZOLE SODIUM 40 MG: 40 TABLET, DELAYED RELEASE ORAL at 05:37

## 2019-06-25 RX ADMIN — RIVAROXABAN 20 MG: 20 TABLET, FILM COATED ORAL at 09:21

## 2019-06-25 ASSESSMENT — PAIN DESCRIPTION - PAIN TYPE: TYPE: ACUTE PAIN;SURGICAL PAIN

## 2019-06-25 ASSESSMENT — PAIN SCALES - GENERAL
PAINLEVEL_OUTOF10: 6
PAINLEVEL_OUTOF10: 4
PAINLEVEL_OUTOF10: 7
PAINLEVEL_OUTOF10: 7
PAINLEVEL_OUTOF10: 6

## 2019-06-25 ASSESSMENT — PAIN DESCRIPTION - LOCATION: LOCATION: ABDOMEN

## 2019-06-25 ASSESSMENT — PAIN DESCRIPTION - ORIENTATION: ORIENTATION: MID;UPPER

## 2019-06-25 ASSESSMENT — PAIN DESCRIPTION - FREQUENCY: FREQUENCY: CONTINUOUS

## 2019-06-25 ASSESSMENT — PAIN DESCRIPTION - DESCRIPTORS: DESCRIPTORS: ACHING;DISCOMFORT

## 2019-06-25 NOTE — PROGRESS NOTES
Patient on room air at rest, pulse ox 96%. Pt. Walking in the hallway on room air, pulse ox 94%.
Patient refused CPAP.
Component Value Date    TROPONINI <0.01 06/24/2019     U/A:    Lab Results   Component Value Date    COLORU YELLOW 10/24/2013    PROTEINU NEGATIVE 10/24/2013    PHUR 6.0 10/24/2013    WBCUA 0-1 10/24/2013    RBCUA NONE 10/24/2013    BACTERIA NONE 10/24/2013    CLARITYU SLCLOUDY 10/24/2013    SPECGRAV 1.020 10/24/2013    LEUKOCYTESUR NEGATIVE 10/24/2013    UROBILINOGEN 0.2 10/24/2013    BILIRUBINUR NEGATIVE 10/24/2013    BLOODU NEGATIVE 10/24/2013    GLUCOSEU NEGATIVE 10/24/2013     HgBA1c:    Lab Results   Component Value Date    LABA1C 5.4 04/12/2019     FLP:    Lab Results   Component Value Date    TRIG 221 04/12/2019    HDL 33 04/12/2019    LDLCALC 135 04/12/2019    LABVLDL 44 04/12/2019     TSH:    Lab Results   Component Value Date    TSH 0.899 06/25/2019     LIPASE:  No results found for: LIPASE    No results for input(s): GLUMET in the last 72 hours. No orders to display        sodium chloride flush  10 mL Intravenous 2 times per day    carvedilol  6.25 mg Oral BID    vitamin C  1,000 mg Oral Daily    levothyroxine  75 mcg Oral Daily    rivaroxaban  20 mg Oral Daily    dofetilide  500 mcg Oral BID    pantoprazole  40 mg Oral QAM AC        Assessment; Active Problems:    S/P gastric bypass    ABENA (obstructive sleep apnea)-on CPAP    High cholesterol    Paroxysmal atrial fibrillation on Xarelto      Plan:   Continue home meds  O2 saturation on room air with activity  Discharge home when okay with general surgery    See orders.         Titus Buchanan DO  9:23 AM  6/25/2019

## 2019-06-25 NOTE — DISCHARGE SUMMARY
Physician Discharge Summary     Efrain Brown  [id number]    Admit date: 6/24/2019    Discharge date and time: 6/25/2019     Admitting Physician: Andrew Ward MD     Condition: stable    Patient Active Problem List   Diagnosis    Gastroesophageal reflux disease without esophagitis    Chest pain    Colon cancer screening    S/P gastric bypass    ABENA (obstructive sleep apnea)    High cholesterol    Atrial fibrillation [facility])       Hospital Course: Efrain Brown is a 58 y.o. male one day post-op from a laparoscopic Gastric Bypass. He did well with surgery, pain has been well controlled over night, walking well in the halls. Labs normal. Cast out. He is tolerating the Bariatric clear liquid diet with no nausea, no pain. Incisions all clean and dry, glue in place.     Lab Results   Component Value Date    WBC 13.4 06/25/2019    HGB 12.9 06/25/2019     06/25/2019     06/24/2019     06/24/2019    K 3.9 06/24/2019    K 3.7 04/11/2019    BUN 12 06/24/2019    CREATININE 1.0 06/24/2019    GLUCOSE 115 06/24/2019    GLUCOSE 95 02/04/2011    LABGLOM >60 06/24/2019    PROTIME 13.0 06/24/2019    INR 1.2 06/24/2019    LABALBU 4.3 06/24/2019    LABALBU 4.6 02/04/2011    PROT 7.7 06/24/2019    CALCIUM 8.8 06/24/2019    MG 2.0 06/24/2019    PHOS 2.5 06/24/2019    BILITOT 0.4 06/24/2019    ALKPHOS 113 06/24/2019    AST 38 06/24/2019    ALT 27 06/24/2019       Discharge Exam:   VITALS: /71   Pulse 66   Temp 98.1 °F (36.7 °C) (Oral)   Resp 16   Ht 6' 3\" (1.905 m)   Wt (!) 343 lb 4.8 oz (155.7 kg)   SpO2 92%   BMI 42.91 kg/m²     General appearance: alert, appears stated age and cooperative  Neck: no adenopathy, no carotid bruit, no JVD, supple, symmetrical, trachea midline and thyroid not enlarged, symmetric, no tenderness/mass/nodules  Lungs: clear to auscultation bilaterally  Heart: regular rate and rhythm, S1, S2 normal, no murmur, click, rub or gallop  Abdomen: Incisions clean and dry, surgical glue in place. Extremities: extremities normal, atraumatic, no cyanosis or edema    Disposition: home    Patient Instructions:   Hold medicines for blood pressure, diabetes and cholesterol. Do not take diuretics at home. Take Beta-blockers but decrease the dose by half. Ok to restart Aspirin and other blood thinners. Medication List      CONTINUE taking these medications    carvedilol 6.25 MG tablet  Commonly known as:  COREG     dofetilide 500 MCG capsule  Commonly known as:  TIKOSYN     levothyroxine 75 MCG tablet  Commonly known as:  SYNTHROID     MULTI VITAMIN MENS PO     omeprazole 20 MG delayed release capsule  Commonly known as:  PRILOSEC  Take 1 capsule by mouth Daily     ondansetron 4 MG disintegrating tablet  Commonly known as:  ZOFRAN ODT  Take 1 tablet by mouth every 8 hours as needed for Nausea or Vomiting     RANITIDINE 150 MAX STRENGTH 150 MG tablet  Generic drug:  ranitidine     rivaroxaban 20 MG Tabs tablet  Commonly known as:  XARELTO     vitamin C 1000 MG tablet          Activity:  no lifting, or strenuous exercise for one or two weeks. No driving for one week. Diet:  Bariatric clear liquid for 2 days, then bariatric full liquids for 2 weeks, 1 oz every 15 minutes. Wound Care: shower tomorrow then leave the incisions open to air     Follow-up with Dr. Belgica Pearce in 2 weeks.     Signed:  Norma Kelley  6/25/2019  9:24 AM

## 2019-06-25 NOTE — PLAN OF CARE
Problem: Falls - Risk of:  Goal: Will remain free from falls  Description  Will remain free from falls  Outcome: Met This Shift  Goal: Absence of physical injury  Description  Absence of physical injury  Outcome: Met This Shift     Problem: Pain:  Goal: Pain level will decrease  Description  Pain level will decrease  Outcome: Met This Shift  Goal: Control of acute pain  Description  Control of acute pain  Outcome: Met This Shift  Goal: Control of chronic pain  Description  Control of chronic pain  Outcome: Met This Shift     Problem: Nausea/Vomiting:  Goal: Absence of nausea/vomiting  Description  Absence of nausea/vomiting  Outcome: Met This Shift  Goal: Able to drink  Description  Able to drink  Outcome: Met This Shift  Goal: Able to eat  Description  Able to eat  Outcome: Met This Shift  Goal: Ability to achieve adequate nutritional intake will improve  Description  Ability to achieve adequate nutritional intake will improve  Outcome: Met This Shift

## 2019-06-25 NOTE — PLAN OF CARE
Problem: Falls - Risk of:  Goal: Will remain free from falls  Description  Will remain free from falls  Outcome: Met This Shift     Problem: Falls - Risk of:  Goal: Absence of physical injury  Description  Absence of physical injury  Outcome: Met This Shift     Problem: Pain:  Goal: Pain level will decrease  Description  Pain level will decrease  Outcome: Met This Shift     Problem: Pain:  Goal: Control of acute pain  Description  Control of acute pain  Outcome: Met This Shift     Problem: Pain:  Goal: Control of chronic pain  Description  Control of chronic pain  Outcome: Met This Shift     Problem: Nausea/Vomiting:  Goal: Absence of nausea/vomiting  Description  Absence of nausea/vomiting  Outcome: Met This Shift     Problem: Nausea/Vomiting:  Goal: Able to eat  Description  Able to eat  Outcome: Met This Shift     Problem: Nausea/Vomiting:  Goal: Ability to achieve adequate nutritional intake will improve  Description  Ability to achieve adequate nutritional intake will improve  Outcome: Met This Shift

## 2019-07-01 ENCOUNTER — TELEPHONE (OUTPATIENT)
Dept: BARIATRICS/WEIGHT MGMT | Age: 64
End: 2019-07-01

## 2019-07-01 ENCOUNTER — HOSPITAL ENCOUNTER (OUTPATIENT)
Age: 64
Discharge: HOME | End: 2019-07-03
Payer: MEDICARE

## 2019-07-01 LAB
ALBUMIN SERPL-MCNC: 4.3 G/DL (ref 3.5–5.2)
ALP BLD-CCNC: 89 U/L (ref 40–129)
ALT SERPL-CCNC: 15 U/L (ref 0–40)
ANION GAP SERPL CALCULATED.3IONS-SCNC: 17 MMOL/L (ref 7–16)
AST SERPL-CCNC: 23 U/L (ref 0–39)
BILIRUB SERPL-MCNC: 0.6 MG/DL (ref 0–1.2)
BUN BLDV-MCNC: 17 MG/DL (ref 8–23)
CALCIUM SERPL-MCNC: 9.6 MG/DL (ref 8.6–10.2)
CHLORIDE BLD-SCNC: 104 MMOL/L (ref 98–107)
CHOLESTEROL, TOTAL: 190 MG/DL (ref 0–199)
CO2: 21 MMOL/L (ref 22–29)
CREAT SERPL-MCNC: 1.1 MG/DL (ref 0.7–1.2)
FERRITIN: 184 NG/ML
FOLATE: >20 NG/ML (ref 4.8–24.2)
GFR AFRICAN AMERICAN: >60
GFR NON-AFRICAN AMERICAN: >60 ML/MIN/1.73
GLUCOSE BLD-MCNC: 100 MG/DL (ref 74–99)
HCT VFR BLD CALC: 44.6 % (ref 37–54)
HEMOGLOBIN: 14.8 G/DL (ref 12.5–16.5)
MCH RBC QN AUTO: 29.1 PG (ref 26–35)
MCHC RBC AUTO-ENTMCNC: 33.2 % (ref 32–34.5)
MCV RBC AUTO: 87.6 FL (ref 80–99.9)
PDW BLD-RTO: 13.9 FL (ref 11.5–15)
PLATELET # BLD: 378 E9/L (ref 130–450)
PMV BLD AUTO: 9.7 FL (ref 7–12)
POTASSIUM SERPL-SCNC: 3.6 MMOL/L (ref 3.5–5)
PREALBUMIN: 22 MG/DL (ref 20–40)
RBC # BLD: 5.09 E12/L (ref 3.8–5.8)
SODIUM BLD-SCNC: 142 MMOL/L (ref 132–146)
TOTAL PROTEIN: 8 G/DL (ref 6.4–8.3)
TRIGL SERPL-MCNC: 168 MG/DL (ref 0–149)
VITAMIN B-12: 1286 PG/ML (ref 211–946)
VITAMIN D 25-HYDROXY: 44 NG/ML (ref 30–100)
WBC # BLD: 6.4 E9/L (ref 4.5–11.5)

## 2019-07-01 PROCEDURE — 82746 ASSAY OF FOLIC ACID SERUM: CPT

## 2019-07-01 PROCEDURE — 80053 COMPREHEN METABOLIC PANEL: CPT

## 2019-07-01 PROCEDURE — 82607 VITAMIN B-12: CPT

## 2019-07-01 PROCEDURE — 36415 COLL VENOUS BLD VENIPUNCTURE: CPT

## 2019-07-01 PROCEDURE — 84630 ASSAY OF ZINC: CPT

## 2019-07-01 PROCEDURE — 82728 ASSAY OF FERRITIN: CPT

## 2019-07-01 PROCEDURE — 84425 ASSAY OF VITAMIN B-1: CPT

## 2019-07-01 PROCEDURE — 84134 ASSAY OF PREALBUMIN: CPT

## 2019-07-01 PROCEDURE — 85027 COMPLETE CBC AUTOMATED: CPT

## 2019-07-01 PROCEDURE — 82465 ASSAY BLD/SERUM CHOLESTEROL: CPT

## 2019-07-01 PROCEDURE — 84478 ASSAY OF TRIGLYCERIDES: CPT

## 2019-07-01 PROCEDURE — 82306 VITAMIN D 25 HYDROXY: CPT

## 2019-07-03 ENCOUNTER — OFFICE VISIT (OUTPATIENT)
Dept: BARIATRICS/WEIGHT MGMT | Age: 64
End: 2019-07-03
Payer: MEDICARE

## 2019-07-03 VITALS
TEMPERATURE: 96.8 F | WEIGHT: 315 LBS | SYSTOLIC BLOOD PRESSURE: 144 MMHG | DIASTOLIC BLOOD PRESSURE: 92 MMHG | BODY MASS INDEX: 39.17 KG/M2 | RESPIRATION RATE: 18 BRPM | HEART RATE: 65 BPM | HEIGHT: 75 IN

## 2019-07-03 DIAGNOSIS — K91.2 MALNUTRITION FOLLOWING GASTROINTESTINAL SURGERY: Primary | ICD-10-CM

## 2019-07-03 PROCEDURE — 99024 POSTOP FOLLOW-UP VISIT: CPT | Performed by: SURGERY

## 2019-07-03 PROCEDURE — 99212 OFFICE O/P EST SF 10 MIN: CPT

## 2019-07-03 NOTE — PROGRESS NOTES
[facility]  7/3/2019  Laparoscopic Rafael-en- Y Gastric Bypass  Two weeks Post-Op Follow-up. Subjective:   Ryley Warner is a 58 y.o. male is two weeks post Laparoscopic Rafael-en-Y Gastric Bypass. The patient is not having any pain. Reports no problems with swallowing liquids, bowel movements, voiding, or the wounds. He is not having swallowing difficulty, is compliant most of the time with the multivitamins and calcium + Vit D. He is not meeting fluid recommendations of at least 64 ounces per day and is not meeting protein recommendations. Exercise: no regular exercise. (!) 319 lb (144.7 kg) Today's weight represents a weight loss of 16 pounds since surgery. Prior to Admission medications    Medication Sig Start Date End Date Taking? Authorizing Provider   Calcium Carbonate (CALCI-CHEW PO) Take 3 tablets by mouth daily Indications: Bar Adv   Yes Historical Provider, MD   Cholecalciferol (VITAMIN D3) 5000 units TABS Take 5,000 Units by mouth daily   Yes Historical Provider, MD   IRON PO Take 29 mg by mouth daily   Yes Historical Provider, MD   omeprazole (PRILOSEC) 20 MG delayed release capsule Take 1 capsule by mouth Daily 6/5/19 6/4/20 Yes Flor Colin MD   ondansetron (ZOFRAN ODT) 4 MG disintegrating tablet Take 1 tablet by mouth every 8 hours as needed for Nausea or Vomiting 6/5/19  Yes Flor Colin MD   Multiple Vitamin (MULTI VITAMIN MENS PO) Take 2 tablets by mouth daily Indications:  Bar Adv    Yes Historical Provider, MD   levothyroxine (SYNTHROID) 75 MCG tablet Take 75 mcg by mouth daily 10/2/17  Yes Historical Provider, MD   dofetilide (TIKOSYN) 500 MCG capsule Take 500 mg by mouth 2 times daily 1/19/18  Yes Historical Provider, MD   rivaroxaban (XARELTO) 20 MG TABS tablet Take 20 mg by mouth daily Last dose 5/29/2019 7/3/17  Yes Historical Provider, MD   carvedilol (COREG) 6.25 MG tablet Take 6.25 mg by mouth 2 times daily 5/15/17  Yes Historical Provider, MD   ranitidine

## 2019-07-03 NOTE — PATIENT INSTRUCTIONS
Please continue to take your vitamin and mineral supplements as instructed. If you received a blood work prescription today for laboratory monitoring due prior to your next routine follow-up visit, please have this blood work obtained 10 to 14 days prior to your next visit. It is important to fast for 12 hours prior to routine weight loss surgery blood work, EXCEPT for drinking water, to ensure accuracy of results. Please report nausea, vomiting, abdominal pain, or any other problems you experience to your surgeon. For problems related to weight loss surgery, it is best to go to [address] [place] [address] Emergency Department and have your surgeon paged.

## 2019-07-04 LAB — ZINC: 76.4 UG/DL (ref 60–120)

## 2019-07-08 LAB — VITAMIN B1 WHOLE BLOOD: 55 NMOL/L (ref 70–180)

## 2019-07-15 ENCOUNTER — TELEPHONE (OUTPATIENT)
Dept: BARIATRICS/WEIGHT MGMT | Age: 64
End: 2019-07-15

## 2019-07-15 DIAGNOSIS — E86.0 DEHYDRATION: Primary | ICD-10-CM

## 2019-07-15 NOTE — TELEPHONE ENCOUNTER
Patient called stating he was currently at [address] [place] [address] for heart palpations. They sent him home stating he had palpitations due to dehydration. I set him up for hydration for Wed 7/15 @ 9 am at [facility]. I faxed over order.

## 2019-07-16 DIAGNOSIS — E86.0 DEHYDRATION: ICD-10-CM

## 2019-07-17 ENCOUNTER — HOSPITAL ENCOUNTER (OUTPATIENT)
Dept: INFUSION THERAPY | Age: 64
Setting detail: INFUSION SERIES
Discharge: HOME | End: 2019-07-17
Payer: MEDICARE

## 2019-07-17 VITALS
DIASTOLIC BLOOD PRESSURE: 68 MMHG | TEMPERATURE: 98.1 F | HEART RATE: 66 BPM | OXYGEN SATURATION: 95 % | RESPIRATION RATE: 16 BRPM | SYSTOLIC BLOOD PRESSURE: 109 MMHG

## 2019-07-17 DIAGNOSIS — E86.0 DEHYDRATION: Primary | ICD-10-CM

## 2019-07-17 PROCEDURE — 96361 HYDRATE IV INFUSION ADD-ON: CPT

## 2019-07-17 PROCEDURE — 96365 THER/PROPH/DIAG IV INF INIT: CPT

## 2019-07-17 PROCEDURE — 2500000003 HC RX 250 WO HCPCS: Performed by: SURGERY

## 2019-07-17 PROCEDURE — 2580000003 HC RX 258: Performed by: SURGERY

## 2019-07-17 RX ORDER — SODIUM CHLORIDE 9 MG/ML
INJECTION, SOLUTION INTRAVENOUS CONTINUOUS
Status: ACTIVE | OUTPATIENT
Start: 2019-07-17 | End: 2019-07-17

## 2019-07-17 RX ORDER — SODIUM CHLORIDE 0.9 % (FLUSH) 0.9 %
10 SYRINGE (ML) INJECTION PRN
Status: DISCONTINUED | OUTPATIENT
Start: 2019-07-17 | End: 2019-07-18 | Stop reason: HOSPADM

## 2019-07-17 RX ORDER — HEPARIN SODIUM (PORCINE) LOCK FLUSH IV SOLN 100 UNIT/ML 100 UNIT/ML
500 SOLUTION INTRAVENOUS PRN
Status: CANCELLED | OUTPATIENT
Start: 2019-07-17

## 2019-07-17 RX ORDER — SODIUM CHLORIDE 9 MG/ML
INJECTION, SOLUTION INTRAVENOUS CONTINUOUS
Status: CANCELLED
Start: 2019-07-17

## 2019-07-17 RX ORDER — SODIUM CHLORIDE 0.9 % (FLUSH) 0.9 %
10 SYRINGE (ML) INJECTION PRN
Status: CANCELLED | OUTPATIENT
Start: 2019-07-17

## 2019-07-17 RX ADMIN — ASCORBIC ACID, VITAMIN A PALMITATE, CHOLECALCIFEROL, THIAMINE HYDROCHLORIDE, RIBOFLAVIN-5 PHOSPHATE SODIUM, PYRIDOXINE HYDROCHLORIDE, NIACINAMIDE, DEXPANTHENOL, ALPHA-TOCOPHEROL ACETATE, VITAMIN K1, FOLIC ACID, BIOTIN, CYANOCOBALAMIN: 200; 3300; 200; 6; 3.6; 6; 40; 15; 10; 150; 600; 60; 5 INJECTION, SOLUTION INTRAVENOUS at 10:54

## 2019-07-17 RX ADMIN — Medication 10 ML: at 09:49

## 2019-07-17 RX ADMIN — Medication 10 ML: at 12:04

## 2019-07-17 RX ADMIN — SODIUM CHLORIDE: 9 INJECTION, SOLUTION INTRAVENOUS at 09:49

## 2019-07-26 ENCOUNTER — TELEPHONE (OUTPATIENT)
Dept: BARIATRICS/WEIGHT MGMT | Age: 64
End: 2019-07-26

## 2019-07-26 DIAGNOSIS — E86.0 DEHYDRATION: Primary | ICD-10-CM

## 2019-07-26 RX ORDER — SODIUM CHLORIDE 9 MG/ML
INJECTION, SOLUTION INTRAVENOUS CONTINUOUS
Status: CANCELLED
Start: 2019-07-29

## 2019-07-26 RX ORDER — HEPARIN SODIUM (PORCINE) LOCK FLUSH IV SOLN 100 UNIT/ML 100 UNIT/ML
500 SOLUTION INTRAVENOUS PRN
Status: CANCELLED | OUTPATIENT
Start: 2019-07-29

## 2019-07-26 NOTE — TELEPHONE ENCOUNTER
Patient called in and is still struggling with water and protein intake. He is feeling dehydrated again. Not taking any of his vitamins. He has had no more heart palpitations. I moved his appointment up with Dr. Cruz Sethi for this Wednesday. I reminded him to get labs drawn. Per order of Dr. Cruz Sethi patient is to go Monday for 2 liters of NS one with 1 amp of MVI. Orders placed and faxed to infusion center [facility]. Olivia Lopes

## 2019-07-29 ENCOUNTER — HOSPITAL ENCOUNTER (OUTPATIENT)
Dept: INFUSION THERAPY | Age: 64
Setting detail: INFUSION SERIES
Discharge: HOME | End: 2019-07-29
Payer: MEDICARE

## 2019-07-29 VITALS
HEART RATE: 68 BPM | RESPIRATION RATE: 14 BRPM | SYSTOLIC BLOOD PRESSURE: 122 MMHG | DIASTOLIC BLOOD PRESSURE: 84 MMHG | TEMPERATURE: 97.6 F | OXYGEN SATURATION: 96 %

## 2019-07-29 DIAGNOSIS — E86.0 DEHYDRATION: Primary | ICD-10-CM

## 2019-07-29 PROCEDURE — 96365 THER/PROPH/DIAG IV INF INIT: CPT

## 2019-07-29 PROCEDURE — 2580000003 HC RX 258: Performed by: SURGERY

## 2019-07-29 PROCEDURE — 2500000003 HC RX 250 WO HCPCS: Performed by: SURGERY

## 2019-07-29 PROCEDURE — 96361 HYDRATE IV INFUSION ADD-ON: CPT

## 2019-07-29 PROCEDURE — 96360 HYDRATION IV INFUSION INIT: CPT

## 2019-07-29 PROCEDURE — 2580000003 HC RX 258

## 2019-07-29 RX ORDER — SODIUM CHLORIDE 9 MG/ML
INJECTION, SOLUTION INTRAVENOUS
Status: COMPLETED
Start: 2019-07-29 | End: 2019-07-29

## 2019-07-29 RX ORDER — SODIUM CHLORIDE 9 MG/ML
INJECTION, SOLUTION INTRAVENOUS CONTINUOUS
Status: CANCELLED
Start: 2019-09-29

## 2019-07-29 RX ORDER — SODIUM CHLORIDE 9 MG/ML
INJECTION, SOLUTION INTRAVENOUS CONTINUOUS
Status: ACTIVE | OUTPATIENT
Start: 2019-07-29 | End: 2019-07-29

## 2019-07-29 RX ORDER — HEPARIN SODIUM (PORCINE) LOCK FLUSH IV SOLN 100 UNIT/ML 100 UNIT/ML
500 SOLUTION INTRAVENOUS PRN
Status: CANCELLED | OUTPATIENT
Start: 2019-09-29

## 2019-07-29 RX ORDER — HEPARIN SODIUM (PORCINE) LOCK FLUSH IV SOLN 100 UNIT/ML 100 UNIT/ML
500 SOLUTION INTRAVENOUS PRN
Status: DISCONTINUED | OUTPATIENT
Start: 2019-07-29 | End: 2019-07-30 | Stop reason: HOSPADM

## 2019-07-29 RX ORDER — SODIUM CHLORIDE 0.9 % (FLUSH) 0.9 %
SYRINGE (ML) INJECTION
Status: DISPENSED
Start: 2019-07-29 | End: 2019-07-29

## 2019-07-29 RX ADMIN — SODIUM CHLORIDE: 900 INJECTION, SOLUTION INTRAVENOUS at 11:50

## 2019-07-29 RX ADMIN — SODIUM CHLORIDE: 9 INJECTION, SOLUTION INTRAVENOUS at 11:50

## 2019-07-29 RX ADMIN — ASCORBIC ACID, VITAMIN A PALMITATE, CHOLECALCIFEROL, THIAMINE HYDROCHLORIDE, RIBOFLAVIN-5 PHOSPHATE SODIUM, PYRIDOXINE HYDROCHLORIDE, NIACINAMIDE, DEXPANTHENOL, ALPHA-TOCOPHEROL ACETATE, VITAMIN K1, FOLIC ACID, BIOTIN, CYANOCOBALAMIN: 200; 3300; 200; 6; 3.6; 6; 40; 15; 10; 150; 600; 60; 5 INJECTION, SOLUTION INTRAVENOUS at 10:49

## 2019-07-30 ENCOUNTER — HOSPITAL ENCOUNTER (OUTPATIENT)
Age: 64
Discharge: HOME | End: 2019-08-01
Payer: MEDICARE

## 2019-07-30 DIAGNOSIS — K91.2 MALNUTRITION FOLLOWING GASTROINTESTINAL SURGERY: ICD-10-CM

## 2019-07-30 LAB
ALBUMIN SERPL-MCNC: 4.1 G/DL (ref 3.5–5.2)
ALP BLD-CCNC: 97 U/L (ref 40–129)
ALT SERPL-CCNC: 11 U/L (ref 0–40)
ANION GAP SERPL CALCULATED.3IONS-SCNC: 15 MMOL/L (ref 7–16)
AST SERPL-CCNC: 16 U/L (ref 0–39)
BILIRUB SERPL-MCNC: 0.6 MG/DL (ref 0–1.2)
BUN BLDV-MCNC: 10 MG/DL (ref 8–23)
CALCIUM SERPL-MCNC: 8.9 MG/DL (ref 8.6–10.2)
CHLORIDE BLD-SCNC: 105 MMOL/L (ref 98–107)
CHOLESTEROL, TOTAL: 196 MG/DL (ref 0–199)
CO2: 23 MMOL/L (ref 22–29)
CREAT SERPL-MCNC: 1.1 MG/DL (ref 0.7–1.2)
FERRITIN: 127 NG/ML
FOLATE: 7.9 NG/ML (ref 4.8–24.2)
GFR AFRICAN AMERICAN: >60
GFR NON-AFRICAN AMERICAN: >60 ML/MIN/1.73
GLUCOSE BLD-MCNC: 101 MG/DL (ref 74–99)
HCT VFR BLD CALC: 51.2 % (ref 37–54)
HEMOGLOBIN: 16.7 G/DL (ref 12.5–16.5)
MCH RBC QN AUTO: 29 PG (ref 26–35)
MCHC RBC AUTO-ENTMCNC: 32.6 % (ref 32–34.5)
MCV RBC AUTO: 88.9 FL (ref 80–99.9)
PDW BLD-RTO: 15 FL (ref 11.5–15)
PLATELET # BLD: 248 E9/L (ref 130–450)
PMV BLD AUTO: 10.2 FL (ref 7–12)
POTASSIUM SERPL-SCNC: 3.3 MMOL/L (ref 3.5–5)
PREALBUMIN: 23 MG/DL (ref 20–40)
RBC # BLD: 5.76 E12/L (ref 3.8–5.8)
SODIUM BLD-SCNC: 143 MMOL/L (ref 132–146)
TOTAL PROTEIN: 7.2 G/DL (ref 6.4–8.3)
TRIGL SERPL-MCNC: 157 MG/DL (ref 0–149)
VITAMIN B-12: 666 PG/ML (ref 211–946)
VITAMIN D 25-HYDROXY: 46 NG/ML (ref 30–100)
WBC # BLD: 4.8 E9/L (ref 4.5–11.5)

## 2019-07-30 PROCEDURE — 84425 ASSAY OF VITAMIN B-1: CPT

## 2019-07-30 PROCEDURE — 85027 COMPLETE CBC AUTOMATED: CPT

## 2019-07-30 PROCEDURE — 36415 COLL VENOUS BLD VENIPUNCTURE: CPT

## 2019-07-30 PROCEDURE — 80053 COMPREHEN METABOLIC PANEL: CPT

## 2019-07-30 PROCEDURE — 84630 ASSAY OF ZINC: CPT

## 2019-07-30 PROCEDURE — 82306 VITAMIN D 25 HYDROXY: CPT

## 2019-07-30 PROCEDURE — 82746 ASSAY OF FOLIC ACID SERUM: CPT

## 2019-07-30 PROCEDURE — 84134 ASSAY OF PREALBUMIN: CPT

## 2019-07-30 PROCEDURE — 82728 ASSAY OF FERRITIN: CPT

## 2019-07-30 PROCEDURE — 82607 VITAMIN B-12: CPT

## 2019-07-30 PROCEDURE — 84478 ASSAY OF TRIGLYCERIDES: CPT

## 2019-07-30 PROCEDURE — 82465 ASSAY BLD/SERUM CHOLESTEROL: CPT

## 2019-07-31 ENCOUNTER — OFFICE VISIT (OUTPATIENT)
Dept: BARIATRICS/WEIGHT MGMT | Age: 64
End: 2019-07-31
Payer: MEDICARE

## 2019-07-31 ENCOUNTER — INITIAL CONSULT (OUTPATIENT)
Dept: BARIATRICS/WEIGHT MGMT | Age: 64
End: 2019-07-31
Payer: MEDICARE

## 2019-07-31 VITALS
TEMPERATURE: 96.9 F | WEIGHT: 305 LBS | HEART RATE: 68 BPM | DIASTOLIC BLOOD PRESSURE: 94 MMHG | HEIGHT: 75 IN | RESPIRATION RATE: 20 BRPM | BODY MASS INDEX: 37.92 KG/M2 | SYSTOLIC BLOOD PRESSURE: 142 MMHG

## 2019-07-31 VITALS — HEIGHT: 75 IN | BODY MASS INDEX: 37.92 KG/M2 | WEIGHT: 305 LBS

## 2019-07-31 DIAGNOSIS — K91.2 MALNUTRITION FOLLOWING GASTROINTESTINAL SURGERY: Primary | ICD-10-CM

## 2019-07-31 DIAGNOSIS — Z71.3 NUTRITIONAL COUNSELING: Primary | ICD-10-CM

## 2019-07-31 PROCEDURE — 99999 PR OFFICE/OUTPT VISIT,PROCEDURE ONLY: CPT | Performed by: SURGERY

## 2019-07-31 PROCEDURE — 99212 OFFICE O/P EST SF 10 MIN: CPT

## 2019-07-31 PROCEDURE — 99024 POSTOP FOLLOW-UP VISIT: CPT | Performed by: SURGERY

## 2019-07-31 NOTE — PROGRESS NOTES
Additional Education:  Pt needed to see the Lee Mcdonald at the [facility] but left d/t trying to sneak out. Pt and significant other state he does not feel that  he needs dietary counseling. Pt was called back in d/t dietary non-compliance creating medical problems and complications. Pt does not see how his dietary non-compliance can be contributing to medical problems and complications D/T his significant other having sx two weeks prior to him and is totally non-compliant with the diet and has no problems or complications with the foods she is eating that are not listed on the diet. Pt significant other is trying to feed the pt the same food she is eating when she is non-compliant and is also being a food pusher and pt is having significant medical problems and complications requiring multiple IV hydration, n/v, weakness, dehydration. Lee Mcdonald reviewed why compliancy is so important to both of the patients in order to prevent any medical problems or complications but pt states I have to say this to the patients as a dietitian but this is not what everyone is currently doing. Lee Mcdonald reviewed with the pt that majority of my patients have no problems or complications after weight loss sx because they are complaint with the instruction that is given. Lee Mcdonald also reviewed that those patients that have medical problems and complications are non-compliant with the diet. Lee Mcdonald stressed compliancy. There have been some probable dietary / medical compliance issues here. I have discussed with him the great importance of following the treatment plan exactly as directed in order to achieve a good medical outcome. Pt openly admits to eating blackberries, watermelon, eggs, toast, whole sugar items, bananas and regular sugar popsicles on a Bariatric Puree Diet. None of the items listed above are listed on a Bariatric Puree Diet. Wife states it is okay because she is feeding him since he will not eat.   Lee Mcdonald reviewed with both patients that it is not okay because the problems that he is having are d/t non-compliance and can be creating more problems and complications after weight loss sx. Lee Mcdonald spent a great deal of time reviewing dietary and supplement guidelines. Lee Mcdonald educated pt on how to increase water intake and protein intake. Lee Mcdonald enc pt start reading food labels as instructed for hiddden sugars and hidden fats to prevent dumping syndrome. Lee Mcdonald stressed the importance of meeting protein needs along with taking the recommended vitamins and supplements after weight loss sx to prevent PEM and micro nutrient deficiencies that can lead to medical problems and complications. Lee Mcdonald enc pt to weigh and measure all meals. Lee Mcdonald reviewed portions should be 3 oz in size 6 times daily. Patient was instructed on the importance of increasing water intake to 60 - 90 oz. of water total daily. Pt. was also instructed he / she is allowed an additional 30 oz. of sugar free caffeine free clear liquid beverages for a total of 90 oz. of fluid total daily. Pt. was able to verbalize how he / she can get more water and fluids within the diet. Pt. verbalized understanding. Patient was instructed by the LEE / SUSHMA based on 24 Hour Recall, Food Records and review of labs to increase oral protein within the diet by selecting high protein foods. RD / LD reviewed high protein foods with the patient and reviewed through meal planning that 1/2 if not 3/4 of every meal should consist of some source of oral protein intake. Pt. is aware his / her protein needs are 90 - 100 grams grams daily. Pt. is able to verbalize how to increase oral protein intake within his / her own diet. LEE / SUSHMA recommends the use of a protein supplement to meet the patients overall protein intake of 90 - 100 grams grams total daily.   Pt. currently at this time after reviewing food records, 24 hour recall , labs and medical history is not able to consume enough protein from food consumption alone. Pt. is able to verbalize what type of protein supplement he / she will add to his / her meal plan and is able to verbalize how to mix the protein supplement to meet his / her dietary needs. Pt. is able to verbalize understanding of increasing protein needs. Lee Mcdonald enc daily food records. MEDICAL NUTRITION THERAPY (MNT) - Nutrition Care Plan   (The patient has been educated and given written education material that reinforces the following dietary guidelines for Bariatric Surgery)  Goal:  Patient able to verbalize the following dietary concepts:  3 to 4 ounce portions per meal     6 small meals daily      60 to 80 grams of protein   48 to 64 ounces of fluid daily (water)     Always consume protein item first with all meals   Pt is aware wt loss is pt controlled. Slow Meals - 30-35 chews per bite / Meals 30 - 45 minutes long            The RD / LD reviewed with the patient that the dietary goals of the bariatric patient is to ensure that patient is able to meet established nutritional needs for a Bariatric Surgery  Patient at this time in order to promote healing, prevent significant weight changes, prevent skin breakdown and abnormal lab values, prevent complications, and tolerance to diet and texture of foods. Pt is able to identify proper food choices and needed changes and is able to explain proper food preparation. RD / LD reviewed compliance with assigned diet stages, volume of food and drink consumed, timing of meals, amount of protein and energy intake, daily vitamin and mineral supplementation, identify food cravings and any adverse reactions associated with intake of food and drink. RD / LD uses behavioral tools such as goal setting, MI, and self-monitoring, to reinforce the anatomic and physiologic effects of the surgery, so that the described behaviors become more of a habit not simply a reaction to the altered anatomy.      Care Plan:   · Rd/Ld Addressed Food Records or 24-Hour

## 2019-08-02 LAB — ZINC: 67.8 UG/DL (ref 60–120)

## 2019-08-03 LAB — VITAMIN B1 WHOLE BLOOD: 82 NMOL/L (ref 70–180)

## 2019-08-08 ENCOUNTER — TELEPHONE (OUTPATIENT)
Dept: BARIATRICS/WEIGHT MGMT | Age: 64
End: 2019-08-08

## 2019-08-08 NOTE — TELEPHONE ENCOUNTER
Patient called in and is vomiting and nauseated. He admits to only drinking 1/2 a bottle of water. He is eating all kinds of food not on the diet yet. I spent a long time reviewing diet and importance of water intake. He seems to only want a quick fix to the problem. He refused to have me set him up for hydration therapy. He is taking his omeprazole as ordered. He will recall me if needed. He refused to make appointment with Dr. Erika Padilla.

## 2019-08-20 ENCOUNTER — HOSPITAL ENCOUNTER (OUTPATIENT)
Dept: INFUSION THERAPY | Age: 64
Setting detail: INFUSION SERIES
Discharge: HOME | End: 2019-08-20
Payer: MEDICARE

## 2019-08-20 ENCOUNTER — TELEPHONE (OUTPATIENT)
Dept: BARIATRICS/WEIGHT MGMT | Age: 64
End: 2019-08-20

## 2019-08-20 VITALS
TEMPERATURE: 98 F | HEART RATE: 55 BPM | OXYGEN SATURATION: 96 % | SYSTOLIC BLOOD PRESSURE: 103 MMHG | RESPIRATION RATE: 20 BRPM | DIASTOLIC BLOOD PRESSURE: 63 MMHG

## 2019-08-20 DIAGNOSIS — E86.0 DEHYDRATION: Primary | ICD-10-CM

## 2019-08-20 DIAGNOSIS — E86.0 DEHYDRATION, MODERATE: Primary | ICD-10-CM

## 2019-08-20 PROCEDURE — 2580000003 HC RX 258: Performed by: FAMILY MEDICINE

## 2019-08-20 PROCEDURE — 2500000003 HC RX 250 WO HCPCS: Performed by: FAMILY MEDICINE

## 2019-08-20 PROCEDURE — 96365 THER/PROPH/DIAG IV INF INIT: CPT

## 2019-08-20 PROCEDURE — 96361 HYDRATE IV INFUSION ADD-ON: CPT

## 2019-08-20 RX ORDER — SODIUM CHLORIDE 9 MG/ML
INJECTION, SOLUTION INTRAVENOUS CONTINUOUS
Status: ACTIVE | OUTPATIENT
Start: 2019-08-20 | End: 2019-08-20

## 2019-08-20 RX ORDER — HEPARIN SODIUM (PORCINE) LOCK FLUSH IV SOLN 100 UNIT/ML 100 UNIT/ML
500 SOLUTION INTRAVENOUS PRN
Status: CANCELLED | OUTPATIENT
Start: 2019-08-20

## 2019-08-20 RX ORDER — SODIUM CHLORIDE 9 MG/ML
INJECTION, SOLUTION INTRAVENOUS CONTINUOUS
Status: CANCELLED
Start: 2019-08-20

## 2019-08-20 RX ORDER — SODIUM CHLORIDE 0.9 % (FLUSH) 0.9 %
10 SYRINGE (ML) INJECTION PRN
Status: CANCELLED | OUTPATIENT
Start: 2019-08-20

## 2019-08-20 RX ORDER — SODIUM CHLORIDE 0.9 % (FLUSH) 0.9 %
10 SYRINGE (ML) INJECTION PRN
Status: DISCONTINUED | OUTPATIENT
Start: 2019-08-20 | End: 2019-08-20

## 2019-08-20 RX ADMIN — SODIUM CHLORIDE: 9 INJECTION, SOLUTION INTRAVENOUS at 11:38

## 2019-08-20 RX ADMIN — Medication 10 ML: at 11:36

## 2019-08-20 RX ADMIN — ASCORBIC ACID, VITAMIN A PALMITATE, CHOLECALCIFEROL, THIAMINE HYDROCHLORIDE, RIBOFLAVIN-5 PHOSPHATE SODIUM, PYRIDOXINE HYDROCHLORIDE, NIACINAMIDE, DEXPANTHENOL, ALPHA-TOCOPHEROL ACETATE, VITAMIN K1, FOLIC ACID, BIOTIN, CYANOCOBALAMIN: 200; 3300; 200; 6; 3.6; 6; 40; 15; 10; 150; 600; 60; 5 INJECTION, SOLUTION INTRAVENOUS at 12:42

## 2019-08-20 RX ADMIN — Medication 10 ML: at 13:51

## 2019-08-21 ENCOUNTER — OFFICE VISIT (OUTPATIENT)
Dept: BARIATRICS/WEIGHT MGMT | Age: 64
End: 2019-08-21
Payer: MEDICARE

## 2019-08-21 VITALS
WEIGHT: 297 LBS | BODY MASS INDEX: 36.93 KG/M2 | HEART RATE: 58 BPM | DIASTOLIC BLOOD PRESSURE: 73 MMHG | HEIGHT: 75 IN | SYSTOLIC BLOOD PRESSURE: 107 MMHG | RESPIRATION RATE: 20 BRPM | TEMPERATURE: 96.7 F

## 2019-08-21 DIAGNOSIS — R11.15 INTRACTABLE CYCLICAL VOMITING WITH NAUSEA: Primary | ICD-10-CM

## 2019-08-21 PROCEDURE — 99024 POSTOP FOLLOW-UP VISIT: CPT | Performed by: SURGERY

## 2019-08-21 PROCEDURE — 99212 OFFICE O/P EST SF 10 MIN: CPT

## 2019-08-21 NOTE — PATIENT INSTRUCTIONS
Please continue to take your vitamin and mineral supplements as instructed. If you received a blood work prescription today for laboratory monitoring due prior to your next routine follow-up visit, please have this blood work obtained 10 to 14 days prior to your next visit. It is important to fast for 12 hours prior to routine weight loss surgery blood work, EXCEPT for drinking water, to ensure accuracy of results. Please report nausea, vomiting, abdominal pain, or any other problems you experience to your surgeon. For problems related to weight loss surgery, it is best to go to [address] [place] [address] Emergency Department and have your surgeon paged.

## 2019-08-21 NOTE — PROGRESS NOTES
Bariatric Surgery History and Physical    Patient's Name/Date of Birth: Snehal Ruiz / 1955    Date: August 21, 2019     Surgeon: Prabhakar Jean-Baptiste M.D.    PCP: Stephania Smith     Chief Complaint: nausea     HPI:   Snehal Ruiz is a 58 y.o. male who presents for evaluation of nausea and food intolerance s/p rygb in june. Timing is constant and not progressing but not improving.      Past Medical History:   Diagnosis Date    Arthritis     Atrial fibrillation (HCC)     Back pain     CPAP (continuous positive airway pressure) dependence     setting 15    Difficulty swallowing     DJD (degenerative joint disease)     GERD (gastroesophageal reflux disease)     High cholesterol     Hx of cardiovascular stress test 04/12/2019    Lexiscan stress test    Knee pain     Morbid obesity due to excess calories ([address]     Non-stress test nonreactive     2011    ABENA (obstructive sleep apnea) 6/25/2019    PONV (postoperative nausea and vomiting)     Problems with hearing     Sleep apnea     SOBOE (shortness of breath on exertion)     Thyroid disease        Past Surgical History:   Procedure Laterality Date    APPENDECTOMY      BACK SURGERY      laminectomy     CARDIOVERSION      2 years ago dr bolanos [facility]    CHOLECYSTECTOMY      COLONOSCOPY      COLONOSCOPY  05/31/2019    COLONOSCOPY N/A 5/31/2019    COLONOSCOPY performed by Kendra Suggs MD at [address] ARTHROSCOPY Bilateral 1980's    KNEE SURGERY      both knees- scopes rt x2 left x 1    KARINA-EN-Y GASTRIC BYPASS N/A 6/24/2019    GASTRIC BYPASS KARINA-EN-Y LAPAROSCOPIC performed by Kendra Suggs MD at [address] ENDOSCOPY  03/08/2019    UPPER GASTROINTESTINAL ENDOSCOPY N/A 3/8/2019    EGD BIOPSY performed by Kendra Suggs MD at [address] [facility] ENDOSCOPY       Current Outpatient Medications   Medication Sig Dispense Refill    CIPRODEX 0.3-0.1 % otic suspension SHAKE LQ AND INT 3 GTS IN AU Smoking status: Never Smoker    Smokeless tobacco: Never Used   Substance and Sexual Activity    Alcohol use: No    Drug use: No    Sexual activity: Yes   Lifestyle    Physical activity:     Days per week: Not on file     Minutes per session: Not on file    Stress: Not on file   Relationships    Social connections:     Talks on phone: Not on file     Gets together: Not on file     Attends Spiritism service: Not on file     Active member of club or organization: Not on file     Attends meetings of clubs or organizations: Not on file     Relationship status: Not on file    Intimate partner violence:     Fear of current or ex partner: Not on file     Emotionally abused: Not on file     Physically abused: Not on file     Forced sexual activity: Not on file   Other Topics Concern    Not on file   Social History Narrative    Not on file           Review of Systems  Review of Systems -  General ROS: negative for - chills, fatigue or malaise  ENT ROS: negative for - hearing change, nasal congestion or nasal discharge  Allergy and Immunology ROS: negative for - hives, itchy/watery eyes or nasal congestion  Hematological and Lymphatic ROS: negative for - blood clots, blood transfusions, bruising or fatigue  Endocrine ROS: negative for - malaise/lethargy, mood swings, palpitations or polydipsia/polyuria  Respiratory ROS: negative for - sputum changes, stridor, tachypnea or wheezing  Cardiovascular ROS: negative for - irregular heartbeat, loss of consciousness, murmur or orthopnea  Gastrointestinal ROS: negative for - constipation, diarrhea, gas/bloating, heartburn or hematemesis  Genito-Urinary ROS: negative for -  genital discharge, genital ulcers or hematuria  Musculoskeletal ROS: negative for - gait disturbance, muscle pain or muscular weakness    Physical exam:   /73 (Site: Left Upper Arm, Position: Sitting, Cuff Size: Medium Adult)   Pulse 58   Temp 96.7 °F (35.9 °C) (Temporal)   Resp 20   Ht 6' 3\" (1.905 m)   Wt 297 lb (134.7 kg)   BMI 37.12 kg/m²   General appearance:  NAD  Head: NCAT, PERRLA, EOMI, red conjunctiva  Neck: supple, no masses  Lungs: CTAB, equal chest rise bilateral  Heart: Reg rate  Abdomen: soft, nontender, nondistended  Skin; no lesions  Gu: no cva tenderness  Extremities: extremities normal, atraumatic, no cyanosis or edema      Assessment:  58 y.o. male with nausea and food intolerance s/p rygb     Plan: Will do EGD and if no pathology will discuss feeding tube surgically.   Kendra Suggs MD  3:11 PM  8/21/2019

## 2019-08-22 ENCOUNTER — PREP FOR PROCEDURE (OUTPATIENT)
Dept: SURGERY | Age: 64
End: 2019-08-22

## 2019-08-22 RX ORDER — SODIUM CHLORIDE, SODIUM LACTATE, POTASSIUM CHLORIDE, CALCIUM CHLORIDE 600; 310; 30; 20 MG/100ML; MG/100ML; MG/100ML; MG/100ML
INJECTION, SOLUTION INTRAVENOUS CONTINUOUS
Status: CANCELLED | OUTPATIENT
Start: 2019-08-22

## 2019-08-30 ENCOUNTER — HOSPITAL ENCOUNTER (OUTPATIENT)
Age: 64
Setting detail: OUTPATIENT SURGERY
Discharge: HOME | End: 2019-08-30
Attending: SURGERY | Admitting: SURGERY
Payer: MEDICARE

## 2019-08-30 ENCOUNTER — ANESTHESIA (OUTPATIENT)
Dept: ENDOSCOPY | Age: 64
End: 2019-08-30
Payer: MEDICARE

## 2019-08-30 ENCOUNTER — ANESTHESIA EVENT (OUTPATIENT)
Dept: ENDOSCOPY | Age: 64
End: 2019-08-30
Payer: MEDICARE

## 2019-08-30 VITALS
SYSTOLIC BLOOD PRESSURE: 118 MMHG | HEIGHT: 75 IN | OXYGEN SATURATION: 97 % | WEIGHT: 287.9 LBS | BODY MASS INDEX: 35.8 KG/M2 | TEMPERATURE: 97.1 F | DIASTOLIC BLOOD PRESSURE: 69 MMHG | RESPIRATION RATE: 18 BRPM | HEART RATE: 92 BPM

## 2019-08-30 VITALS
DIASTOLIC BLOOD PRESSURE: 74 MMHG | RESPIRATION RATE: 34 BRPM | OXYGEN SATURATION: 96 % | SYSTOLIC BLOOD PRESSURE: 121 MMHG

## 2019-08-30 PROCEDURE — 6360000002 HC RX W HCPCS: Performed by: NURSE ANESTHETIST, CERTIFIED REGISTERED

## 2019-08-30 PROCEDURE — 3700000001 HC ADD 15 MINUTES (ANESTHESIA): Performed by: SURGERY

## 2019-08-30 PROCEDURE — 99024 POSTOP FOLLOW-UP VISIT: CPT | Performed by: SURGERY

## 2019-08-30 PROCEDURE — 88342 IMHCHEM/IMCYTCHM 1ST ANTB: CPT

## 2019-08-30 PROCEDURE — 3700000000 HC ANESTHESIA ATTENDED CARE: Performed by: SURGERY

## 2019-08-30 PROCEDURE — 88305 TISSUE EXAM BY PATHOLOGIST: CPT

## 2019-08-30 PROCEDURE — 7100000011 HC PHASE II RECOVERY - ADDTL 15 MIN: Performed by: SURGERY

## 2019-08-30 PROCEDURE — 2709999900 HC NON-CHARGEABLE SUPPLY: Performed by: SURGERY

## 2019-08-30 PROCEDURE — 7100000010 HC PHASE II RECOVERY - FIRST 15 MIN: Performed by: SURGERY

## 2019-08-30 PROCEDURE — 2580000003 HC RX 258: Performed by: SURGERY

## 2019-08-30 PROCEDURE — 3609012400 HC EGD TRANSORAL BIOPSY SINGLE/MULTIPLE: Performed by: SURGERY

## 2019-08-30 PROCEDURE — 43239 EGD BIOPSY SINGLE/MULTIPLE: CPT | Performed by: SURGERY

## 2019-08-30 RX ORDER — PROPOFOL 10 MG/ML
INJECTION, EMULSION INTRAVENOUS PRN
Status: DISCONTINUED | OUTPATIENT
Start: 2019-08-30 | End: 2019-08-30 | Stop reason: SDUPTHER

## 2019-08-30 RX ORDER — ONDANSETRON 2 MG/ML
INJECTION INTRAMUSCULAR; INTRAVENOUS PRN
Status: DISCONTINUED | OUTPATIENT
Start: 2019-08-30 | End: 2019-08-30 | Stop reason: SDUPTHER

## 2019-08-30 RX ORDER — SODIUM CHLORIDE, SODIUM LACTATE, POTASSIUM CHLORIDE, CALCIUM CHLORIDE 600; 310; 30; 20 MG/100ML; MG/100ML; MG/100ML; MG/100ML
INJECTION, SOLUTION INTRAVENOUS CONTINUOUS
Status: DISCONTINUED | OUTPATIENT
Start: 2019-08-30 | End: 2019-08-30 | Stop reason: HOSPADM

## 2019-08-30 RX ADMIN — SODIUM CHLORIDE, POTASSIUM CHLORIDE, SODIUM LACTATE AND CALCIUM CHLORIDE: 600; 310; 30; 20 INJECTION, SOLUTION INTRAVENOUS at 09:29

## 2019-08-30 RX ADMIN — ONDANSETRON HYDROCHLORIDE 4 MG: 2 INJECTION, SOLUTION INTRAMUSCULAR; INTRAVENOUS at 09:46

## 2019-08-30 RX ADMIN — SODIUM CHLORIDE, POTASSIUM CHLORIDE, SODIUM LACTATE AND CALCIUM CHLORIDE: 600; 310; 30; 20 INJECTION, SOLUTION INTRAVENOUS at 09:40

## 2019-08-30 RX ADMIN — PROPOFOL 100 MG: 10 INJECTION, EMULSION INTRAVENOUS at 09:52

## 2019-08-30 RX ADMIN — PROPOFOL 100 MG: 10 INJECTION, EMULSION INTRAVENOUS at 09:51

## 2019-08-30 ASSESSMENT — PAIN - FUNCTIONAL ASSESSMENT: PAIN_FUNCTIONAL_ASSESSMENT: 0-10

## 2019-08-30 ASSESSMENT — PAIN SCALES - GENERAL
PAINLEVEL_OUTOF10: 0
PAINLEVEL_OUTOF10: 0

## 2019-08-30 ASSESSMENT — ENCOUNTER SYMPTOMS: SHORTNESS OF BREATH: 0

## 2019-08-30 NOTE — H&P
Current Facility-Administered Medications          Current Outpatient Medications   Medication Sig Dispense Refill    CIPRODEX 0.3-0.1 % otic suspension SHAKE LQ AND INT 3 GTS IN AU BID FOR 5 DAYS PRN   0    aspirin 81 MG tablet Take 81 mg by mouth daily        Calcium Carbonate (CALCI-CHEW PO) Take 3 tablets by mouth daily Indications: Bar Adv        Cholecalciferol (VITAMIN D3) 5000 units TABS Take 5,000 Units by mouth daily        IRON PO Take 29 mg by mouth daily        omeprazole (PRILOSEC) 20 MG delayed release capsule Take 1 capsule by mouth Daily 30 capsule 12    ondansetron (ZOFRAN ODT) 4 MG disintegrating tablet Take 1 tablet by mouth every 8 hours as needed for Nausea or Vomiting 10 tablet 0    Multiple Vitamin (MULTI VITAMIN MENS PO) Take 2 tablets by mouth daily Indications:  Bar Adv         levothyroxine (SYNTHROID) 75 MCG tablet Take 75 mcg by mouth daily        dofetilide (TIKOSYN) 500 MCG capsule Take 500 mg by mouth 2 times daily        rivaroxaban (XARELTO) 20 MG TABS tablet Take 20 mg by mouth daily Last dose 5/29/2019        carvedilol (COREG) 6.25 MG tablet Take 6.25 mg by mouth 2 times daily        ranitidine (RANITIDINE 150 MAX STRENGTH) 150 MG tablet Take 150 mg by mouth daily as needed for Heartburn         Ascorbic Acid (VITAMIN C) 1000 MG tablet Take 1,000 mg by mouth daily          No current facility-administered medications for this visit.                   Allergies   Allergen Reactions    Atorvastatin Calcium Other (See Comments)       myalgias    Lactose Diarrhea         The patient has a family history that is negative for severe cardiovascular or respiratory issues, negative for reaction to anesthesia.     Social History               Socioeconomic History    Marital status: Life Partner       Spouse name: Not on file    Number of children: Not on file    Years of education: Not on file    Highest education level: Not on file   Occupational History    Not on file   Social Needs    Financial resource strain: Not on file    Food insecurity:       Worry: Not on file       Inability: Not on file    Transportation needs:       Medical: Not on file       Non-medical: Not on file   Tobacco Use    Smoking status: Never Smoker    Smokeless tobacco: Never Used   Substance and Sexual Activity    Alcohol use: No    Drug use: No    Sexual activity: Yes   Lifestyle    Physical activity:       Days per week: Not on file       Minutes per session: Not on file    Stress: Not on file   Relationships    Social connections:       Talks on phone: Not on file       Gets together: Not on file       Attends Scientologist service: Not on file       Active member of club or organization: Not on file       Attends meetings of clubs or organizations: Not on file       Relationship status: Not on file    Intimate partner violence:       Fear of current or ex partner: Not on file       Emotionally abused: Not on file       Physically abused: Not on file       Forced sexual activity: Not on file   Other Topics Concern    Not on file   Social History Narrative    Not on file                  Review of Systems  Review of Systems -  General ROS: negative for - chills, fatigue or malaise  ENT ROS: negative for - hearing change, nasal congestion or nasal discharge  Allergy and Immunology ROS: negative for - hives, itchy/watery eyes or nasal congestion  Hematological and Lymphatic ROS: negative for - blood clots, blood transfusions, bruising or fatigue  Endocrine ROS: negative for - malaise/lethargy, mood swings, palpitations or polydipsia/polyuria  Respiratory ROS: negative for - sputum changes, stridor, tachypnea or wheezing  Cardiovascular ROS: negative for - irregular heartbeat, loss of consciousness, murmur or orthopnea  Gastrointestinal ROS: negative for - constipation, diarrhea, gas/bloating, heartburn or hematemesis  Genito-Urinary ROS: negative for -  genital discharge, genital

## 2019-08-30 NOTE — OP NOTE
SURGEON: Sultana Andrade M.D. PREOPERATIVE DIAGNOSES:  S/p rygb with nausea and emesis    POSTOPERATIVE DIAGNOSES: diverticulum at anastomosis     OPERATION: Kxxtadtk-orwvek-kcvxzdltieg with biopsy    BLOOD LOSS: 0ML    ANESTHESIA: LMAC    COMPLICATIONS: None. OPERATIONS: The patient was placed on the table in left lateral decubitus position and sedated. Bite block was placed. A lubricated scope was easily passed into the upper esophagus which looked normal. The distal esophagus looked normal. The scope was passed into the stomach pouch which was 40ml. Biopsy was taken to check for H. pylori. The scope was then passed through the anastomosis which was 14mm and had a diverticulum without obvious fistula present. The scope was then withdrawn. The patient tolerated the procedure well.      Physician Signature: Electronically signed by Dr. Aly Ochoa

## 2019-08-30 NOTE — ANESTHESIA PRE PROCEDURE
 SHOULDER SURGERY      UPPER GASTROINTESTINAL ENDOSCOPY  03/08/2019    UPPER GASTROINTESTINAL ENDOSCOPY N/A 3/8/2019    EGD BIOPSY performed by Aries Westbrook MD at [address] History:    Social History     Tobacco Use    Smoking status: Never Smoker    Smokeless tobacco: Never Used   Substance Use Topics    Alcohol use: No                                Counseling given: Not Answered      Vital Signs (Current): There were no vitals filed for this visit. BP Readings from Last 3 Encounters:   08/21/19 107/73   08/20/19 103/63   07/31/19 (!) 142/94       NPO Status:                                                                                 BMI:   Wt Readings from Last 3 Encounters:   08/29/19 286 lb (129.7 kg)   08/21/19 297 lb (134.7 kg)   07/31/19 (!) 305 lb (138.3 kg)     There is no height or weight on file to calculate BMI.    CBC:   Lab Results   Component Value Date    WBC 4.8 07/30/2019    RBC 5.76 07/30/2019    HGB 16.7 07/30/2019    HCT 51.2 07/30/2019    MCV 88.9 07/30/2019    RDW 15.0 07/30/2019     07/30/2019       CMP:   Lab Results   Component Value Date     07/30/2019    K 3.3 07/30/2019    K 3.7 04/11/2019     07/30/2019    CO2 23 07/30/2019    BUN 10 07/30/2019    CREATININE 1.1 07/30/2019    GFRAA >60 07/30/2019    LABGLOM >60 07/30/2019    GLUCOSE 101 07/30/2019    GLUCOSE 95 02/04/2011    PROT 7.2 07/30/2019    CALCIUM 8.9 07/30/2019    BILITOT 0.6 07/30/2019    ALKPHOS 97 07/30/2019    AST 16 07/30/2019    ALT 11 07/30/2019       POC Tests: No results for input(s): POCGLU, POCNA, POCK, POCCL, POCBUN, POCHEMO, POCHCT in the last 72 hours.     Coags:   Lab Results   Component Value Date    PROTIME 13.0 06/24/2019    INR 1.2 06/24/2019       HCG (If Applicable): No results found for: PREGTESTUR, PREGSERUM, HCG, HCGQUANT     ABGs: No results found for: PHART, PO2ART, CVU2PZK, QQU7RVU, BEART, Y5YGSSZS     Type

## 2019-09-04 ENCOUNTER — HOSPITAL ENCOUNTER (OUTPATIENT)
Dept: INFUSION THERAPY | Age: 64
Setting detail: INFUSION SERIES
Discharge: HOME | End: 2019-09-04
Payer: MEDICARE

## 2019-09-04 ENCOUNTER — TELEPHONE (OUTPATIENT)
Dept: BARIATRICS/WEIGHT MGMT | Age: 64
End: 2019-09-04

## 2019-09-04 VITALS
HEART RATE: 83 BPM | RESPIRATION RATE: 14 BRPM | TEMPERATURE: 98.3 F | SYSTOLIC BLOOD PRESSURE: 122 MMHG | DIASTOLIC BLOOD PRESSURE: 79 MMHG

## 2019-09-04 DIAGNOSIS — G43.A0 NON-INTRACTABLE CYCLICAL VOMITING, PRESENCE OF NAUSEA NOT SPECIFIED: Primary | ICD-10-CM

## 2019-09-04 DIAGNOSIS — E86.0 DEHYDRATION, MODERATE: ICD-10-CM

## 2019-09-04 DIAGNOSIS — E86.0 DEHYDRATION: Primary | ICD-10-CM

## 2019-09-04 DIAGNOSIS — G43.A0 CYCLICAL VOMITING WITH NAUSEA, INTRACTABILITY OF VOMITING NOT SPECIFIED: Primary | ICD-10-CM

## 2019-09-04 PROCEDURE — 2500000003 HC RX 250 WO HCPCS: Performed by: SURGERY

## 2019-09-04 PROCEDURE — 96360 HYDRATION IV INFUSION INIT: CPT

## 2019-09-04 PROCEDURE — 2580000003 HC RX 258: Performed by: SURGERY

## 2019-09-04 PROCEDURE — 2580000003 HC RX 258

## 2019-09-04 PROCEDURE — 96365 THER/PROPH/DIAG IV INF INIT: CPT

## 2019-09-04 RX ORDER — HEPARIN SODIUM (PORCINE) LOCK FLUSH IV SOLN 100 UNIT/ML 100 UNIT/ML
500 SOLUTION INTRAVENOUS PRN
Status: DISCONTINUED | OUTPATIENT
Start: 2019-09-04 | End: 2019-09-05 | Stop reason: HOSPADM

## 2019-09-04 RX ORDER — SODIUM CHLORIDE 9 MG/ML
INJECTION, SOLUTION INTRAVENOUS CONTINUOUS
Status: CANCELLED
Start: 2019-09-04

## 2019-09-04 RX ORDER — SODIUM CHLORIDE 9 MG/ML
INJECTION, SOLUTION INTRAVENOUS
Status: COMPLETED
Start: 2019-09-04 | End: 2019-09-04

## 2019-09-04 RX ORDER — SODIUM CHLORIDE 0.9 % (FLUSH) 0.9 %
10 SYRINGE (ML) INJECTION PRN
Status: CANCELLED | OUTPATIENT
Start: 2019-09-04

## 2019-09-04 RX ORDER — SODIUM CHLORIDE 9 MG/ML
INJECTION, SOLUTION INTRAVENOUS CONTINUOUS
Status: ACTIVE | OUTPATIENT
Start: 2019-09-04 | End: 2019-09-04

## 2019-09-04 RX ORDER — HEPARIN SODIUM (PORCINE) LOCK FLUSH IV SOLN 100 UNIT/ML 100 UNIT/ML
500 SOLUTION INTRAVENOUS PRN
Status: CANCELLED | OUTPATIENT
Start: 2019-09-04

## 2019-09-04 RX ORDER — SODIUM CHLORIDE 0.9 % (FLUSH) 0.9 %
10 SYRINGE (ML) INJECTION PRN
Status: DISCONTINUED | OUTPATIENT
Start: 2019-09-04 | End: 2019-09-05 | Stop reason: HOSPADM

## 2019-09-04 RX ADMIN — Medication 10 ML: at 13:34

## 2019-09-04 RX ADMIN — ASCORBIC ACID, VITAMIN A PALMITATE, CHOLECALCIFEROL, THIAMINE HYDROCHLORIDE, RIBOFLAVIN-5 PHOSPHATE SODIUM, PYRIDOXINE HYDROCHLORIDE, NIACINAMIDE, DEXPANTHENOL, ALPHA-TOCOPHEROL ACETATE, VITAMIN K1, FOLIC ACID, BIOTIN, CYANOCOBALAMIN: 200; 3300; 200; 6; 3.6; 6; 40; 15; 10; 150; 600; 60; 5 INJECTION, SOLUTION INTRAVENOUS at 14:25

## 2019-09-04 RX ADMIN — SODIUM CHLORIDE 1000 ML: 9 INJECTION, SOLUTION INTRAVENOUS at 13:34

## 2019-09-04 ASSESSMENT — PAIN SCALES - GENERAL: PAINLEVEL_OUTOF10: 0

## 2019-09-05 ENCOUNTER — HOSPITAL ENCOUNTER (OUTPATIENT)
Dept: GENERAL RADIOLOGY | Age: 64
Discharge: HOME | End: 2019-09-07
Payer: MEDICARE

## 2019-09-05 DIAGNOSIS — G43.A0 CYCLICAL VOMITING WITH NAUSEA, INTRACTABILITY OF VOMITING NOT SPECIFIED: ICD-10-CM

## 2019-09-05 PROCEDURE — 2500000003 HC RX 250 WO HCPCS: Performed by: SURGERY

## 2019-09-05 PROCEDURE — 74245 XR UGI & SMALL BOWEL: CPT

## 2019-09-05 RX ADMIN — BARIUM SULFATE 176 G: 960 POWDER, FOR SUSPENSION ORAL at 10:00

## 2019-09-05 RX ADMIN — BARIUM SULFATE 340 G: 980 POWDER, FOR SUSPENSION ORAL at 10:01

## 2019-09-06 ENCOUNTER — TELEPHONE (OUTPATIENT)
Dept: BARIATRICS/WEIGHT MGMT | Age: 64
End: 2019-09-06

## 2019-09-06 DIAGNOSIS — G43.A0 CYCLICAL VOMITING WITH NAUSEA, INTRACTABILITY OF VOMITING NOT SPECIFIED: Primary | ICD-10-CM

## 2019-09-06 RX ORDER — SODIUM CHLORIDE 9 MG/ML
INJECTION, SOLUTION INTRAVENOUS ONCE
Status: CANCELLED
Start: 2019-09-09

## 2019-09-06 RX ORDER — SODIUM CHLORIDE 9 MG/ML
INJECTION, SOLUTION INTRAVENOUS ONCE
Status: CANCELLED
Start: 2019-09-06

## 2019-09-09 ENCOUNTER — HOSPITAL ENCOUNTER (OUTPATIENT)
Dept: INFUSION THERAPY | Age: 64
Setting detail: INFUSION SERIES
Discharge: HOME | End: 2019-09-09
Payer: MEDICARE

## 2019-09-09 DIAGNOSIS — R11.15 NON-INTRACTABLE CYCLICAL VOMITING WITH NAUSEA: ICD-10-CM

## 2019-09-09 DIAGNOSIS — E86.0 DEHYDRATION: Primary | ICD-10-CM

## 2019-09-09 PROCEDURE — 2580000003 HC RX 258: Performed by: SURGERY

## 2019-09-09 PROCEDURE — 2580000003 HC RX 258: Performed by: INTERNAL MEDICINE

## 2019-09-09 PROCEDURE — 96361 HYDRATE IV INFUSION ADD-ON: CPT

## 2019-09-09 PROCEDURE — 96365 THER/PROPH/DIAG IV INF INIT: CPT

## 2019-09-09 PROCEDURE — 96360 HYDRATION IV INFUSION INIT: CPT

## 2019-09-09 PROCEDURE — 2500000003 HC RX 250 WO HCPCS: Performed by: INTERNAL MEDICINE

## 2019-09-09 RX ORDER — SODIUM CHLORIDE 0.9 % (FLUSH) 0.9 %
10 SYRINGE (ML) INJECTION PRN
Status: DISCONTINUED | OUTPATIENT
Start: 2019-09-09 | End: 2019-09-10 | Stop reason: HOSPADM

## 2019-09-09 RX ORDER — HEPARIN SODIUM (PORCINE) LOCK FLUSH IV SOLN 100 UNIT/ML 100 UNIT/ML
500 SOLUTION INTRAVENOUS PRN
Status: CANCELLED | OUTPATIENT
Start: 2019-09-09

## 2019-09-09 RX ORDER — SODIUM CHLORIDE 9 MG/ML
INJECTION, SOLUTION INTRAVENOUS ONCE
Status: CANCELLED
Start: 2019-09-09

## 2019-09-09 RX ORDER — SODIUM CHLORIDE 0.9 % (FLUSH) 0.9 %
10 SYRINGE (ML) INJECTION PRN
Status: CANCELLED | OUTPATIENT
Start: 2019-09-09

## 2019-09-09 RX ORDER — SODIUM CHLORIDE 9 MG/ML
INJECTION, SOLUTION INTRAVENOUS ONCE
Status: COMPLETED | OUTPATIENT
Start: 2019-09-09 | End: 2019-09-09

## 2019-09-09 RX ORDER — HEPARIN SODIUM (PORCINE) LOCK FLUSH IV SOLN 100 UNIT/ML 100 UNIT/ML
500 SOLUTION INTRAVENOUS PRN
Status: DISCONTINUED | OUTPATIENT
Start: 2019-09-09 | End: 2019-09-10 | Stop reason: HOSPADM

## 2019-09-09 RX ADMIN — ASCORBIC ACID, VITAMIN A PALMITATE, CHOLECALCIFEROL, THIAMINE HYDROCHLORIDE, RIBOFLAVIN-5 PHOSPHATE SODIUM, PYRIDOXINE HYDROCHLORIDE, NIACINAMIDE, DEXPANTHENOL, ALPHA-TOCOPHEROL ACETATE, VITAMIN K1, FOLIC ACID, BIOTIN, CYANOCOBALAMIN: 200; 3300; 200; 6; 3.6; 6; 40; 15; 10; 150; 600; 60; 5 INJECTION, SOLUTION INTRAVENOUS at 10:18

## 2019-09-09 RX ADMIN — Medication 10 ML: at 10:23

## 2019-09-09 RX ADMIN — SODIUM CHLORIDE: 9 INJECTION, SOLUTION INTRAVENOUS at 11:22

## 2019-09-11 ENCOUNTER — OFFICE VISIT (OUTPATIENT)
Dept: BARIATRICS/WEIGHT MGMT | Age: 64
End: 2019-09-11
Payer: MEDICARE

## 2019-09-11 VITALS
RESPIRATION RATE: 20 BRPM | BODY MASS INDEX: 34.94 KG/M2 | WEIGHT: 281 LBS | HEIGHT: 75 IN | DIASTOLIC BLOOD PRESSURE: 80 MMHG | HEART RATE: 73 BPM | TEMPERATURE: 96.8 F | SYSTOLIC BLOOD PRESSURE: 136 MMHG

## 2019-09-11 DIAGNOSIS — R11.15 INTRACTABLE CYCLICAL VOMITING WITH NAUSEA: Primary | ICD-10-CM

## 2019-09-11 PROCEDURE — 99024 POSTOP FOLLOW-UP VISIT: CPT | Performed by: SURGERY

## 2019-09-11 PROCEDURE — 99211 OFF/OP EST MAY X REQ PHY/QHP: CPT

## 2019-09-11 RX ORDER — AMOXICILLIN 875 MG/1
875 TABLET, COATED ORAL
Refills: 0 | COMMUNITY
Start: 2019-09-05 | End: 2019-10-09 | Stop reason: ALTCHOICE

## 2019-09-11 RX ORDER — FLUTICASONE PROPIONATE 50 MCG
SPRAY, SUSPENSION (ML) NASAL
Refills: 0 | COMMUNITY
Start: 2019-09-05 | End: 2020-06-10

## 2019-09-11 RX ORDER — ONDANSETRON HYDROCHLORIDE 8 MG/1
8 TABLET, FILM COATED ORAL EVERY 8 HOURS PRN
Qty: 30 TABLET | Refills: 1 | Status: ON HOLD
Start: 2019-09-11 | End: 2021-04-19 | Stop reason: ALTCHOICE

## 2019-09-11 NOTE — PROGRESS NOTES
Pt here for N/V. Pt states today was the first day he could eat without vomiting. Pt is talking an antibiotic right now. Water intake 16 oz. Bowels moving daily. Pt is not doing the protein or vitamins right now. Pt states he has been taking a built bar. Pt states he eats 2 a day. Pt can eat fruit without getting sick.

## 2019-09-11 NOTE — PROGRESS NOTES
Surgery Progress Note            Chief complaint:   Patient Active Problem List   Diagnosis    Gastroesophageal reflux disease without esophagitis    Chest pain    S/P gastric bypass    ABENA (obstructive sleep apnea)    High cholesterol    Atrial fibrillation (HCC)    Dehydration    Non-intractable cyclical vomiting with nausea       S: states he is doing better but only today, now tolerating diet but again only today so far. O:   Vitals:    09/11/19 1400   BP: 136/80   Pulse: 73   Resp: 20   Temp: 96.8 °F (36 °C)     No intake or output data in the 24 hours ending 09/11/19 1427        Labs:  No results for input(s): WBC, HGB, HCT in the last 72 hours. Invalid input(s): PLR  Lab Results   Component Value Date    CREATININE 1.1 07/30/2019    BUN 10 07/30/2019     07/30/2019    K 3.3 (L) 07/30/2019     07/30/2019    CO2 23 07/30/2019     No results for input(s): LIPASE, AMYLASE in the last 72 hours.     Physical exam:   /80 (Site: Left Upper Arm, Position: Sitting, Cuff Size: Medium Adult)   Pulse 73   Temp 96.8 °F (36 °C) (Temporal)   Resp 20   Ht 6' 3\" (1.905 m)   Wt 281 lb (127.5 kg)   BMI 35.12 kg/m²   General appearance: NAD  Head: NCAT  Neck: supple, no masses  Lungs: equal chest rise bilateral  Heart: S1S2 present  Abdomen: soft, nontender, nondistended  Skin; no lesions  Gu: no cva tenderness  Extremities: extremities normal, atraumatic, no cyanosis or edema    A:  Nausea and vomiting s/p rygb with abnormal    P: cont current tx and increase liquid intake as tolerated    Cecy Clark MD  9/11/2019

## 2019-09-26 ENCOUNTER — TELEPHONE (OUTPATIENT)
Dept: BARIATRICS/WEIGHT MGMT | Age: 64
End: 2019-09-26

## 2019-09-26 DIAGNOSIS — R11.15 INTRACTABLE CYCLICAL VOMITING WITH NAUSEA: Primary | ICD-10-CM

## 2019-09-26 RX ORDER — SODIUM CHLORIDE 9 MG/ML
INJECTION, SOLUTION INTRAVENOUS ONCE
Status: CANCELLED
Start: 2019-09-26

## 2019-09-27 ENCOUNTER — HOSPITAL ENCOUNTER (OUTPATIENT)
Dept: INFUSION THERAPY | Age: 64
Setting detail: INFUSION SERIES
Discharge: HOME | End: 2019-09-27
Payer: MEDICARE

## 2019-09-27 DIAGNOSIS — E86.0 DEHYDRATION: Primary | ICD-10-CM

## 2019-09-27 DIAGNOSIS — R11.15 INTRACTABLE CYCLICAL VOMITING WITH NAUSEA: ICD-10-CM

## 2019-09-27 PROCEDURE — 2500000003 HC RX 250 WO HCPCS: Performed by: SURGERY

## 2019-09-27 PROCEDURE — 2580000003 HC RX 258: Performed by: SURGERY

## 2019-09-27 PROCEDURE — 96365 THER/PROPH/DIAG IV INF INIT: CPT

## 2019-09-27 PROCEDURE — 96361 HYDRATE IV INFUSION ADD-ON: CPT

## 2019-09-27 RX ORDER — SODIUM CHLORIDE 9 MG/ML
INJECTION, SOLUTION INTRAVENOUS ONCE
Status: CANCELLED
Start: 2019-09-27

## 2019-09-27 RX ORDER — SODIUM CHLORIDE 0.9 % (FLUSH) 0.9 %
10 SYRINGE (ML) INJECTION PRN
Status: CANCELLED | OUTPATIENT
Start: 2019-09-27

## 2019-09-27 RX ORDER — HEPARIN SODIUM (PORCINE) LOCK FLUSH IV SOLN 100 UNIT/ML 100 UNIT/ML
500 SOLUTION INTRAVENOUS PRN
Status: CANCELLED | OUTPATIENT
Start: 2019-09-27

## 2019-09-27 RX ORDER — SODIUM CHLORIDE 0.9 % (FLUSH) 0.9 %
10 SYRINGE (ML) INJECTION PRN
Status: DISCONTINUED | OUTPATIENT
Start: 2019-09-27 | End: 2019-09-28 | Stop reason: HOSPADM

## 2019-09-27 RX ORDER — HEPARIN SODIUM (PORCINE) LOCK FLUSH IV SOLN 100 UNIT/ML 100 UNIT/ML
500 SOLUTION INTRAVENOUS PRN
Status: DISCONTINUED | OUTPATIENT
Start: 2019-09-27 | End: 2019-09-28 | Stop reason: HOSPADM

## 2019-09-27 RX ORDER — SODIUM CHLORIDE 9 MG/ML
INJECTION, SOLUTION INTRAVENOUS ONCE
Status: COMPLETED | OUTPATIENT
Start: 2019-09-27 | End: 2019-09-27

## 2019-09-27 RX ADMIN — Medication 10 ML: at 15:36

## 2019-09-27 RX ADMIN — SODIUM CHLORIDE: 9 INJECTION, SOLUTION INTRAVENOUS at 13:26

## 2019-09-27 RX ADMIN — ASCORBIC ACID, VITAMIN A PALMITATE, CHOLECALCIFEROL, THIAMINE HYDROCHLORIDE, RIBOFLAVIN-5 PHOSPHATE SODIUM, PYRIDOXINE HYDROCHLORIDE, NIACINAMIDE, DEXPANTHENOL, ALPHA-TOCOPHEROL ACETATE, VITAMIN K1, FOLIC ACID, BIOTIN, CYANOCOBALAMIN: 200; 3300; 200; 6; 3.6; 6; 40; 15; 10; 150; 600; 60; 5 INJECTION, SOLUTION INTRAVENOUS at 14:28

## 2019-09-27 RX ADMIN — Medication 10 ML: at 13:26

## 2019-10-07 ENCOUNTER — HOSPITAL ENCOUNTER (OUTPATIENT)
Age: 64
Discharge: HOME | End: 2019-10-09
Payer: MEDICARE

## 2019-10-07 LAB
ALBUMIN SERPL-MCNC: 3.9 G/DL (ref 3.5–5.2)
ALP BLD-CCNC: 85 U/L (ref 40–129)
ALT SERPL-CCNC: 12 U/L (ref 0–40)
ANION GAP SERPL CALCULATED.3IONS-SCNC: 19 MMOL/L (ref 7–16)
AST SERPL-CCNC: 20 U/L (ref 0–39)
BILIRUB SERPL-MCNC: 0.7 MG/DL (ref 0–1.2)
BUN BLDV-MCNC: 16 MG/DL (ref 8–23)
CALCIUM SERPL-MCNC: 9.3 MG/DL (ref 8.6–10.2)
CHLORIDE BLD-SCNC: 105 MMOL/L (ref 98–107)
CHOLESTEROL, TOTAL: 178 MG/DL (ref 0–199)
CO2: 22 MMOL/L (ref 22–29)
CREAT SERPL-MCNC: 1.3 MG/DL (ref 0.7–1.2)
FERRITIN: 134 NG/ML
FOLATE: 5.6 NG/ML (ref 4.8–24.2)
GFR AFRICAN AMERICAN: >60
GFR NON-AFRICAN AMERICAN: 55 ML/MIN/1.73
GLUCOSE BLD-MCNC: 86 MG/DL (ref 74–99)
HCT VFR BLD CALC: 40.9 % (ref 37–54)
HEMOGLOBIN: 13 G/DL (ref 12.5–16.5)
MAGNESIUM: 1.9 MG/DL (ref 1.6–2.6)
MCH RBC QN AUTO: 29.7 PG (ref 26–35)
MCHC RBC AUTO-ENTMCNC: 31.8 % (ref 32–34.5)
MCV RBC AUTO: 93.4 FL (ref 80–99.9)
PDW BLD-RTO: 15 FL (ref 11.5–15)
PHOSPHORUS: 3 MG/DL (ref 2.5–4.5)
PLATELET # BLD: 262 E9/L (ref 130–450)
PMV BLD AUTO: 10.5 FL (ref 7–12)
POTASSIUM SERPL-SCNC: 3.4 MMOL/L (ref 3.5–5)
PREALBUMIN: 19 MG/DL (ref 20–40)
RBC # BLD: 4.38 E12/L (ref 3.8–5.8)
SODIUM BLD-SCNC: 146 MMOL/L (ref 132–146)
TOTAL PROTEIN: 7.4 G/DL (ref 6.4–8.3)
TRIGL SERPL-MCNC: 124 MG/DL (ref 0–149)
VITAMIN B-12: 623 PG/ML (ref 211–946)
VITAMIN D 25-HYDROXY: 62 NG/ML (ref 30–100)
WBC # BLD: 3.8 E9/L (ref 4.5–11.5)

## 2019-10-07 PROCEDURE — 80053 COMPREHEN METABOLIC PANEL: CPT

## 2019-10-07 PROCEDURE — 82306 VITAMIN D 25 HYDROXY: CPT

## 2019-10-07 PROCEDURE — 84134 ASSAY OF PREALBUMIN: CPT

## 2019-10-07 PROCEDURE — 36415 COLL VENOUS BLD VENIPUNCTURE: CPT

## 2019-10-07 PROCEDURE — 82746 ASSAY OF FOLIC ACID SERUM: CPT

## 2019-10-07 PROCEDURE — 82607 VITAMIN B-12: CPT

## 2019-10-07 PROCEDURE — 85027 COMPLETE CBC AUTOMATED: CPT

## 2019-10-07 PROCEDURE — 84425 ASSAY OF VITAMIN B-1: CPT

## 2019-10-07 PROCEDURE — 84100 ASSAY OF PHOSPHORUS: CPT

## 2019-10-07 PROCEDURE — 83735 ASSAY OF MAGNESIUM: CPT

## 2019-10-07 PROCEDURE — 84478 ASSAY OF TRIGLYCERIDES: CPT

## 2019-10-07 PROCEDURE — 82728 ASSAY OF FERRITIN: CPT

## 2019-10-07 PROCEDURE — 84630 ASSAY OF ZINC: CPT

## 2019-10-07 PROCEDURE — 82465 ASSAY BLD/SERUM CHOLESTEROL: CPT

## 2019-10-09 ENCOUNTER — TELEPHONE (OUTPATIENT)
Dept: BARIATRICS/WEIGHT MGMT | Age: 64
End: 2019-10-09

## 2019-10-09 ENCOUNTER — OFFICE VISIT (OUTPATIENT)
Dept: BARIATRICS/WEIGHT MGMT | Age: 64
End: 2019-10-09
Payer: MEDICARE

## 2019-10-09 VITALS
HEART RATE: 55 BPM | HEIGHT: 75 IN | BODY MASS INDEX: 33.2 KG/M2 | SYSTOLIC BLOOD PRESSURE: 119 MMHG | DIASTOLIC BLOOD PRESSURE: 79 MMHG | WEIGHT: 267 LBS | TEMPERATURE: 97.3 F | RESPIRATION RATE: 20 BRPM

## 2019-10-09 DIAGNOSIS — R11.15 INTRACTABLE CYCLICAL VOMITING WITH NAUSEA: Primary | ICD-10-CM

## 2019-10-09 DIAGNOSIS — K91.2 MALNUTRITION FOLLOWING GASTROINTESTINAL SURGERY: Primary | ICD-10-CM

## 2019-10-09 DIAGNOSIS — E86.0 DEHYDRATION: ICD-10-CM

## 2019-10-09 PROCEDURE — 99212 OFFICE O/P EST SF 10 MIN: CPT

## 2019-10-09 PROCEDURE — 99213 OFFICE O/P EST LOW 20 MIN: CPT | Performed by: SURGERY

## 2019-10-09 RX ORDER — HEPARIN SODIUM (PORCINE) LOCK FLUSH IV SOLN 100 UNIT/ML 100 UNIT/ML
500 SOLUTION INTRAVENOUS PRN
Status: CANCELLED | OUTPATIENT
Start: 2019-10-14

## 2019-10-09 RX ORDER — SODIUM CHLORIDE 0.9 % (FLUSH) 0.9 %
10 SYRINGE (ML) INJECTION PRN
Status: CANCELLED | OUTPATIENT
Start: 2019-10-14

## 2019-10-09 RX ORDER — SODIUM CHLORIDE 9 MG/ML
INJECTION, SOLUTION INTRAVENOUS ONCE
Status: CANCELLED
Start: 2019-10-14

## 2019-10-12 LAB — ZINC: 54.9 UG/DL (ref 60–120)

## 2019-10-13 LAB — VITAMIN B1 WHOLE BLOOD: 66 NMOL/L (ref 70–180)

## 2019-10-15 ENCOUNTER — HOSPITAL ENCOUNTER (OUTPATIENT)
Dept: INFUSION THERAPY | Age: 64
Setting detail: INFUSION SERIES
Discharge: HOME | End: 2019-10-15
Payer: MEDICARE

## 2019-10-15 DIAGNOSIS — E86.0 DEHYDRATION: Primary | ICD-10-CM

## 2019-10-15 PROCEDURE — 96365 THER/PROPH/DIAG IV INF INIT: CPT

## 2019-10-15 PROCEDURE — 96360 HYDRATION IV INFUSION INIT: CPT

## 2019-10-15 PROCEDURE — 96361 HYDRATE IV INFUSION ADD-ON: CPT

## 2019-10-15 PROCEDURE — 2500000003 HC RX 250 WO HCPCS: Performed by: SURGERY

## 2019-10-15 PROCEDURE — 2580000003 HC RX 258: Performed by: SURGERY

## 2019-10-15 RX ORDER — SODIUM CHLORIDE 0.9 % (FLUSH) 0.9 %
10 SYRINGE (ML) INJECTION PRN
Status: DISCONTINUED | OUTPATIENT
Start: 2019-10-15 | End: 2019-10-16 | Stop reason: HOSPADM

## 2019-10-15 RX ORDER — SODIUM CHLORIDE 9 MG/ML
INJECTION, SOLUTION INTRAVENOUS ONCE
Status: COMPLETED | OUTPATIENT
Start: 2019-10-15 | End: 2019-10-15

## 2019-10-15 RX ORDER — SODIUM CHLORIDE 9 MG/ML
INJECTION, SOLUTION INTRAVENOUS ONCE
Status: CANCELLED
Start: 2019-10-15

## 2019-10-15 RX ORDER — SODIUM CHLORIDE 0.9 % (FLUSH) 0.9 %
10 SYRINGE (ML) INJECTION PRN
Status: CANCELLED | OUTPATIENT
Start: 2019-10-15

## 2019-10-15 RX ORDER — HEPARIN SODIUM (PORCINE) LOCK FLUSH IV SOLN 100 UNIT/ML 100 UNIT/ML
500 SOLUTION INTRAVENOUS PRN
Status: CANCELLED | OUTPATIENT
Start: 2019-10-15

## 2019-10-15 RX ORDER — HEPARIN SODIUM (PORCINE) LOCK FLUSH IV SOLN 100 UNIT/ML 100 UNIT/ML
500 SOLUTION INTRAVENOUS PRN
Status: DISCONTINUED | OUTPATIENT
Start: 2019-10-15 | End: 2019-10-16 | Stop reason: HOSPADM

## 2019-10-15 RX ADMIN — Medication 10 ML: at 12:38

## 2019-10-15 RX ADMIN — ASCORBIC ACID, VITAMIN A PALMITATE, CHOLECALCIFEROL, THIAMINE HYDROCHLORIDE, RIBOFLAVIN-5 PHOSPHATE SODIUM, PYRIDOXINE HYDROCHLORIDE, NIACINAMIDE, DEXPANTHENOL, ALPHA-TOCOPHEROL ACETATE, VITAMIN K1, FOLIC ACID, BIOTIN, CYANOCOBALAMIN: 200; 3300; 200; 6; 3.6; 6; 40; 15; 10; 150; 600; 60; 5 INJECTION, SOLUTION INTRAVENOUS at 13:36

## 2019-10-15 RX ADMIN — SODIUM CHLORIDE: 9 INJECTION, SOLUTION INTRAVENOUS at 12:35

## 2019-10-17 ENCOUNTER — TELEPHONE (OUTPATIENT)
Dept: BARIATRICS/WEIGHT MGMT | Age: 64
End: 2019-10-17

## 2019-11-06 ENCOUNTER — TELEPHONE (OUTPATIENT)
Dept: BARIATRICS/WEIGHT MGMT | Age: 64
End: 2019-11-06

## 2020-01-20 ENCOUNTER — HOSPITAL ENCOUNTER (OUTPATIENT)
Age: 65
Discharge: HOME | End: 2020-01-22
Payer: MEDICARE

## 2020-01-20 LAB
ALBUMIN SERPL-MCNC: 3.4 G/DL (ref 3.5–5.2)
ALP BLD-CCNC: 78 U/L (ref 40–129)
ALT SERPL-CCNC: 15 U/L (ref 0–40)
ANION GAP SERPL CALCULATED.3IONS-SCNC: 16 MMOL/L (ref 7–16)
AST SERPL-CCNC: 19 U/L (ref 0–39)
BILIRUB SERPL-MCNC: 0.5 MG/DL (ref 0–1.2)
BUN BLDV-MCNC: 11 MG/DL (ref 8–23)
CALCIUM SERPL-MCNC: 8.8 MG/DL (ref 8.6–10.2)
CHLORIDE BLD-SCNC: 108 MMOL/L (ref 98–107)
CHOLESTEROL, TOTAL: 136 MG/DL (ref 0–199)
CO2: 22 MMOL/L (ref 22–29)
CREAT SERPL-MCNC: 1.2 MG/DL (ref 0.7–1.2)
FERRITIN: 169 NG/ML
FOLATE: 7.1 NG/ML (ref 4.8–24.2)
GFR AFRICAN AMERICAN: >60
GFR NON-AFRICAN AMERICAN: >60 ML/MIN/1.73
GLUCOSE BLD-MCNC: 92 MG/DL (ref 74–99)
HCT VFR BLD CALC: 40 % (ref 37–54)
HEMOGLOBIN: 12.7 G/DL (ref 12.5–16.5)
MAGNESIUM: 1.8 MG/DL (ref 1.6–2.6)
MCH RBC QN AUTO: 28.9 PG (ref 26–35)
MCHC RBC AUTO-ENTMCNC: 31.8 % (ref 32–34.5)
MCV RBC AUTO: 91.1 FL (ref 80–99.9)
PDW BLD-RTO: 16.2 FL (ref 11.5–15)
PHOSPHORUS: 2.8 MG/DL (ref 2.5–4.5)
PLATELET # BLD: 273 E9/L (ref 130–450)
PMV BLD AUTO: 10.2 FL (ref 7–12)
POTASSIUM SERPL-SCNC: 2.9 MMOL/L (ref 3.5–5)
PREALBUMIN: 18 MG/DL (ref 20–40)
RBC # BLD: 4.39 E12/L (ref 3.8–5.8)
SODIUM BLD-SCNC: 146 MMOL/L (ref 132–146)
TOTAL PROTEIN: 6.8 G/DL (ref 6.4–8.3)
TRIGL SERPL-MCNC: 97 MG/DL (ref 0–149)
VITAMIN B-12: 336 PG/ML (ref 211–946)
VITAMIN D 25-HYDROXY: 63 NG/ML (ref 30–100)
WBC # BLD: 3.4 E9/L (ref 4.5–11.5)

## 2020-01-20 PROCEDURE — 84100 ASSAY OF PHOSPHORUS: CPT

## 2020-01-20 PROCEDURE — 82465 ASSAY BLD/SERUM CHOLESTEROL: CPT

## 2020-01-20 PROCEDURE — 84425 ASSAY OF VITAMIN B-1: CPT

## 2020-01-20 PROCEDURE — 84630 ASSAY OF ZINC: CPT

## 2020-01-20 PROCEDURE — 84134 ASSAY OF PREALBUMIN: CPT

## 2020-01-20 PROCEDURE — 36415 COLL VENOUS BLD VENIPUNCTURE: CPT

## 2020-01-20 PROCEDURE — 82746 ASSAY OF FOLIC ACID SERUM: CPT

## 2020-01-20 PROCEDURE — 82728 ASSAY OF FERRITIN: CPT

## 2020-01-20 PROCEDURE — 82306 VITAMIN D 25 HYDROXY: CPT

## 2020-01-20 PROCEDURE — 83735 ASSAY OF MAGNESIUM: CPT

## 2020-01-20 PROCEDURE — 80053 COMPREHEN METABOLIC PANEL: CPT

## 2020-01-20 PROCEDURE — 85027 COMPLETE CBC AUTOMATED: CPT

## 2020-01-20 PROCEDURE — 84478 ASSAY OF TRIGLYCERIDES: CPT

## 2020-01-20 PROCEDURE — 82607 VITAMIN B-12: CPT

## 2020-01-23 LAB — ZINC: 54.8 UG/DL (ref 60–120)

## 2020-01-26 LAB — VITAMIN B1 WHOLE BLOOD: 76 NMOL/L (ref 70–180)

## 2020-01-29 ENCOUNTER — INITIAL CONSULT (OUTPATIENT)
Dept: BARIATRICS/WEIGHT MGMT | Age: 65
End: 2020-01-29
Payer: MEDICARE

## 2020-01-29 ENCOUNTER — OFFICE VISIT (OUTPATIENT)
Dept: BARIATRICS/WEIGHT MGMT | Age: 65
End: 2020-01-29
Payer: MEDICARE

## 2020-01-29 ENCOUNTER — TELEPHONE (OUTPATIENT)
Dept: BARIATRICS/WEIGHT MGMT | Age: 65
End: 2020-01-29

## 2020-01-29 VITALS
HEIGHT: 75 IN | TEMPERATURE: 97.3 F | RESPIRATION RATE: 20 BRPM | WEIGHT: 216 LBS | DIASTOLIC BLOOD PRESSURE: 78 MMHG | SYSTOLIC BLOOD PRESSURE: 125 MMHG | BODY MASS INDEX: 26.86 KG/M2 | HEART RATE: 52 BPM

## 2020-01-29 PROCEDURE — 99999 PR OFFICE/OUTPT VISIT,PROCEDURE ONLY: CPT | Performed by: DIETITIAN, REGISTERED

## 2020-01-29 PROCEDURE — 99212 OFFICE O/P EST SF 10 MIN: CPT

## 2020-01-29 PROCEDURE — 99213 OFFICE O/P EST LOW 20 MIN: CPT | Performed by: SURGERY

## 2020-01-29 PROCEDURE — 96360 HYDRATION IV INFUSION INIT: CPT | Performed by: SURGERY

## 2020-01-29 NOTE — PATIENT INSTRUCTIONS
Please continue to take your vitamin and mineral supplements as instructed. If you received a blood work prescription today for laboratory monitoring due prior to your next routine follow-up visit, please have this blood work obtained 10 to 14 days prior to your next visit. It is important to fast for 12 hours prior to routine weight loss surgery blood work, EXCEPT for drinking water, to ensure accuracy of results. Please report nausea, vomiting, abdominal pain, or any other problems you experience to your surgeon. For problems related to weight loss surgery, it is best to go to [address] [place] [address] Emergency Department and have your surgeon paged.

## 2020-01-29 NOTE — TELEPHONE ENCOUNTER
Spoke with [facility] to clarify Potassium dose. Per order of Dr. Audi Jarvis dose to be Micro K 10  meq twice daily. Dispense 60- 0- refills.

## 2020-01-29 NOTE — PROGRESS NOTES
Simeon Alemankristen Reid  1/29/2020  Laparoscopic Rafael-en- Y Gastric Bypass  6 months Post-Operative Follow-up. Subjective:   Eri Keane is a 71 y.o. male six months post Laparoscopic Rafael-en-Y Gastric Bypass. No issues  He is not having swallowing difficulty, is compliant most of the time with the multivitamins and calcium + Vit D. He is meeting fluid recommendations of at least 64 ounces per day and is meeting protein recommendations. Exercise: no regular exercise. Weight=216 lb (98 kg)  Today's weight represents a weight loss to date of 119 pounds. Allergies: Atorvastatin calcium and Lactose   Prior to Admission medications    Medication Sig Start Date End Date Taking? Authorizing Provider   Potassium 75 MG TABS Take 1 tablet by mouth 2 times daily 1/29/20  Yes Bennie Lewis MD   fluticasone Glennette Crew) 50 MCG/ACT nasal spray SHAKE LQ AND U 2 SPRAYS IEN HS 9/5/19  Yes Historical Provider, MD   ondansetron (ZOFRAN) 8 MG tablet Take 1 tablet by mouth every 8 hours as needed for Nausea or Vomiting 9/11/19  Yes Bennie Lewis MD   CIPRODEX 0.3-0.1 % otic suspension SHAKE LQ AND INT 3 GTS IN AU BID FOR 5 DAYS PRN 7/2/19  Yes Historical Provider, MD   aspirin 81 MG tablet Take 81 mg by mouth daily   Yes Historical Provider, MD   Calcium Carbonate (CALCI-CHEW PO) Take 3 tablets by mouth daily Indications: Bar Adv   Yes Historical Provider, MD   Cholecalciferol (VITAMIN D3) 5000 units TABS Take 5,000 Units by mouth daily   Yes Historical Provider, MD   IRON PO Take 1,500 mg by mouth daily    Yes Historical Provider, MD   omeprazole (PRILOSEC) 20 MG delayed release capsule Take 1 capsule by mouth Daily 6/5/19 6/4/20 Yes Bennie Lewis MD   Multiple Vitamin (MULTI VITAMIN MENS PO) Take 2 tablets by mouth daily Indications:  Bar Adv    Yes Historical Provider, MD   levothyroxine (SYNTHROID) 75 MCG tablet Take 75 mcg by mouth daily 10/2/17  Yes Historical Provider, MD   dofetilide (TIKOSYN) 500 MCG capsule Take 500 mg by mouth 2 times daily 1/19/18  Yes Historical Provider, MD   rivaroxaban (XARELTO) 20 MG TABS tablet Take 20 mg by mouth daily Last dose 5/29/2019 7/3/17  Yes Historical Provider, MD   carvedilol (COREG) 6.25 MG tablet Take 6.25 mg by mouth 2 times daily 5/15/17  Yes Historical Provider, MD   ranitidine (RANITIDINE 150 MAX STRENGTH) 150 MG tablet Take 150 mg by mouth daily as needed for Heartburn    Yes Historical Provider, MD           Physical exam:   /78 (Site: Left Upper Arm, Position: Sitting, Cuff Size: Large Adult)   Pulse 52   Temp 97.3 °F (36.3 °C) (Temporal)   Resp 20   Ht 6' 3\" (1.905 m)   Wt 216 lb (98 kg)   BMI 27.00 kg/m²   General appearance: alert, appears stated age and cooperative  Head: Normocephalic, without obvious abnormality, atraumatic  Neck: no adenopathy, no carotid bruit, no JVD, supple, symmetrical, trachea midline and thyroid not enlarged, symmetric, no tenderness/mass/nodules  Lungs: clear to auscultation bilaterally  Heart: regular rate and rhythm  Abdomen: soft, non-tender; bowel sounds normal; no masses,  no organomegaly  Extremities: extremities normal, atraumatic, no cyanosis or edema    Assessment:  6 months post Laparoscopic Rafael-en- Y Gastric Bypass. He does not complain of GERD,  does not have sleep apnea,  does not have diabetes,  does not have hypertension off medical treatment. Cholesterol and triglycerides are normal. Low potassium    Plan:  Supplement K . Continue to eat small portions very slowly and chew well before swallowing. High protein, low calorie diet. Drink plenty of water. Try to exercise more, maintain adequate variety and balance. Follow up in 6 months. Always call the clinic if you have any medical problems. Stop the Omeprazole or other acid reducing medicine if possible. If ulcer pain or acid reflux symptoms start, restart the Omeprazole and call the clinic.       Physician Signature: Electronically signed by Dr. Aj Souza MD

## 2020-01-30 ENCOUNTER — TELEPHONE (OUTPATIENT)
Dept: BARIATRICS/WEIGHT MGMT | Age: 65
End: 2020-01-30

## 2020-01-30 RX ORDER — SODIUM CHLORIDE 0.9 % (FLUSH) 0.9 %
5 SYRINGE (ML) INJECTION PRN
Status: CANCELLED | OUTPATIENT
Start: 2020-02-04

## 2020-01-30 RX ORDER — SODIUM CHLORIDE 0.9 % (FLUSH) 0.9 %
10 SYRINGE (ML) INJECTION PRN
Status: CANCELLED | OUTPATIENT
Start: 2020-02-04

## 2020-01-30 RX ORDER — SODIUM CHLORIDE 9 MG/ML
INJECTION, SOLUTION INTRAVENOUS ONCE
Status: CANCELLED
Start: 2020-02-04

## 2020-01-30 RX ORDER — HEPARIN SODIUM (PORCINE) LOCK FLUSH IV SOLN 100 UNIT/ML 100 UNIT/ML
500 SOLUTION INTRAVENOUS PRN
Status: CANCELLED | OUTPATIENT
Start: 2020-02-04

## 2020-01-30 NOTE — TELEPHONE ENCOUNTER
Pr order of Dr Charlotte Isabel, iv hydration ordered with mvi. Order faxed to infusion center to be ordered.

## 2020-01-31 RX ORDER — CYANOCOBALAMIN 1000 UG/ML
1000 INJECTION INTRAMUSCULAR; SUBCUTANEOUS ONCE
Qty: 1 ML | Refills: 0 | Status: SHIPPED | OUTPATIENT
Start: 2020-01-31 | End: 2020-07-15 | Stop reason: SDUPTHER

## 2020-02-04 ENCOUNTER — TELEPHONE (OUTPATIENT)
Dept: BARIATRICS/WEIGHT MGMT | Age: 65
End: 2020-02-04

## 2020-02-05 ENCOUNTER — TELEPHONE (OUTPATIENT)
Dept: BARIATRICS/WEIGHT MGMT | Age: 65
End: 2020-02-05

## 2020-02-05 NOTE — TELEPHONE ENCOUNTER
Pt left without being seen for tx of a Zinc Def. Pt also has low potassium along with low zinc and has bene non-compliant since the day he had sx. Per Dr. Becca Mathur pt needs to be seen by Dr. Cecille Rangel. Lee Mcdonald called the pt. Lee Mcdonald reviewed the above information with the pt. Pt is in agreement to see Dr. Cecille Rangel. Lee Mcdonald reviewed that Rachel Méndez MA would give the pt a call tomm. to schedule him. Pt verbalized understanding.

## 2020-02-07 ENCOUNTER — OFFICE VISIT (OUTPATIENT)
Dept: BARIATRICS/WEIGHT MGMT | Age: 65
End: 2020-02-07
Payer: MEDICARE

## 2020-02-07 ENCOUNTER — HOSPITAL ENCOUNTER (OUTPATIENT)
Age: 65
Discharge: HOME | End: 2020-02-09
Payer: MEDICARE

## 2020-02-07 LAB
ALBUMIN SERPL-MCNC: 3.8 G/DL (ref 3.5–5.2)
ALP BLD-CCNC: 83 U/L (ref 40–129)
ALT SERPL-CCNC: 19 U/L (ref 0–40)
ANION GAP SERPL CALCULATED.3IONS-SCNC: 13 MMOL/L (ref 7–16)
AST SERPL-CCNC: 23 U/L (ref 0–39)
BILIRUB SERPL-MCNC: 0.5 MG/DL (ref 0–1.2)
BUN BLDV-MCNC: 15 MG/DL (ref 8–23)
CALCIUM SERPL-MCNC: 9.2 MG/DL (ref 8.6–10.2)
CHLORIDE BLD-SCNC: 106 MMOL/L (ref 98–107)
CO2: 24 MMOL/L (ref 22–29)
CREAT SERPL-MCNC: 1.2 MG/DL (ref 0.7–1.2)
GFR AFRICAN AMERICAN: >60
GFR NON-AFRICAN AMERICAN: >60 ML/MIN/1.73
GLUCOSE BLD-MCNC: 89 MG/DL (ref 74–99)
MAGNESIUM: 2 MG/DL (ref 1.6–2.6)
POTASSIUM SERPL-SCNC: 3.6 MMOL/L (ref 3.5–5)
SODIUM BLD-SCNC: 143 MMOL/L (ref 132–146)
TOTAL PROTEIN: 7.2 G/DL (ref 6.4–8.3)
TSH SERPL DL<=0.05 MIU/L-ACNC: 1.56 UIU/ML (ref 0.27–4.2)

## 2020-02-07 PROCEDURE — 83735 ASSAY OF MAGNESIUM: CPT

## 2020-02-07 PROCEDURE — 80053 COMPREHEN METABOLIC PANEL: CPT

## 2020-02-07 PROCEDURE — 36415 COLL VENOUS BLD VENIPUNCTURE: CPT

## 2020-02-07 PROCEDURE — 99212 OFFICE O/P EST SF 10 MIN: CPT

## 2020-02-07 PROCEDURE — 84443 ASSAY THYROID STIM HORMONE: CPT

## 2020-02-07 PROCEDURE — 99201 PR OFFICE OUTPATIENT NEW 10 MINUTES: CPT | Performed by: INTERNAL MEDICINE

## 2020-02-07 NOTE — TELEPHONE ENCOUNTER
Patient called in and never needed IV infusion. Orders put in mistakenly. Per order of Dr. Cj Souza patient to get Vit B12 IM in infusion center. Orders corrected and faxed to infusion center.

## 2020-02-07 NOTE — PATIENT INSTRUCTIONS
Zinc sulfate 50mg one tablet every Mon, Wed and Fri for 6 weeks     Repeat CMP now along with Mg     Repeat TSH now            Return to see me in 8 weeks          Repeat Zinc level 2 weeks prior to appt with me

## 2020-02-08 ENCOUNTER — TELEPHONE (OUTPATIENT)
Dept: ENDOCRINOLOGY | Age: 65
End: 2020-02-08

## 2020-02-08 NOTE — TELEPHONE ENCOUNTER
Spoke with pt by phone. K is now 3.6 2/7/20, was 2.9. TSH 1.560   Plan:  No therapy warranted.   Follow through with plan for zinc deficiency  Consider lowering dose of LT4 at next visit  02/08/20    Naomy Parkinson

## 2020-02-11 PROBLEM — E53.8 VITAMIN B12 DEFICIENCY: Status: ACTIVE | Noted: 2020-02-11

## 2020-02-11 RX ORDER — CYANOCOBALAMIN 1000 UG/ML
1000 INJECTION INTRAMUSCULAR; SUBCUTANEOUS ONCE
Status: CANCELLED
Start: 2020-02-12

## 2020-02-17 ENCOUNTER — HOSPITAL ENCOUNTER (OUTPATIENT)
Dept: INFUSION THERAPY | Age: 65
Setting detail: INFUSION SERIES
Discharge: HOME | End: 2020-02-17
Payer: MEDICARE

## 2020-02-17 VITALS
OXYGEN SATURATION: 97 % | TEMPERATURE: 98 F | HEART RATE: 60 BPM | DIASTOLIC BLOOD PRESSURE: 79 MMHG | SYSTOLIC BLOOD PRESSURE: 130 MMHG | RESPIRATION RATE: 20 BRPM

## 2020-02-17 DIAGNOSIS — E53.8 VITAMIN B12 DEFICIENCY: Primary | ICD-10-CM

## 2020-02-17 PROCEDURE — 6360000002 HC RX W HCPCS: Performed by: SURGERY

## 2020-02-17 PROCEDURE — 96372 THER/PROPH/DIAG INJ SC/IM: CPT

## 2020-02-17 RX ORDER — CYANOCOBALAMIN 1000 UG/ML
1000 INJECTION INTRAMUSCULAR; SUBCUTANEOUS ONCE
Status: CANCELLED
Start: 2020-02-17

## 2020-02-17 RX ORDER — CYANOCOBALAMIN 1000 UG/ML
1000 INJECTION INTRAMUSCULAR; SUBCUTANEOUS ONCE
Status: COMPLETED
Start: 2020-02-17 | End: 2020-02-17

## 2020-02-17 RX ADMIN — CYANOCOBALAMIN 1000 MCG: 1000 INJECTION, SOLUTION INTRAMUSCULAR at 08:59

## 2020-04-13 ENCOUNTER — TELEPHONE (OUTPATIENT)
Dept: BARIATRICS/WEIGHT MGMT | Age: 65
End: 2020-04-13

## 2020-06-08 ENCOUNTER — TELEPHONE (OUTPATIENT)
Dept: BARIATRICS/WEIGHT MGMT | Age: 65
End: 2020-06-08

## 2020-06-09 ENCOUNTER — HOSPITAL ENCOUNTER (OUTPATIENT)
Age: 65
Discharge: HOME | End: 2020-06-11
Payer: MEDICARE

## 2020-06-09 LAB
HCT VFR BLD CALC: 36.5 % (ref 37–54)
HEMOGLOBIN: 11.7 G/DL (ref 12.5–16.5)
MCH RBC QN AUTO: 30.6 PG (ref 26–35)
MCHC RBC AUTO-ENTMCNC: 32.1 % (ref 32–34.5)
MCV RBC AUTO: 95.5 FL (ref 80–99.9)
PDW BLD-RTO: 14.9 FL (ref 11.5–15)
PLATELET # BLD: 274 E9/L (ref 130–450)
PMV BLD AUTO: 9.6 FL (ref 7–12)
RBC # BLD: 3.82 E12/L (ref 3.8–5.8)
WBC # BLD: 3.4 E9/L (ref 4.5–11.5)

## 2020-06-09 PROCEDURE — 84255 ASSAY OF SELENIUM: CPT

## 2020-06-09 PROCEDURE — 36415 COLL VENOUS BLD VENIPUNCTURE: CPT

## 2020-06-09 PROCEDURE — 82525 ASSAY OF COPPER: CPT

## 2020-06-09 PROCEDURE — 82728 ASSAY OF FERRITIN: CPT

## 2020-06-09 PROCEDURE — 84630 ASSAY OF ZINC: CPT

## 2020-06-09 PROCEDURE — 82306 VITAMIN D 25 HYDROXY: CPT

## 2020-06-09 PROCEDURE — 80053 COMPREHEN METABOLIC PANEL: CPT

## 2020-06-09 PROCEDURE — 82746 ASSAY OF FOLIC ACID SERUM: CPT

## 2020-06-09 PROCEDURE — 82607 VITAMIN B-12: CPT

## 2020-06-09 PROCEDURE — 84478 ASSAY OF TRIGLYCERIDES: CPT

## 2020-06-09 PROCEDURE — 85027 COMPLETE CBC AUTOMATED: CPT

## 2020-06-09 PROCEDURE — 84134 ASSAY OF PREALBUMIN: CPT

## 2020-06-09 PROCEDURE — 82465 ASSAY BLD/SERUM CHOLESTEROL: CPT

## 2020-06-10 ENCOUNTER — OFFICE VISIT (OUTPATIENT)
Dept: BARIATRICS/WEIGHT MGMT | Age: 65
End: 2020-06-10
Payer: MEDICARE

## 2020-06-10 ENCOUNTER — INITIAL CONSULT (OUTPATIENT)
Dept: BARIATRICS/WEIGHT MGMT | Age: 65
End: 2020-06-10
Payer: MEDICARE

## 2020-06-10 ENCOUNTER — TELEPHONE (OUTPATIENT)
Dept: BARIATRICS/WEIGHT MGMT | Age: 65
End: 2020-06-10

## 2020-06-10 VITALS
DIASTOLIC BLOOD PRESSURE: 62 MMHG | TEMPERATURE: 97.9 F | WEIGHT: 171 LBS | BODY MASS INDEX: 21.26 KG/M2 | RESPIRATION RATE: 20 BRPM | SYSTOLIC BLOOD PRESSURE: 102 MMHG | HEART RATE: 48 BPM | HEIGHT: 75 IN

## 2020-06-10 VITALS — HEIGHT: 75 IN | BODY MASS INDEX: 21.26 KG/M2 | WEIGHT: 171 LBS

## 2020-06-10 LAB
ALBUMIN SERPL-MCNC: 4 G/DL (ref 3.5–5.2)
ALP BLD-CCNC: 76 U/L (ref 40–129)
ALT SERPL-CCNC: 59 U/L (ref 0–40)
ANION GAP SERPL CALCULATED.3IONS-SCNC: 14 MMOL/L (ref 7–16)
AST SERPL-CCNC: 54 U/L (ref 0–39)
BILIRUB SERPL-MCNC: 0.4 MG/DL (ref 0–1.2)
BUN BLDV-MCNC: 16 MG/DL (ref 8–23)
CALCIUM SERPL-MCNC: 9.1 MG/DL (ref 8.6–10.2)
CHLORIDE BLD-SCNC: 107 MMOL/L (ref 98–107)
CHOLESTEROL, TOTAL: 161 MG/DL (ref 0–199)
CO2: 21 MMOL/L (ref 22–29)
CREAT SERPL-MCNC: 1.2 MG/DL (ref 0.7–1.2)
FERRITIN: 242 NG/ML
FOLATE: 16.1 NG/ML (ref 4.8–24.2)
GFR AFRICAN AMERICAN: >60
GFR NON-AFRICAN AMERICAN: >60 ML/MIN/1.73
GLUCOSE BLD-MCNC: 84 MG/DL (ref 74–99)
POTASSIUM SERPL-SCNC: 3.8 MMOL/L (ref 3.5–5)
PREALBUMIN: 19 MG/DL (ref 20–40)
SODIUM BLD-SCNC: 142 MMOL/L (ref 132–146)
TOTAL PROTEIN: 7.6 G/DL (ref 6.4–8.3)
TRIGL SERPL-MCNC: 79 MG/DL (ref 0–149)
VITAMIN B-12: 257 PG/ML (ref 211–946)
VITAMIN D 25-HYDROXY: 76 NG/ML (ref 30–100)

## 2020-06-10 PROCEDURE — 99212 OFFICE O/P EST SF 10 MIN: CPT

## 2020-06-10 PROCEDURE — 99999 PR OFFICE/OUTPT VISIT,PROCEDURE ONLY: CPT

## 2020-06-10 PROCEDURE — 99214 OFFICE O/P EST MOD 30 MIN: CPT | Performed by: SURGERY

## 2020-06-10 RX ORDER — ASCORBIC ACID 500 MG
1000 TABLET ORAL DAILY
Status: ON HOLD | COMMUNITY
End: 2021-04-19 | Stop reason: ALTCHOICE

## 2020-06-10 RX ORDER — ONDANSETRON 4 MG/1
4 TABLET, FILM COATED ORAL 3 TIMES DAILY PRN
Qty: 30 TABLET | Refills: 2 | Status: ON HOLD
Start: 2020-06-10 | End: 2021-04-19 | Stop reason: ALTCHOICE

## 2020-06-10 NOTE — PROGRESS NOTES
Medical Nutrition Therapy (MNT) Assessment     Pt. Name: Ritu Smalls   Date:6/10/2020  F/U Appt: Sx Type     Excess wt loss at nearly 1 year post op, needs higher protein intake    Food Records Kept: No  24Hour Recall Completed at Office:No    PHONE APPOINTMENT DUE TO UJCLY-21 public health emergency. All printed materials mailed to pt. Ht 6' 3\" (1.905 m)   Wt 171 lb (77.6 kg)   BMI 21.37 kg/m²  Height: 6' 3\" (1.905 m) Weight: 171 lb (77.6 kg)   IBW:ideal body weight   200 % EBWL: 121%     Wt Readings from Last 3 Encounters:   06/10/20 171 lb (77.6 kg)   06/10/20 171 lb (77.6 kg)   01/29/20 216 lb (98 kg)                     Protein supplements: Pt. is currently using the following protein supplement none and consuming the following grams of protein - 40-50 grams (estimated). Rd / Ld reviewed with patient based on 1.2 gram per kg of IBW patient needs 109-119 grams of protein total daily. Subjective:                   Current MNT:       Bariatric soft diet introducing raw fruit, raw vegetables, rice, protein bars, multivitamin, calcium, protein supplements     MNT Advanced to:     Bariatric soft diet introducing raw fruit, raw vegetables, rice, protein bars, multivitamin, calcium, protein supplements      Allergies and Food Allergies and Food Intolerances:   Not able to tolerate milk or lactose well, all red meat, chicken, breakfast cereals and protein supplements.     Nutritional Data  Yes - Poor appetite more than 5 days     No - NPO or clear liquid more than 3 days     No - Problem Chewing      No - Problem Swallowing      No - Problem Mouth Pain      No - Problem Denture  No - Missing Teeth         No - Pressure Sore    No - Open Wound    No - Surgical Wound  No - Documented: Sepsis or Infection    Yes - Nausea  Yes - Vomiting    SW Bowel Protocol:  Patient states he / she has the following bowel movements per week  6-7  yes,  - Diarrhea  No - Steatorrhea  No - Constipation  When was your last bowel movement   How much plain water are you drinking daily 100 oz plus/day  What other beverages / fluids are you drinking daily and the amount coffee - 3-4 cups  No - Are you taking Colace daily  What amount of Colace are you taking daily n/a  No - Are you taking Sugar Free Chewable Fiber Gummies  What amount of Sugar Free Chewable Fiber Gummies are you taking daily n/a  No Patient was provided today the Constipation Handout  Yes Rd Ld reinforced pt needs to consume the following - Water Intake should be 64 oz plus, Fiber Chews prn, Dietary Fiber Intake 25 - 35 grams /day        No - Hair loss   No - Weight regain       No - Acid Reflux  Yes - Dumping Syndrome  Not often     No - Food gets stuck  Yes - Are you eating solids - should not be eating solids until 6 weeks post-op. no - Night Time Coughing  not applicable -  B Ping Noted  Yes - Are you chewing thoroughly  - Does not take effect until 6 weeks post-op  No - Are you hungry after eating     How many meals a day 3 / Portions Sizes of Meals are 4 oz         Labs: 6/9/20:  ALT 59H, AST 54H, Hgb 11.7L, Hct 36.5L, prealbumin 19L    Supplements: Pt takes a bariatric MVI w/ iron - 4-6 tablets daily, vitamin D 5,000 iu daily, vitamin C 1000 mg daily and zinc   Pt does not take calcium    Estimated Daily Nutritional Needs: Based on Bariatric procedure for Wt. Loss  Energy: Will be calculated at Maintenance Stage    Protein: 60 - 80 gms Daily    MNT Plan and Additional Education: RD/LD reviewed Bariatric Soft Diet incorporating in Raw Fruits, Raw Vegetables, Red Meat, and Rice. Encouraged pt to meet protein and fluid needs daily. Handouts given. Pt. verbalized understanding. Pt instructed to call with questions.       MEDICAL NUTRITION THERAPY (MNT) - Nutrition Care Plan   (The patient has been educated and given written education material that reinforces the following dietary guidelines for Bariatric Surgery)  Goal:  Patient able to verbalize the following dietary concepts:  3 to 4 ounce portions per meal     6 small meals daily      60 to 80 grams of protein   48 to 64 ounces of fluid daily (water)     Always consume protein item first with all meals   Pt is aware wt loss is pt controlled. Slow Meals - 30-35 chews per bite / Meals 30 - 45 minutes long            The RD / LD reviewed with the patient that the dietary goals of the bariatric patient is to ensure that patient is able to meet established nutritional needs for a Bariatric Surgery  Patient at this time in order to promote healing, prevent significant weight changes, prevent skin breakdown and abnormal lab values, prevent complications, and tolerance to diet and texture of foods. Pt is able to identify proper food choices and needed changes and is able to explain proper food preparation. RD / LD reviewed compliance with assigned diet stages, volume of food and drink consumed, timing of meals, amount of protein and energy intake, daily vitamin and mineral supplementation, identify food cravings and any adverse reactions associated with intake of food and drink. RD / LD uses behavioral tools such as goal setting, MI, and self-monitoring, to reinforce the anatomic and physiologic effects of the surgery, so that the described behaviors become more of a habit not simply a reaction to the altered anatomy. Care Plan:   · Rd/Ld Addressed Food Records or 24-Hour Recall  · Rd / Ld encouraged patient to exercise at least 30 minutes daily  · Rd / Ld instructed patient on how to increase oral protein intake within the diet. Pt. can verbalize he / she will need to consume 60 - 80 grams. · Rd / Ld instructed the patient on how to increase the use of protein supplements within the diet. · Pt. was instructed on how to increase water intake. Patient will need to consume 48 - 64 oz. of just plain water in addition to 30 oz. of non-caloric beverages.   · Handouts given to several times for pt and his wife to choose from. Pt must meet protein needs - see above. Pt must continue to meet water needs. Pt is to avoid drinking with meals, as per bariatric diet plan. The importance of eating was stressed, pt is enjoying more energy and is working hard, but, not taking the time to eat and not getting enough protein foods. Pt can eat some foods that are high in fat and does not dump. Pt can eat these as he needs calories, however, the goal and focus is always protein foods first and other foods secondary. Pt is to follow the bariatric diet first and foremost and is not to forget the elements of the diet. All questions answered and pt agreed to the above plan and voiced understanding.

## 2020-06-12 LAB
SELENIUM: 168.2 UG/L (ref 23–190)
ZINC: 76.9 UG/DL (ref 60–120)

## 2020-06-13 LAB — COPPER: 53.4 UG/DL (ref 70–140)

## 2020-06-16 ENCOUNTER — HOSPITAL ENCOUNTER (OUTPATIENT)
Dept: CT IMAGING | Age: 65
Discharge: HOME | End: 2020-06-16
Payer: MEDICARE

## 2020-06-16 PROCEDURE — 6360000004 HC RX CONTRAST MEDICATION: Performed by: RADIOLOGY

## 2020-06-16 PROCEDURE — 74177 CT ABD & PELVIS W/CONTRAST: CPT

## 2020-06-16 RX ADMIN — IOPAMIDOL 75 ML: 755 INJECTION, SOLUTION INTRAVENOUS at 11:12

## 2020-06-16 RX ADMIN — IOHEXOL 50 ML: 240 INJECTION, SOLUTION INTRATHECAL; INTRAVASCULAR; INTRAVENOUS; ORAL at 11:12

## 2020-06-18 ENCOUNTER — TELEPHONE (OUTPATIENT)
Dept: BARIATRICS/WEIGHT MGMT | Age: 65
End: 2020-06-18

## 2020-06-18 RX ORDER — ONDANSETRON 4 MG/1
TABLET, ORALLY DISINTEGRATING ORAL
Qty: 30 TABLET | Refills: 1 | Status: ON HOLD
Start: 2020-06-18 | End: 2021-04-19 | Stop reason: ALTCHOICE

## 2020-06-19 ENCOUNTER — TELEPHONE (OUTPATIENT)
Dept: BARIATRICS/WEIGHT MGMT | Age: 65
End: 2020-06-19

## 2020-07-15 ENCOUNTER — TELEPHONE (OUTPATIENT)
Dept: BARIATRICS/WEIGHT MGMT | Age: 65
End: 2020-07-15

## 2020-07-15 RX ORDER — CYANOCOBALAMIN 1000 UG/ML
1000 INJECTION INTRAMUSCULAR; SUBCUTANEOUS ONCE
Qty: 1 ML | Refills: 0 | Status: ON HOLD | OUTPATIENT
Start: 2020-07-15 | End: 2021-04-19 | Stop reason: ALTCHOICE

## 2020-07-15 NOTE — TELEPHONE ENCOUNTER
Per order of Dr. Rhodia Mohs patient to go to infusion center at [address] [facility] [facility],[address] for IM B12 injection. Orders placed and faxed to [facility]. Marlena infusion.

## 2020-07-17 RX ORDER — CYANOCOBALAMIN 1000 UG/ML
1000 INJECTION INTRAMUSCULAR; SUBCUTANEOUS ONCE
Status: CANCELLED
Start: 2020-07-17

## 2020-07-21 ENCOUNTER — HOSPITAL ENCOUNTER (OUTPATIENT)
Dept: INFUSION THERAPY | Age: 65
Setting detail: INFUSION SERIES
Discharge: HOME | End: 2020-07-21
Payer: MEDICARE

## 2020-07-21 VITALS
OXYGEN SATURATION: 96 % | SYSTOLIC BLOOD PRESSURE: 88 MMHG | TEMPERATURE: 98 F | HEART RATE: 50 BPM | DIASTOLIC BLOOD PRESSURE: 54 MMHG | RESPIRATION RATE: 14 BRPM

## 2020-07-21 DIAGNOSIS — E53.8 VITAMIN B12 DEFICIENCY: Primary | ICD-10-CM

## 2020-07-21 PROCEDURE — 6360000002 HC RX W HCPCS: Performed by: SURGERY

## 2020-07-21 PROCEDURE — 96372 THER/PROPH/DIAG INJ SC/IM: CPT

## 2020-07-21 RX ORDER — CYANOCOBALAMIN 1000 UG/ML
1000 INJECTION INTRAMUSCULAR; SUBCUTANEOUS ONCE
Status: COMPLETED
Start: 2020-07-21 | End: 2020-07-21

## 2020-07-21 RX ORDER — CYANOCOBALAMIN 1000 UG/ML
1000 INJECTION INTRAMUSCULAR; SUBCUTANEOUS ONCE
Status: CANCELLED
Start: 2020-07-21

## 2020-07-21 RX ADMIN — CYANOCOBALAMIN 1000 MCG: 1000 INJECTION, SOLUTION INTRAMUSCULAR at 09:59

## 2020-07-23 ENCOUNTER — INITIAL CONSULT (OUTPATIENT)
Dept: SURGERY | Age: 65
End: 2020-07-23
Payer: MEDICARE

## 2020-07-23 VITALS
TEMPERATURE: 97.8 F | WEIGHT: 168 LBS | OXYGEN SATURATION: 95 % | HEIGHT: 75 IN | BODY MASS INDEX: 20.89 KG/M2 | SYSTOLIC BLOOD PRESSURE: 107 MMHG | DIASTOLIC BLOOD PRESSURE: 70 MMHG | HEART RATE: 56 BPM

## 2020-07-23 PROCEDURE — 99214 OFFICE O/P EST MOD 30 MIN: CPT | Performed by: SURGERY

## 2020-07-23 NOTE — PROGRESS NOTES
General Surgery History and Physical    Patient's Name/Date of Birth: Penney Meigs / 1955    Date: July 23, 2020     Surgeon: zAucena Holland M.D.    PCP: Tish Moffett     Chief Complaint: incisional hernia    HPI:   Penney Meigs is a 71 y.o. male who presents for evaluation of incisional hernia. It has become larger and more painful recently and the pt wants repair.     Past Medical History:   Diagnosis Date    Arthritis     Atrial fibrillation (HCC)     Back pain     CPAP (continuous positive airway pressure) dependence     setting 15    Difficulty swallowing     DJD (degenerative joint disease)     GERD (gastroesophageal reflux disease)     High cholesterol     Hx of cardiovascular stress test 04/12/2019    Lexiscan stress test    Knee pain     Morbid obesity due to excess calories ([address]     Non-stress test nonreactive     2011    ABENA (obstructive sleep apnea) 6/25/2019    PONV (postoperative nausea and vomiting)     Problems with hearing     Sleep apnea     SOBOE (shortness of breath on exertion)     Thyroid disease        Past Surgical History:   Procedure Laterality Date    APPENDECTOMY      BACK SURGERY      laminectomy     CARDIOVERSION      2 years ago dr [facility]    CHOLECYSTECTOMY      COLONOSCOPY      COLONOSCOPY  05/31/2019    COLONOSCOPY N/A 5/31/2019    COLONOSCOPY performed by Lissette Gibbons MD at [address] [facility] ARTHROSCOPY Bilateral 1980's    KNEE SURGERY      both knees- scopes rt x2 left x 1    KARINA-EN-Y GASTRIC BYPASS N/A 6/24/2019    GASTRIC BYPASS KARINA-EN-Y LAPAROSCOPIC performed by Lissette Gibbons MD at [address] ENDOSCOPY  03/08/2019    UPPER GASTROINTESTINAL ENDOSCOPY N/A 3/8/2019    EGD BIOPSY performed by Lissette Gibbons MD at [address] 8/30/2019    EGD BIOPSY performed by Lissette Gibbons MD at [address] Medications   Medication Sig Dispense Refill    ondansetron (ZOFRAN ODT) 4 MG disintegrating tablet disintegrating tabs. 30 tablet 1    vitamin C (ASCORBIC ACID) 500 MG tablet Take 1,000 mg by mouth daily      zinc 50 MG CAPS Take one capsule every Mon, Wed and Fri for six weeks 18 capsule 0    Potassium 75 MG TABS Take 1 tablet by mouth 2 times daily 450 tablet 0    Calcium Carbonate (CALCI-CHEW PO) Take 3 tablets by mouth daily Indications: Bar Adv      Cholecalciferol (VITAMIN D3) 5000 units TABS Take 5,000 Units by mouth daily      IRON PO Take 1,500 mg by mouth daily       Multiple Vitamin (MULTI VITAMIN MENS PO) Take 2 tablets by mouth daily Indications: Bar Adv       levothyroxine (SYNTHROID) 75 MCG tablet Take 75 mcg by mouth daily      dofetilide (TIKOSYN) 500 MCG capsule Take 500 mg by mouth 2 times daily      rivaroxaban (XARELTO) 20 MG TABS tablet Take 20 mg by mouth daily Last dose 5/29/2019      cyanocobalamin 1000 MCG/ML injection Inject 1 mL into the muscle once for 1 dose 1 mL 0    ondansetron (ZOFRAN) 4 MG tablet Take 1 tablet by mouth 3 times daily as needed for Nausea or Vomiting (Patient not taking: Reported on 7/23/2020) 30 tablet 2    ondansetron (ZOFRAN) 8 MG tablet Take 1 tablet by mouth every 8 hours as needed for Nausea or Vomiting (Patient not taking: Reported on 7/23/2020) 30 tablet 1    aspirin 81 MG tablet Take 81 mg by mouth daily      omeprazole (PRILOSEC) 20 MG delayed release capsule Take 1 capsule by mouth Daily (Patient not taking: Reported on 6/10/2020) 30 capsule 12    carvedilol (COREG) 6.25 MG tablet Take 6.25 mg by mouth 2 times daily      ranitidine (RANITIDINE 150 MAX STRENGTH) 150 MG tablet Take 150 mg by mouth daily as needed for Heartburn        No current facility-administered medications for this visit.         Allergies   Allergen Reactions    Atorvastatin Calcium Other (See Comments)     myalgias    Lactose Diarrhea       The patient has a family history that is negative for severe cardiovascular or respiratory issues, negative for reaction to anesthesia.     Social History     Socioeconomic History    Marital status: Life Partner     Spouse name: Not on file    Number of children: Not on file    Years of education: Not on file    Highest education level: Not on file   Occupational History    Not on file   Social Needs    Financial resource strain: Not on file    Food insecurity     Worry: Not on file     Inability: Not on file    Transportation needs     Medical: Not on file     Non-medical: Not on file   Tobacco Use    Smoking status: Never Smoker    Smokeless tobacco: Never Used   Substance and Sexual Activity    Alcohol use: No    Drug use: No    Sexual activity: Yes   Lifestyle    Physical activity     Days per week: Not on file     Minutes per session: Not on file    Stress: Not on file   Relationships    Social connections     Talks on phone: Not on file     Gets together: Not on file     Attends [employer] service: Not on file     Active member of club or organization: Not on file     Attends meetings of clubs or organizations: Not on file     Relationship status: Not on file    Intimate partner violence     Fear of current or ex partner: Not on file     Emotionally abused: Not on file     Physically abused: Not on file     Forced sexual activity: Not on file   Other Topics Concern    Not on file   Social History Narrative    Not on file           Review of Systems  Review of Systems -  General ROS: negative for - chills, fatigue or malaise  ENT ROS: negative for - hearing change, nasal congestion or nasal discharge  Allergy and Immunology ROS: negative for - hives, itchy/watery eyes or nasal congestion  Hematological and Lymphatic ROS: negative for - blood clots, blood transfusions, bruising or fatigue  Endocrine ROS: negative for - malaise/lethargy, mood swings, palpitations or polydipsia/polyuria  Respiratory ROS: negative for - sputum changes, stridor, tachypnea or wheezing  Cardiovascular ROS: negative for - irregular heartbeat, loss of consciousness, murmur or orthopnea  Gastrointestinal ROS: negative for - constipation, diarrhea, gas/bloating, heartburn or hematemesis  Genito-Urinary ROS: negative for -  genital discharge, genital ulcers or hematuria  Musculoskeletal ROS: negative for - gait disturbance, muscle pain or muscular weakness    Physical exam:   /70   Pulse 56   Temp 97.8 °F (36.6 °C) (Temporal)   Ht 6' 3\" (1.905 m)   Wt 168 lb (76.2 kg)   SpO2 95%   BMI 21.00 kg/m²   General appearance:  NAD  Psycho/social: negative for tremor or hallucinations  Head: NCAT, PERRLA, EOMI, red conjunctiva  Neck: supple, no masses  Lungs: CTAB, equal chest rise bilateral  Heart: Reg rate  Abdomen: soft, nontender, nondistended, incisional hernia at epigastrium and 0.5 cm in size, reducible  Skin; no lesions  Gu: no cva tenderness  Extremities: extremities normal, atraumatic, no cyanosis or edema        Assessment:  71 y.o. male with incisional hernia    Plan:  We discussed and agreed on watchful waiting and explained signs and symptoms of incarceration/strangulation and he understands and will come to ED if they present        Gabe Shelton MD  10:45 AM  7/23/2020

## 2020-08-24 PROBLEM — E53.8 VITAMIN B12 DEFICIENCY (NON ANEMIC): Status: ACTIVE | Noted: 2020-08-24

## 2020-08-24 RX ORDER — CYANOCOBALAMIN 1000 UG/ML
1000 INJECTION INTRAMUSCULAR; SUBCUTANEOUS ONCE
Status: CANCELLED
Start: 2020-08-24

## 2020-08-26 ENCOUNTER — HOSPITAL ENCOUNTER (OUTPATIENT)
Dept: INFUSION THERAPY | Age: 65
Setting detail: INFUSION SERIES
Discharge: HOME | End: 2020-08-26
Payer: MEDICARE

## 2020-08-26 VITALS
TEMPERATURE: 97.4 F | DIASTOLIC BLOOD PRESSURE: 63 MMHG | SYSTOLIC BLOOD PRESSURE: 120 MMHG | RESPIRATION RATE: 22 BRPM | OXYGEN SATURATION: 97 % | HEART RATE: 80 BPM

## 2020-08-26 DIAGNOSIS — E53.8 VITAMIN B12 DEFICIENCY: Primary | ICD-10-CM

## 2020-08-26 PROCEDURE — 96372 THER/PROPH/DIAG INJ SC/IM: CPT

## 2020-08-26 PROCEDURE — 6360000002 HC RX W HCPCS: Performed by: SURGERY

## 2020-08-26 RX ORDER — CYANOCOBALAMIN 1000 UG/ML
1000 INJECTION INTRAMUSCULAR; SUBCUTANEOUS ONCE
Status: COMPLETED
Start: 2020-08-26 | End: 2020-08-26

## 2020-08-26 RX ORDER — CYANOCOBALAMIN 1000 UG/ML
1000 INJECTION INTRAMUSCULAR; SUBCUTANEOUS ONCE
Status: CANCELLED
Start: 2020-08-26

## 2020-08-26 RX ADMIN — CYANOCOBALAMIN 1000 MCG: 1000 INJECTION, SOLUTION INTRAMUSCULAR at 09:27

## 2020-09-14 ENCOUNTER — TELEPHONE (OUTPATIENT)
Dept: BARIATRICS/WEIGHT MGMT | Age: 65
End: 2020-09-14

## 2020-09-14 RX ORDER — CYANOCOBALAMIN 1000 UG/ML
1000 INJECTION INTRAMUSCULAR; SUBCUTANEOUS ONCE
Status: CANCELLED
Start: 2020-09-23

## 2020-09-14 NOTE — TELEPHONE ENCOUNTER
Per order of Dr. Newberry [facility] patient needs set up for monthly B12 shots. Orders placed and faxed to infusion center at [address] [place] [address].

## 2020-09-23 ENCOUNTER — HOSPITAL ENCOUNTER (OUTPATIENT)
Dept: INFUSION THERAPY | Age: 65
Setting detail: INFUSION SERIES
Discharge: HOME | End: 2020-09-23
Payer: MEDICARE

## 2020-09-23 DIAGNOSIS — E53.8 VITAMIN B12 DEFICIENCY: Primary | ICD-10-CM

## 2020-09-23 PROCEDURE — 96372 THER/PROPH/DIAG INJ SC/IM: CPT

## 2020-09-23 PROCEDURE — 6360000002 HC RX W HCPCS: Performed by: SURGERY

## 2020-09-23 RX ORDER — CYANOCOBALAMIN 1000 UG/ML
1000 INJECTION INTRAMUSCULAR; SUBCUTANEOUS ONCE
Status: CANCELLED
Start: 2020-10-21

## 2020-09-23 RX ORDER — CYANOCOBALAMIN 1000 UG/ML
1000 INJECTION INTRAMUSCULAR; SUBCUTANEOUS ONCE
Status: COMPLETED
Start: 2020-09-23 | End: 2020-09-23

## 2020-09-23 RX ADMIN — CYANOCOBALAMIN 1000 MCG: 1000 INJECTION, SOLUTION INTRAMUSCULAR at 09:45

## 2020-10-21 ENCOUNTER — HOSPITAL ENCOUNTER (OUTPATIENT)
Dept: INFUSION THERAPY | Age: 65
Setting detail: INFUSION SERIES
Discharge: HOME | End: 2020-10-21
Payer: MEDICARE

## 2020-10-21 VITALS
HEART RATE: 50 BPM | DIASTOLIC BLOOD PRESSURE: 69 MMHG | TEMPERATURE: 98 F | SYSTOLIC BLOOD PRESSURE: 118 MMHG | RESPIRATION RATE: 24 BRPM | OXYGEN SATURATION: 99 %

## 2020-10-21 DIAGNOSIS — E53.8 VITAMIN B12 DEFICIENCY: Primary | ICD-10-CM

## 2020-10-21 PROCEDURE — 6360000002 HC RX W HCPCS: Performed by: SURGERY

## 2020-10-21 PROCEDURE — 96372 THER/PROPH/DIAG INJ SC/IM: CPT

## 2020-10-21 RX ORDER — CYANOCOBALAMIN 1000 UG/ML
1000 INJECTION INTRAMUSCULAR; SUBCUTANEOUS ONCE
Status: CANCELLED
Start: 2020-11-18

## 2020-10-21 RX ORDER — CYANOCOBALAMIN 1000 UG/ML
1000 INJECTION INTRAMUSCULAR; SUBCUTANEOUS ONCE
Status: COMPLETED
Start: 2020-10-21 | End: 2020-10-21

## 2020-10-21 RX ADMIN — CYANOCOBALAMIN 1000 MCG: 1000 INJECTION, SOLUTION INTRAMUSCULAR at 09:07

## 2020-11-23 ENCOUNTER — HOSPITAL ENCOUNTER (OUTPATIENT)
Dept: INFUSION THERAPY | Age: 65
Setting detail: INFUSION SERIES
Discharge: HOME | End: 2020-11-23
Payer: MEDICARE

## 2020-11-23 DIAGNOSIS — E53.8 VITAMIN B12 DEFICIENCY: Primary | ICD-10-CM

## 2020-11-23 PROCEDURE — 96372 THER/PROPH/DIAG INJ SC/IM: CPT

## 2020-11-23 PROCEDURE — 6360000002 HC RX W HCPCS: Performed by: SURGERY

## 2020-11-23 RX ORDER — CYANOCOBALAMIN 1000 UG/ML
1000 INJECTION INTRAMUSCULAR; SUBCUTANEOUS ONCE
Status: COMPLETED
Start: 2020-11-23 | End: 2020-11-23

## 2020-11-23 RX ORDER — CYANOCOBALAMIN 1000 UG/ML
1000 INJECTION INTRAMUSCULAR; SUBCUTANEOUS ONCE
Status: CANCELLED
Start: 2020-12-14

## 2020-11-23 RX ORDER — CYANOCOBALAMIN 1000 UG/ML
1000 INJECTION INTRAMUSCULAR; SUBCUTANEOUS ONCE
Status: CANCELLED
Start: 2020-11-23

## 2020-11-23 RX ADMIN — CYANOCOBALAMIN 1000 MCG: 1000 INJECTION, SOLUTION INTRAMUSCULAR at 12:07

## 2020-12-21 ENCOUNTER — HOSPITAL ENCOUNTER (OUTPATIENT)
Dept: INFUSION THERAPY | Age: 65
Setting detail: INFUSION SERIES
Discharge: HOME | End: 2020-12-21
Payer: MEDICARE

## 2020-12-21 VITALS — TEMPERATURE: 97.1 F

## 2020-12-21 DIAGNOSIS — E53.8 VITAMIN B12 DEFICIENCY: Primary | ICD-10-CM

## 2020-12-21 PROCEDURE — 6360000002 HC RX W HCPCS: Performed by: SURGERY

## 2020-12-21 PROCEDURE — 96372 THER/PROPH/DIAG INJ SC/IM: CPT

## 2020-12-21 RX ORDER — CYANOCOBALAMIN 1000 UG/ML
1000 INJECTION INTRAMUSCULAR; SUBCUTANEOUS ONCE
Status: CANCELLED
Start: 2021-01-18

## 2020-12-21 RX ORDER — CYANOCOBALAMIN 1000 UG/ML
1000 INJECTION INTRAMUSCULAR; SUBCUTANEOUS ONCE
Status: COMPLETED
Start: 2020-12-21 | End: 2020-12-21

## 2020-12-21 RX ADMIN — CYANOCOBALAMIN 1000 MCG: 1000 INJECTION, SOLUTION INTRAMUSCULAR; SUBCUTANEOUS at 10:59

## 2021-01-25 ENCOUNTER — HOSPITAL ENCOUNTER (OUTPATIENT)
Dept: INFUSION THERAPY | Age: 66
Setting detail: INFUSION SERIES
Discharge: HOME | End: 2021-01-25
Payer: MEDICARE

## 2021-01-25 DIAGNOSIS — E53.8 VITAMIN B12 DEFICIENCY: Primary | ICD-10-CM

## 2021-01-25 RX ORDER — CYANOCOBALAMIN 1000 UG/ML
1000 INJECTION INTRAMUSCULAR; SUBCUTANEOUS ONCE
Status: DISCONTINUED
Start: 2021-01-25 | End: 2021-01-28 | Stop reason: HOSPADM

## 2021-01-25 RX ORDER — CYANOCOBALAMIN 1000 UG/ML
1000 INJECTION INTRAMUSCULAR; SUBCUTANEOUS ONCE
Status: CANCELLED
Start: 2021-02-15

## 2021-03-03 DIAGNOSIS — E53.8 VITAMIN B12 DEFICIENCY: Primary | ICD-10-CM

## 2021-03-03 RX ORDER — CYANOCOBALAMIN 1000 UG/ML
1000 INJECTION INTRAMUSCULAR; SUBCUTANEOUS ONCE
Status: CANCELLED
Start: 2021-03-04

## 2021-03-18 ENCOUNTER — HOSPITAL ENCOUNTER (OUTPATIENT)
Dept: INFUSION THERAPY | Age: 66
Setting detail: INFUSION SERIES
Discharge: HOME | End: 2021-03-18
Payer: MEDICARE

## 2021-03-18 VITALS
HEART RATE: 60 BPM | DIASTOLIC BLOOD PRESSURE: 74 MMHG | SYSTOLIC BLOOD PRESSURE: 115 MMHG | OXYGEN SATURATION: 100 % | RESPIRATION RATE: 24 BRPM | TEMPERATURE: 98 F

## 2021-03-18 DIAGNOSIS — E53.8 VITAMIN B12 DEFICIENCY: Primary | ICD-10-CM

## 2021-03-18 PROCEDURE — 6360000002 HC RX W HCPCS: Performed by: SURGERY

## 2021-03-18 PROCEDURE — 96372 THER/PROPH/DIAG INJ SC/IM: CPT

## 2021-03-18 RX ORDER — CYANOCOBALAMIN 1000 UG/ML
1000 INJECTION INTRAMUSCULAR; SUBCUTANEOUS ONCE
Status: CANCELLED
Start: 2021-04-15

## 2021-03-18 RX ORDER — CYANOCOBALAMIN 1000 UG/ML
1000 INJECTION INTRAMUSCULAR; SUBCUTANEOUS ONCE
Status: COMPLETED
Start: 2021-03-18 | End: 2021-03-18

## 2021-03-18 RX ADMIN — CYANOCOBALAMIN 1000 MCG: 1000 INJECTION, SOLUTION INTRAMUSCULAR; SUBCUTANEOUS at 09:10

## 2021-03-29 ENCOUNTER — TELEPHONE (OUTPATIENT)
Dept: BARIATRICS/WEIGHT MGMT | Age: 66
End: 2021-03-29
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
## 2021-04-14 ENCOUNTER — HOSPITAL ENCOUNTER (INPATIENT)
Age: 66
LOS: 5 days | Discharge: ANOTHER HOSPITAL | DRG: 885 | End: 2021-04-20
Attending: EMERGENCY MEDICINE | Admitting: PSYCHIATRY & NEUROLOGY
Payer: MEDICARE

## 2021-04-14 ENCOUNTER — APPOINTMENT (OUTPATIENT)
Dept: CT IMAGING | Age: 66
DRG: 885 | End: 2021-04-14
Payer: MEDICARE

## 2021-04-14 DIAGNOSIS — F23 ACUTE PSYCHOSIS (HCC): ICD-10-CM

## 2021-04-14 DIAGNOSIS — R41.82 ALTERED MENTAL STATUS, UNSPECIFIED ALTERED MENTAL STATUS TYPE: Primary | ICD-10-CM

## 2021-04-14 LAB
ACETAMINOPHEN LEVEL: <5 MCG/ML (ref 10–30)
ALBUMIN SERPL-MCNC: 3.5 G/DL (ref 3.5–5.2)
ALP BLD-CCNC: 57 U/L (ref 40–129)
ALT SERPL-CCNC: 24 U/L (ref 0–40)
AMMONIA: 13 UMOL/L (ref 16–60)
AMPHETAMINE SCREEN, URINE: NOT DETECTED
ANION GAP SERPL CALCULATED.3IONS-SCNC: 9 MMOL/L (ref 7–16)
AST SERPL-CCNC: 22 U/L (ref 0–39)
BARBITURATE SCREEN URINE: NOT DETECTED
BENZODIAZEPINE SCREEN, URINE: NOT DETECTED
BILIRUB SERPL-MCNC: <0.2 MG/DL (ref 0–1.2)
BUN BLDV-MCNC: 27 MG/DL (ref 8–23)
CALCIUM SERPL-MCNC: 8.4 MG/DL (ref 8.6–10.2)
CANNABINOID SCREEN URINE: NOT DETECTED
CHLORIDE BLD-SCNC: 110 MMOL/L (ref 98–107)
CO2: 21 MMOL/L (ref 22–29)
COCAINE METABOLITE SCREEN URINE: NOT DETECTED
CREAT SERPL-MCNC: 1.3 MG/DL (ref 0.7–1.2)
ETHANOL: <10 MG/DL (ref 0–0.08)
FENTANYL SCREEN, URINE: NOT DETECTED
GFR AFRICAN AMERICAN: >60
GFR NON-AFRICAN AMERICAN: 55 ML/MIN/1.73
GLUCOSE BLD-MCNC: 82 MG/DL (ref 74–99)
HCT VFR BLD CALC: 34.5 % (ref 37–54)
HEMOGLOBIN: 10.9 G/DL (ref 12.5–16.5)
Lab: NORMAL
MCH RBC QN AUTO: 29.9 PG (ref 26–35)
MCHC RBC AUTO-ENTMCNC: 31.6 % (ref 32–34.5)
MCV RBC AUTO: 94.8 FL (ref 80–99.9)
METHADONE SCREEN, URINE: NOT DETECTED
OPIATE SCREEN URINE: NOT DETECTED
OXYCODONE URINE: NOT DETECTED
PDW BLD-RTO: 14.2 FL (ref 11.5–15)
PHENCYCLIDINE SCREEN URINE: NOT DETECTED
PLATELET # BLD: 278 E9/L (ref 130–450)
PMV BLD AUTO: 8.9 FL (ref 7–12)
POTASSIUM SERPL-SCNC: 4.3 MMOL/L (ref 3.5–5)
RBC # BLD: 3.64 E12/L (ref 3.8–5.8)
SALICYLATE, SERUM: <0.3 MG/DL (ref 0–30)
SARS-COV-2, NAAT: NOT DETECTED
SODIUM BLD-SCNC: 140 MMOL/L (ref 132–146)
TOTAL PROTEIN: 7 G/DL (ref 6.4–8.3)
TRICYCLIC ANTIDEPRESSANTS SCREEN SERUM: NEGATIVE NG/ML
TROPONIN: <0.01 NG/ML (ref 0–0.03)
TSH SERPL DL<=0.05 MIU/L-ACNC: 2.87 UIU/ML (ref 0.27–4.2)
WBC # BLD: 4.9 E9/L (ref 4.5–11.5)

## 2021-04-14 PROCEDURE — 84443 ASSAY THYROID STIM HORMONE: CPT

## 2021-04-14 PROCEDURE — 70450 CT HEAD/BRAIN W/O DYE: CPT

## 2021-04-14 PROCEDURE — 81001 URINALYSIS AUTO W/SCOPE: CPT

## 2021-04-14 PROCEDURE — 99283 EMERGENCY DEPT VISIT LOW MDM: CPT

## 2021-04-14 PROCEDURE — 85027 COMPLETE CBC AUTOMATED: CPT

## 2021-04-14 PROCEDURE — 6360000002 HC RX W HCPCS: Performed by: EMERGENCY MEDICINE

## 2021-04-14 PROCEDURE — 93005 ELECTROCARDIOGRAM TRACING: CPT | Performed by: NURSE PRACTITIONER

## 2021-04-14 PROCEDURE — 82077 ASSAY SPEC XCP UR&BREATH IA: CPT

## 2021-04-14 PROCEDURE — 87635 SARS-COV-2 COVID-19 AMP PRB: CPT

## 2021-04-14 PROCEDURE — 2580000003 HC RX 258: Performed by: EMERGENCY MEDICINE

## 2021-04-14 PROCEDURE — 80143 DRUG ASSAY ACETAMINOPHEN: CPT

## 2021-04-14 PROCEDURE — 80053 COMPREHEN METABOLIC PANEL: CPT

## 2021-04-14 PROCEDURE — 80307 DRUG TEST PRSMV CHEM ANLYZR: CPT

## 2021-04-14 PROCEDURE — 96372 THER/PROPH/DIAG INJ SC/IM: CPT

## 2021-04-14 PROCEDURE — 80179 DRUG ASSAY SALICYLATE: CPT

## 2021-04-14 PROCEDURE — 84484 ASSAY OF TROPONIN QUANT: CPT

## 2021-04-14 PROCEDURE — 82140 ASSAY OF AMMONIA: CPT

## 2021-04-14 RX ORDER — DIPHENHYDRAMINE HYDROCHLORIDE 50 MG/ML
50 INJECTION INTRAMUSCULAR; INTRAVENOUS ONCE
Status: COMPLETED | OUTPATIENT
Start: 2021-04-14 | End: 2021-04-14

## 2021-04-14 RX ORDER — ZIPRASIDONE MESYLATE 20 MG/ML
INJECTION, POWDER, LYOPHILIZED, FOR SOLUTION INTRAMUSCULAR
Status: DISPENSED
Start: 2021-04-14 | End: 2021-04-15

## 2021-04-14 RX ORDER — ZIPRASIDONE MESYLATE 20 MG/ML
20 INJECTION, POWDER, LYOPHILIZED, FOR SOLUTION INTRAMUSCULAR ONCE
Status: COMPLETED | OUTPATIENT
Start: 2021-04-14 | End: 2021-04-14

## 2021-04-14 RX ORDER — LORAZEPAM 2 MG/ML
INJECTION INTRAMUSCULAR
Status: DISPENSED
Start: 2021-04-14 | End: 2021-04-15

## 2021-04-14 RX ORDER — LORAZEPAM 2 MG/ML
2 INJECTION INTRAMUSCULAR ONCE
Status: COMPLETED | OUTPATIENT
Start: 2021-04-14 | End: 2021-04-14

## 2021-04-14 RX ORDER — DIPHENHYDRAMINE HYDROCHLORIDE 50 MG/ML
INJECTION INTRAMUSCULAR; INTRAVENOUS
Status: DISPENSED
Start: 2021-04-14 | End: 2021-04-15

## 2021-04-14 RX ADMIN — LORAZEPAM 2 MG: 2 INJECTION INTRAMUSCULAR; INTRAVENOUS at 17:07

## 2021-04-14 RX ADMIN — DIPHENHYDRAMINE HYDROCHLORIDE 50 MG: 50 INJECTION, SOLUTION INTRAMUSCULAR; INTRAVENOUS at 17:08

## 2021-04-14 RX ADMIN — WATER 1.2 ML: 1 INJECTION INTRAMUSCULAR; INTRAVENOUS; SUBCUTANEOUS at 17:08

## 2021-04-14 RX ADMIN — ZIPRASIDONE MESYLATE 20 MG: 20 INJECTION, POWDER, LYOPHILIZED, FOR SOLUTION INTRAMUSCULAR at 17:08

## 2021-04-14 NOTE — ED PROVIDER NOTES
are equal, round, and reactive to light. Neck:      Musculoskeletal: Normal range of motion and neck supple. Cardiovascular:      Rate and Rhythm: Normal rate and regular rhythm. Heart sounds: Normal heart sounds. No murmur. Comments: 2+ radial and dorsal pedis pulses bilaterally  Pulmonary:      Effort: Pulmonary effort is normal. No respiratory distress. Breath sounds: Normal breath sounds. No wheezing. Abdominal:      Palpations: Abdomen is soft. Tenderness: There is no abdominal tenderness. There is no guarding or rebound. Musculoskeletal: Normal range of motion. General: No tenderness or deformity. Right lower leg: No edema. Left lower leg: No edema. Skin:     General: Skin is warm and dry. Capillary Refill: Capillary refill takes less than 2 seconds. Findings: No rash. Neurological:      General: No focal deficit present. Mental Status: He is alert and oriented to person, place, and time. Cranial Nerves: No cranial nerve deficit. Sensory: No sensory deficit. Motor: No weakness or abnormal muscle tone. Psychiatric:      Comments: Only occasionally answers questions. Becomes agitated and not following commands occasionally. No obvious hallucinations. Seen Using the remote as a telephone. Procedures     MDM   This is a 79-year-old male who presents with altered mental status worsening psychiatric condition. Previously diagnosed at an outside facility with bipolar and manic. Apparently all this is new in the last 3 weeks. The wife does not feel safe at home as he is having outbursts and increasingly impulsive. Patient only answering some of my questions. CT of the head without contrast showed no acute intracranial normality. Patient cleared from medical perspective after lab work for further behavioral health evaluation. .    ED Course as of Apr 14 2109 Wed Apr 14, 2021   1724 Patient medically cleared to be evaluated by behavioral health team    [LM]      ED Course User Index  [LM] Chago Baird DO       ED Course as of Apr 14 2109 Wed Apr 14, 2021   1724 Patient medically cleared to be evaluated by behavioral health team    [LM]      ED Course User Index  [LM] Chago Baird DO       --------------------------------------------- PAST HISTORY ---------------------------------------------  Past Medical History:  has a past medical history of Arthritis, Atrial fibrillation ([address] Back pain, CPAP (continuous positive airway pressure) dependence, Difficulty swallowing, DJD (degenerative joint disease), GERD (gastroesophageal reflux disease), High cholesterol, Hx of cardiovascular stress test, Knee pain, Morbid obesity due to excess calories ([address] Non-stress test nonreactive, ABENA (obstructive sleep apnea), PONV (postoperative nausea and vomiting), Problems with hearing, Sleep apnea, SOBOE (shortness of breath on exertion), and Thyroid disease. Past Surgical History:  has a past surgical history that includes Appendectomy; shoulder surgery; knee surgery; Cholecystectomy; Colonoscopy; back surgery; Upper gastrointestinal endoscopy (03/08/2019); Upper gastrointestinal endoscopy (N/A, 3/8/2019); Cardioversion; Colonoscopy (05/31/2019); Colonoscopy (N/A, 5/31/2019); Knee arthroscopy (Bilateral, 1980's); Rafael-en-Y Gastric Bypass (N/A, 6/24/2019); and Upper gastrointestinal endoscopy (N/A, 8/30/2019). Social History:  reports that he has never smoked. He has never used smokeless tobacco. He reports that he does not drink alcohol or use drugs. Family History: family history includes Cancer in his maternal grandmother; Dementia in his mother; Diabetes in his maternal grandfather; Heart Attack in his paternal grandfather and paternal grandmother; Heart Disease in his father; No Known Problems in his brother and sister. The patients home medications have been reviewed.     Allergies: Atorvastatin calcium and Lactose    -------------------------------------------------- RESULTS -------------------------------------------------    LABS:  Results for orders placed or performed during the hospital encounter of 04/14/21   COVID-19, Rapid   Result Value Ref Range    SARS-CoV-2, NAAT Not Detected Not Detected   CBC   Result Value Ref Range    WBC 4.9 4.5 - 11.5 E9/L    RBC 3.64 (L) 3.80 - 5.80 E12/L    Hemoglobin 10.9 (L) 12.5 - 16.5 g/dL    Hematocrit 34.5 (L) 37.0 - 54.0 %    MCV 94.8 80.0 - 99.9 fL    MCH 29.9 26.0 - 35.0 pg    MCHC 31.6 (L) 32.0 - 34.5 %    RDW 14.2 11.5 - 15.0 fL    Platelets 342 735 - 741 E9/L    MPV 8.9 7.0 - 12.0 fL   Comprehensive Metabolic Panel   Result Value Ref Range    Sodium 140 132 - 146 mmol/L    Potassium 4.3 3.5 - 5.0 mmol/L    Chloride 110 (H) 98 - 107 mmol/L    CO2 21 (L) 22 - 29 mmol/L    Anion Gap 9 7 - 16 mmol/L    Glucose 82 74 - 99 mg/dL    BUN 27 (H) 8 - 23 mg/dL    CREATININE 1.3 (H) 0.7 - 1.2 mg/dL    GFR Non-African American 55 >=60 mL/min/1.73    GFR African American >60     Calcium 8.4 (L) 8.6 - 10.2 mg/dL    Total Protein 7.0 6.4 - 8.3 g/dL    Albumin 3.5 3.5 - 5.2 g/dL    Total Bilirubin <0.2 0.0 - 1.2 mg/dL    Alkaline Phosphatase 57 40 - 129 U/L    ALT 24 0 - 40 U/L    AST 22 0 - 39 U/L   Troponin   Result Value Ref Range    Troponin <0.01 0.00 - 0.03 ng/mL   TSH WITHOUT REFLEX   Result Value Ref Range    TSH 2.870 0.270 - 4.200 uIU/mL   Ammonia   Result Value Ref Range    Ammonia 13.0 (L) 16.0 - 60.0 umol/L   Serum Drug Screen   Result Value Ref Range    Ethanol Lvl <10 mg/dL    Acetaminophen Level <5.0 (L) 10.0 - 35.7 mcg/mL    Salicylate, Serum <6.4 0.0 - 30.0 mg/dL    TCA Scrn NEGATIVE Cutoff:300 ng/mL   EKG 12 Lead   Result Value Ref Range    Ventricular Rate 50 BPM    Atrial Rate 50 BPM    P-R Interval 196 ms    QRS Duration 100 ms    Q-T Interval 448 ms    QTc Calculation (Bazett) 408 ms    P Axis 60 degrees    R Axis -47 degrees    T Axis 73 degrees RADIOLOGY:  CT HEAD WO CONTRAST   Final Result   No acute intracranial abnormality. EKG:  This EKG is signed and interpreted by me. Rate: 46bpm  Rhythm: sinus  Interpretation: Anterior fascicular block. No ST segment elevation or depression. Comparison: stable as compared to patient's most recent EKG      ------------------------- NURSING NOTES AND VITALS REVIEWED ---------------------------  Date / Time Roomed:  4/14/2021  1:41 PM  ED Bed [facility]:  27[facility]    The nursing notes within the ED encounter and vital signs as below have been reviewed. Patient Vitals for the past 24 hrs:   BP Temp Pulse Resp SpO2 Height Weight   04/14/21 1251 106/67 -- -- 16 -- 6' 3\" (1.905 m) 160 lb (72.6 kg)   04/14/21 1231 -- 97.5 °F (36.4 °C) 55 -- 96 % -- --       Oxygen Saturation Interpretation: Normal    ------------------------------------------ PROGRESS NOTES ------------------------------------------    Counseling:  I have spoken with the patient and discussed todays results, in addition to providing specific details for the plan of care and counseling regarding the diagnosis and prognosis. Their questions are answered at this time and they are agreeable with the plan of admission.    --------------------------------- ADDITIONAL PROVIDER NOTES ---------------------------------    This patient has remained hemodynamically stable during their ED course. Diagnosis:  1. Altered mental status, unspecified altered mental status type    2. Acute psychosis ([facility]        Disposition:  Patient's disposition: Admit to mental health unit - medically cleared for admission  Patient's condition is stable.         Kisha Panchal DO  Resident  04/14/21 [id number]

## 2021-04-15 PROBLEM — F23 ACUTE PSYCHOSIS (HCC): Status: ACTIVE | Noted: 2021-04-15

## 2021-04-15 LAB
BACTERIA: ABNORMAL /HPF
BILIRUBIN URINE: NEGATIVE
BLOOD, URINE: NEGATIVE
CLARITY: CLEAR
COLOR: YELLOW
EKG ATRIAL RATE: 50 BPM
EKG P AXIS: 60 DEGREES
EKG P-R INTERVAL: 196 MS
EKG Q-T INTERVAL: 448 MS
EKG QRS DURATION: 100 MS
EKG QTC CALCULATION (BAZETT): 408 MS
EKG R AXIS: -47 DEGREES
EKG T AXIS: 73 DEGREES
EKG VENTRICULAR RATE: 50 BPM
EPITHELIAL CELLS, UA: ABNORMAL /HPF
GLUCOSE URINE: NEGATIVE MG/DL
KETONES, URINE: NEGATIVE MG/DL
LEUKOCYTE ESTERASE, URINE: ABNORMAL
NITRITE, URINE: NEGATIVE
PH UA: 5.5 (ref 5–9)
PROTEIN UA: NEGATIVE MG/DL
RBC UA: ABNORMAL /HPF (ref 0–2)
SPECIFIC GRAVITY UA: 1.02 (ref 1–1.03)
UROBILINOGEN, URINE: 0.2 E.U./DL
WBC UA: ABNORMAL /HPF (ref 0–5)

## 2021-04-15 PROCEDURE — 1240000000 HC EMOTIONAL WELLNESS R&B

## 2021-04-15 PROCEDURE — 6370000000 HC RX 637 (ALT 250 FOR IP): Performed by: NURSE PRACTITIONER

## 2021-04-15 PROCEDURE — 93010 ELECTROCARDIOGRAM REPORT: CPT | Performed by: INTERNAL MEDICINE

## 2021-04-15 PROCEDURE — 99222 1ST HOSP IP/OBS MODERATE 55: CPT | Performed by: NURSE PRACTITIONER

## 2021-04-15 RX ORDER — CHOLECALCIFEROL (VITAMIN D3) 50 MCG
5000 TABLET ORAL DAILY
Status: DISCONTINUED | OUTPATIENT
Start: 2021-04-15 | End: 2021-04-20 | Stop reason: HOSPADM

## 2021-04-15 RX ORDER — POTASSIUM CHLORIDE 750 MG/1
10 TABLET, EXTENDED RELEASE ORAL 2 TIMES DAILY
Status: DISCONTINUED | OUTPATIENT
Start: 2021-04-15 | End: 2021-04-19

## 2021-04-15 RX ORDER — LEVOTHYROXINE SODIUM 0.07 MG/1
75 TABLET ORAL
Status: DISCONTINUED | OUTPATIENT
Start: 2021-04-15 | End: 2021-04-20 | Stop reason: HOSPADM

## 2021-04-15 RX ORDER — OLANZAPINE 2.5 MG/1
2.5 TABLET ORAL 2 TIMES DAILY
Status: DISCONTINUED | OUTPATIENT
Start: 2021-04-15 | End: 2021-04-19

## 2021-04-15 RX ORDER — HALOPERIDOL 5 MG/ML
3 INJECTION INTRAMUSCULAR EVERY 6 HOURS PRN
Status: DISCONTINUED | OUTPATIENT
Start: 2021-04-15 | End: 2021-04-20 | Stop reason: HOSPADM

## 2021-04-15 RX ORDER — FLUVOXAMINE MALEATE 50 MG/1
50 TABLET, COATED ORAL 2 TIMES DAILY
Status: DISCONTINUED | OUTPATIENT
Start: 2021-04-15 | End: 2021-04-19

## 2021-04-15 RX ORDER — MAGNESIUM HYDROXIDE/ALUMINUM HYDROXICE/SIMETHICONE 120; 1200; 1200 MG/30ML; MG/30ML; MG/30ML
30 SUSPENSION ORAL PRN
Status: DISCONTINUED | OUTPATIENT
Start: 2021-04-15 | End: 2021-04-20 | Stop reason: HOSPADM

## 2021-04-15 RX ORDER — ACETAMINOPHEN 325 MG/1
650 TABLET ORAL EVERY 4 HOURS PRN
Status: DISCONTINUED | OUTPATIENT
Start: 2021-04-15 | End: 2021-04-20 | Stop reason: HOSPADM

## 2021-04-15 RX ORDER — MULTIVITAMIN WITH IRON
1 TABLET ORAL DAILY
Status: DISCONTINUED | OUTPATIENT
Start: 2021-04-15 | End: 2021-04-20 | Stop reason: HOSPADM

## 2021-04-15 RX ORDER — PANTOPRAZOLE SODIUM 40 MG/1
40 TABLET, DELAYED RELEASE ORAL
Status: DISCONTINUED | OUTPATIENT
Start: 2021-04-16 | End: 2021-04-20 | Stop reason: HOSPADM

## 2021-04-15 RX ORDER — CARVEDILOL 6.25 MG/1
6.25 TABLET ORAL 2 TIMES DAILY
Status: DISCONTINUED | OUTPATIENT
Start: 2021-04-15 | End: 2021-04-18

## 2021-04-15 RX ORDER — TRAZODONE HYDROCHLORIDE 50 MG/1
25 TABLET ORAL NIGHTLY PRN
Status: DISCONTINUED | OUTPATIENT
Start: 2021-04-15 | End: 2021-04-20 | Stop reason: HOSPADM

## 2021-04-15 RX ORDER — LANOLIN ALCOHOL/MO/W.PET/CERES
3 CREAM (GRAM) TOPICAL DAILY
Status: DISCONTINUED | OUTPATIENT
Start: 2021-04-15 | End: 2021-04-20 | Stop reason: HOSPADM

## 2021-04-15 RX ORDER — HALOPERIDOL 2 MG/1
3 TABLET ORAL EVERY 6 HOURS PRN
Status: DISCONTINUED | OUTPATIENT
Start: 2021-04-15 | End: 2021-04-20 | Stop reason: HOSPADM

## 2021-04-15 RX ORDER — DIVALPROEX SODIUM 125 MG/1
125 CAPSULE, COATED PELLETS ORAL EVERY 12 HOURS SCHEDULED
Status: DISCONTINUED | OUTPATIENT
Start: 2021-04-15 | End: 2021-04-18

## 2021-04-15 RX ADMIN — DIVALPROEX SODIUM 125 MG: 125 CAPSULE, COATED PELLETS ORAL at 21:15

## 2021-04-15 RX ADMIN — Medication 3 MG: at 18:01

## 2021-04-15 RX ADMIN — DIVALPROEX SODIUM 125 MG: 125 CAPSULE, COATED PELLETS ORAL at 12:43

## 2021-04-15 RX ADMIN — Medication 1 TABLET: at 12:43

## 2021-04-15 RX ADMIN — LEVOTHYROXINE SODIUM 75 MCG: 0.07 TABLET ORAL at 12:44

## 2021-04-15 RX ADMIN — FLUVOXAMINE MALEATE 50 MG: 50 TABLET, COATED ORAL at 21:27

## 2021-04-15 RX ADMIN — FLUVOXAMINE MALEATE 50 MG: 50 TABLET, COATED ORAL at 12:43

## 2021-04-15 RX ADMIN — Medication 5000 UNITS: at 12:44

## 2021-04-15 RX ADMIN — OLANZAPINE 2.5 MG: 2.5 TABLET, FILM COATED ORAL at 12:43

## 2021-04-15 RX ADMIN — POTASSIUM CHLORIDE 10 MEQ: 750 TABLET, EXTENDED RELEASE ORAL at 21:15

## 2021-04-15 RX ADMIN — OLANZAPINE 2.5 MG: 2.5 TABLET, FILM COATED ORAL at 21:15

## 2021-04-15 RX ADMIN — CARVEDILOL 6.25 MG: 6.25 TABLET, FILM COATED ORAL at 12:44

## 2021-04-15 ASSESSMENT — SLEEP AND FATIGUE QUESTIONNAIRES
DO YOU USE A SLEEP AID: NO
AVERAGE NUMBER OF SLEEP HOURS: 6
AVERAGE NUMBER OF SLEEP HOURS: 6
DO YOU HAVE DIFFICULTY SLEEPING: NO
DO YOU HAVE DIFFICULTY SLEEPING: NO

## 2021-04-15 NOTE — PLAN OF CARE
Pt denies SI HI and hallucinations. Pt is medication compliant. Pt is intermittently confused. Pt stated he does not recall what happened. Pt observed to sleep in [place] [address] and reoriented to his room. No aggression. No behaviors. No voiced paranoia or delusions. Pt is eating provided meals. No groups. We will continue to provide support and comfort for the patient.      Problem: Altered Mood, Manic Behavior:  Goal: Mood stable  Description: Mood stable  Outcome: Met This Shift

## 2021-04-15 NOTE — PROGRESS NOTES
Leisure assessment incomplete. Responses unrelated to questions. Becoming increasingly confused when attempted to re frame. [place] Lisa away from conversation, trying to open unit door. Unable to answer any questions at this time. Will continue to complete as clears.

## 2021-04-15 NOTE — PROGRESS NOTES
Pt was in the [place], walking the unit. Quiet. Not social. No aggression or behaviors. Pt did take a short nap this afternoon. Continues to look lost and sad. We will continue to provide support and comfort for the patient.
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
## 2021-04-16 LAB
ANION GAP SERPL CALCULATED.3IONS-SCNC: 11 MMOL/L (ref 7–16)
BUN BLDV-MCNC: 26 MG/DL (ref 8–23)
CALCIUM SERPL-MCNC: 8.6 MG/DL (ref 8.6–10.2)
CHLORIDE BLD-SCNC: 111 MMOL/L (ref 98–107)
CHOLESTEROL, TOTAL: 141 MG/DL (ref 0–199)
CO2: 20 MMOL/L (ref 22–29)
CREAT SERPL-MCNC: 1.3 MG/DL (ref 0.7–1.2)
FOLATE: 8.9 NG/ML (ref 4.8–24.2)
GFR AFRICAN AMERICAN: >60
GFR NON-AFRICAN AMERICAN: 55 ML/MIN/1.73
GLUCOSE BLD-MCNC: 80 MG/DL (ref 74–99)
HBA1C MFR BLD: 4.7 % (ref 4–5.6)
HDLC SERPL-MCNC: 35 MG/DL
LDL CHOLESTEROL CALCULATED: 93 MG/DL (ref 0–99)
MAGNESIUM: 1.9 MG/DL (ref 1.6–2.6)
PHOSPHORUS: 3.7 MG/DL (ref 2.5–4.5)
POTASSIUM SERPL-SCNC: 4.4 MMOL/L (ref 3.5–5)
SODIUM BLD-SCNC: 142 MMOL/L (ref 132–146)
TRIGL SERPL-MCNC: 67 MG/DL (ref 0–149)
VITAMIN B-12: 413 PG/ML (ref 211–946)
VLDLC SERPL CALC-MCNC: 13 MG/DL

## 2021-04-16 PROCEDURE — 82607 VITAMIN B-12: CPT

## 2021-04-16 PROCEDURE — 83735 ASSAY OF MAGNESIUM: CPT

## 2021-04-16 PROCEDURE — 83036 HEMOGLOBIN GLYCOSYLATED A1C: CPT

## 2021-04-16 PROCEDURE — 94660 CPAP INITIATION&MGMT: CPT

## 2021-04-16 PROCEDURE — 84100 ASSAY OF PHOSPHORUS: CPT

## 2021-04-16 PROCEDURE — 80061 LIPID PANEL: CPT

## 2021-04-16 PROCEDURE — 1240000000 HC EMOTIONAL WELLNESS R&B

## 2021-04-16 PROCEDURE — 6370000000 HC RX 637 (ALT 250 FOR IP): Performed by: NURSE PRACTITIONER

## 2021-04-16 PROCEDURE — 36415 COLL VENOUS BLD VENIPUNCTURE: CPT

## 2021-04-16 PROCEDURE — 80048 BASIC METABOLIC PNL TOTAL CA: CPT

## 2021-04-16 PROCEDURE — 82746 ASSAY OF FOLIC ACID SERUM: CPT

## 2021-04-16 PROCEDURE — 99231 SBSQ HOSP IP/OBS SF/LOW 25: CPT | Performed by: NURSE PRACTITIONER

## 2021-04-16 PROCEDURE — 93005 ELECTROCARDIOGRAM TRACING: CPT | Performed by: NURSE PRACTITIONER

## 2021-04-16 RX ADMIN — OLANZAPINE 2.5 MG: 2.5 TABLET, FILM COATED ORAL at 10:41

## 2021-04-16 RX ADMIN — Medication 5000 UNITS: at 10:42

## 2021-04-16 RX ADMIN — FLUVOXAMINE MALEATE 50 MG: 50 TABLET, COATED ORAL at 10:43

## 2021-04-16 RX ADMIN — LEVOTHYROXINE SODIUM 75 MCG: 0.07 TABLET ORAL at 06:36

## 2021-04-16 RX ADMIN — OLANZAPINE 2.5 MG: 2.5 TABLET, FILM COATED ORAL at 20:45

## 2021-04-16 RX ADMIN — Medication 3 MG: at 17:27

## 2021-04-16 RX ADMIN — POTASSIUM CHLORIDE 10 MEQ: 750 TABLET, EXTENDED RELEASE ORAL at 10:43

## 2021-04-16 RX ADMIN — PANTOPRAZOLE SODIUM 40 MG: 40 TABLET, DELAYED RELEASE ORAL at 06:36

## 2021-04-16 RX ADMIN — Medication 1 TABLET: at 10:44

## 2021-04-16 RX ADMIN — FLUVOXAMINE MALEATE 50 MG: 50 TABLET, COATED ORAL at 20:45

## 2021-04-16 RX ADMIN — POTASSIUM CHLORIDE 10 MEQ: 750 TABLET, EXTENDED RELEASE ORAL at 20:45

## 2021-04-16 RX ADMIN — DIVALPROEX SODIUM 125 MG: 125 CAPSULE, COATED PELLETS ORAL at 20:45

## 2021-04-16 RX ADMIN — DIVALPROEX SODIUM 125 MG: 125 CAPSULE, COATED PELLETS ORAL at 10:42

## 2021-04-16 NOTE — PROGRESS NOTES
Tristan served Dr. Som Veras from Electrophysiology for new consult from [facility]

## 2021-04-16 NOTE — PLAN OF CARE
Patient appears sad and confused but brightens when approached. His thought process is slow and his responses are vague. He correctly identified the year and knew he was in the hospital but thought he was in [facility] [employer]. He could not name the president but knew \"the one after Trump\". Patient could not specify how many years he has been  or the number of children he has. He was not able to tell me what brought him to the hospital.  Patient is pleasant and cooperative. He denies concerns or complaints. Patient ate less than 25% of breakfast and declined lunch.      Problem: Altered Mood, Manic Behavior:  Goal: Mood stable  Description: Mood stable  Outcome: Met This Shift     Problem: Altered Mood, Manic Behavior:  Goal: Ability to interact with others will improve  Description: Ability to interact with others will improve  Outcome: Not Met This Shift

## 2021-04-16 NOTE — PROGRESS NOTES
Patient resting quiet to self at this time, respirations are even and unlabored, no signs or symptoms of distress or discomfort. Staff will continue to conduct environmental rounds to ensure the safety of everyone on the unit. Staff will provide support and interventions as requested or required.

## 2021-04-16 NOTE — PROGRESS NOTES
BEHAVIORAL HEALTH FOLLOW-UP NOTE     4/16/2021     Patient was seen and examined in person, Chart reviewed   Patient's case discussed with staff/team    Chief Complaint: pleasantly confused    Interim History:     Patient is observed in the day room this morning while he is eating his breakfast.  He remains altered, pleasantly confused. He has had no behavioral outburst, he is taking his medications. He is extremely delayed in his responses. He seems to have slow processing. Is alert to self and place. Today the patient experienced an episode of bradycardia with a heart rate of 40. Sound was consulted met with the patient and made changes to medications. Their assistance with the patient is greatly appreciated.       Appetite:   [x] Normal/Unchanged  [] Increased  [] Decreased      Sleep:       [x] Normal/Unchanged  [] Fair       [] Poor              Energy:    [] Normal/Unchanged  [] Increased  [x] Decreased        SI [] Present  [x] Absent    HI  []Present  [x] Absent     Aggression:  [] yes  [x] no    Patient is [x] able  [] unable to CONTRACT FOR SAFETY     PAST MEDICAL/PSYCHIATRIC HISTORY:   Past Medical History:   Diagnosis Date    Arthritis     Atrial fibrillation (HCC)     Back pain     CPAP (continuous positive airway pressure) dependence     setting 15    Difficulty swallowing     DJD (degenerative joint disease)     GERD (gastroesophageal reflux disease)     High cholesterol     Hx of cardiovascular stress test 04/12/2019    Lexiscan stress test    Knee pain     Morbid obesity due to excess calories ([address]     Non-stress test nonreactive     2011    ABENA (obstructive sleep apnea) 6/25/2019    PONV (postoperative nausea and vomiting)     Problems with hearing     Sleep apnea     SOBOE (shortness of breath on exertion)     Thyroid disease        FAMILY/SOCIAL HISTORY:  Family History   Problem Relation Age of Onset    Dementia Mother     Heart Disease Father     No Known Problems Sister     No Known Problems Brother     Cancer Maternal Grandmother     Diabetes Maternal Grandfather     Heart Attack Paternal Grandmother     Heart Attack Paternal Grandfather      Social History     Socioeconomic History    Marital status: Life Partner     Spouse name: Not on file    Number of children: Not on file    Years of education: Not on file    Highest education level: Not on file   Occupational History    Not on file   Social Needs    Financial resource strain: Not on file    Food insecurity     Worry: Not on file     Inability: Not on file   [employer] needs     Medical: Not on file     Non-medical: Not on file   Tobacco Use    Smoking status: Never Smoker    Smokeless tobacco: Never Used   Substance and Sexual Activity    Alcohol use: No    Drug use: No    Sexual activity: Yes   Lifestyle    Physical activity     Days per week: Not on file     Minutes per session: Not on file    Stress: Not on file   Relationships    Social connections     Talks on phone: Not on file     Gets together: Not on file     Attends Latter-day service: Not on file     Active member of club or organization: Not on file     Attends meetings of clubs or organizations: Not on file     Relationship status: Not on file    Intimate partner violence     Fear of current or ex partner: Not on file     Emotionally abused: Not on file     Physically abused: Not on file     Forced sexual activity: Not on file   Other Topics Concern    Not on file   Social History Narrative    Not on file           ROS:  [x] All negative/unchanged except if checked.  Explain positive(checked items) below:  [] Constitutional  [] Eyes  [] Ear/Nose/Mouth/Throat  [] Respiratory  [] CV  [] GI  []   [] Musculoskeletal  [] Skin/Breast  [] Neurological  [] Endocrine  [] Heme/Lymph  [] Allergic/Immunologic    Explanation:     MEDICATIONS:    Current Facility-Administered Medications:     acetaminophen (TYLENOL) tablet 650 mg, 650 mg, Oral, Q4H PRN, Tera Briggs MD    magnesium hydroxide (MILK OF MAGNESIA) 400 MG/5ML suspension 30 mL, 30 mL, Oral, Daily PRN, Tera Briggs MD    aluminum & magnesium hydroxide-simethicone (MAALOX) 200-200-20 MG/5ML suspension 30 mL, 30 mL, Oral, PRN, Tera Briggs MD    haloperidol lactate (HALDOL) injection 3 mg, 3 mg, Intramuscular, Q6H PRN **OR** haloperidol (HALDOL) tablet 3 mg, 3 mg, Oral, Q6H PRN, Tera Briggs MD    traZODone (DESYREL) tablet 25 mg, 25 mg, Oral, Nightly PRN, Tera Briggs MD    [Held by provider] carvedilol (COREG) tablet 6.25 mg, 6.25 mg, Oral, BID, NATHALIE Knapp CNP, Stopped at 04/15/21 2113    vitamin D (CHOLECALCIFEROL) tablet 5,000 Units, 5,000 Units, Oral, Daily, NATHALIE Knapp - CNP, 5,000 Units at 04/16/21 1042    levothyroxine (SYNTHROID) tablet 75 mcg, 75 mcg, Oral, QAM AC, NATHALIE Knapp - CNP, 75 mcg at 04/16/21 0636    multivitamin 1 tablet, 1 tablet, Oral, Daily, NATHALIE Knapp - CNP, 1 tablet at 04/16/21 1044    potassium chloride (KLOR-CON M) extended release tablet 10 mEq, 10 mEq, Oral, BID, NATHALIE Knapp - CNP, 10 mEq at 04/16/21 1043    pantoprazole (PROTONIX) tablet 40 mg, 40 mg, Oral, QAM AC, NATHALIE Knapp - CNP, 40 mg at 04/16/21 0636    OLANZapine (ZYPREXA) tablet 2.5 mg, 2.5 mg, Oral, BID, NATHALIE Knapp - CNP, 2.5 mg at 04/16/21 1041    fluvoxaMINE (LUVOX) tablet 50 mg, 50 mg, Oral, BID, NATHALIE Knapp - CNP, 50 mg at 04/16/21 1043    divalproex (DEPAKOTE SPRINKLE) capsule 125 mg, 125 mg, Oral, 2 times per day, NATHALIE Knapp CNP, 125 mg at 04/16/21 1042    melatonin tablet 3 mg, 3 mg, Oral, Daily, NATHALIE Knapp CNP, 3 mg at 04/15/21 1801      Examination:  /65   Pulse (!) 40   Temp 96.7 °F (35.9 °C) (Temporal)   Resp 14   Ht 6' 3\" (1.905 m)   Wt 160 lb (72.6 kg)   SpO2 99%   BMI 20.00 kg/m²   Gait - steady  Medication side effects(SE): none reported  Mental Status Examination:    Level of consciousness:  within normal limits   Appearance:  fair grooming and fair hygiene  Behavior/Motor:  psychomotor retardation  Attitude toward examiner:  cooperative  Speech:  slow, increased latency and monotone   Mood: constricted  Affect:  blunted  Thought processes:  illogical   Thought content: pleasantly confused  Cognition:  Self and year    Concentration poor  Insight poor   Judgement poor     ASSESSMENT:   Patient symptoms are:  [] Well controlled  [] Improving  [] Worsening  [x] No change      Diagnosis:   Principal Problem:    Acute psychosis ([facility] [address]  Resolved Problems:    * No resolved hospital problems. *      LABS:    Recent Labs     04/14/21  1343   WBC 4.9   HGB 10.9*        Recent Labs     04/14/21  1343      K 4.3   *   CO2 21*   BUN 27*   CREATININE 1.3*   GLUCOSE 82     Recent Labs     04/14/21  1343   BILITOT <0.2   ALKPHOS 57   AST 22   ALT 24     Lab Results   Component Value Date    LABAMPH NOT DETECTED 04/14/2021    BARBSCNU NOT DETECTED 04/14/2021    LABBENZ NOT DETECTED 04/14/2021    LABMETH NOT DETECTED 04/14/2021    OPIATESCREENURINE NOT DETECTED 04/14/2021    PHENCYCLIDINESCREENURINE NOT DETECTED 04/14/2021    ETOH <10 04/14/2021     Lab Results   Component Value Date    TSH 2.870 04/14/2021     No results found for: LITHIUM  No results found for: VALPROATE, CBMZ          Treatment Plan:  The patient's diagnosis, treatment plan, medication management were formulated after patient was seen directly by the attending physician and myself and all relevant documentation was reviewed. Reviewed current Medications with the patient. Risk, benefit, side effects, possible outcomes of the medication and alternatives discussed with the patient and the patient demonstrated understanding.   The patient was also educated that the outcome of treatment will depend on the medication compliance as directed by the prescribers along with

## 2021-04-16 NOTE — CONSULTS
Thyroid disease        Past Surgical History:        Procedure Laterality Date    APPENDECTOMY      BACK SURGERY      laminectomy     CARDIOVERSION      2 years ago dr bolanos [facility]    CHOLECYSTECTOMY      COLONOSCOPY      COLONOSCOPY  05/31/2019    COLONOSCOPY N/A 5/31/2019    COLONOSCOPY performed by Jon Acosta MD at [facility] ARTHROSCOPY Bilateral 1980's    KNEE SURGERY      both knees- scopes rt x2 left x 1    KARINA-EN-Y GASTRIC BYPASS N/A 6/24/2019    GASTRIC BYPASS KARINA-EN-Y LAPAROSCOPIC performed by Jon Acosta MD at [address]  [facility] ENDOSCOPY  03/08/2019    UPPER GASTROINTESTINAL ENDOSCOPY N/A 3/8/2019    EGD BIOPSY performed by Jon Acosta MD at [facility] N/A 8/30/2019    EGD BIOPSY performed by Jon Acosta MD at [place] [address]       Medications Prior to Admission:    Prior to Admission medications    Medication Sig Start Date End Date Taking? Authorizing Provider   cyanocobalamin 1000 MCG/ML injection Inject 1 mL into the muscle once for 1 dose 7/15/20 7/15/20  Jon Acosta MD   ondansetron (ZOFRAN ODT) 4 MG disintegrating tablet disintegrating tabs.  6/18/20   Jon Acosta MD   vitamin C (ASCORBIC ACID) 500 MG tablet Take 1,000 mg by mouth daily    Historical Provider, MD   ondansetron (ZOFRAN) 4 MG tablet Take 1 tablet by mouth 3 times daily as needed for Nausea or Vomiting  Patient not taking: Reported on 7/23/2020 6/10/20   Jon Acosta MD   zinc 50 MG CAPS Take one capsule every Mon, Wed and Fri for six weeks 2/7/20   Fartun Harrington MD   Potassium 75 MG TABS Take 1 tablet by mouth 2 times daily 1/29/20   Jon Acosta MD   ondansetron [facility]) 8 MG tablet Take 1 tablet by mouth every 8 hours as needed for Nausea or Vomiting  Patient not taking: Reported on 7/23/2020 9/11/19   Jon Acosta MD   Calcium Carbonate (CALCI-CHEW PO) Take 3 tablets by mouth daily TRACY Bowles. We will continue to follow along. Please do not hesitate to contact us if needed.    +++++++++++++++++++++++++++++++++++++++++++++++++  Dano Vo [address] [place] [address]  +++++++++++++++++++++++++++++++++++++++++++++++++  NOTE: This report was transcribed using voice recognition software. Every effort was made to ensure accuracy; however, inadvertent computerized transcription errors may be present.

## 2021-04-16 NOTE — PLAN OF CARE
Problem: Altered Mood, Manic Behavior:  Goal: Ability to interact with others will improve  Description: Ability to interact with others will improve  Outcome: Ongoing     Problem: Altered Mood, Manic Behavior:  Goal: Mood stable  Description: Mood stable  4/15/2021 2139 by Orlando Ivy RN  Outcome: Ongoing    Patient denies SI/HI and hallucinations. Patent is alert and oriented to person, place, and month. Patient is isolative to room. Patient takes prescribed medications without issue. Will continue to offer support and comfort to patient.

## 2021-04-16 NOTE — PROGRESS NOTES
Spoke with Chris, patient's significant other. When asked about his recent appetite, she reports he has lost 200 lbs in 2 years after bariatric surgery. She reports he will eat small amounts of fish, apple sauce, mashed potatoes, yogurt, ice cream, and mac & cheese. Requested dietitian consult.

## 2021-04-17 LAB
EKG ATRIAL RATE: 31 BPM
EKG ATRIAL RATE: 48 BPM
EKG P AXIS: 85 DEGREES
EKG P-R INTERVAL: 212 MS
EKG P-R INTERVAL: 214 MS
EKG Q-T INTERVAL: 454 MS
EKG Q-T INTERVAL: 536 MS
EKG QRS DURATION: 102 MS
EKG QRS DURATION: 102 MS
EKG QTC CALCULATION (BAZETT): 385 MS
EKG QTC CALCULATION (BAZETT): 405 MS
EKG R AXIS: -56 DEGREES
EKG R AXIS: -59 DEGREES
EKG T AXIS: 52 DEGREES
EKG T AXIS: 59 DEGREES
EKG VENTRICULAR RATE: 31 BPM
EKG VENTRICULAR RATE: 48 BPM

## 2021-04-17 PROCEDURE — 93010 ELECTROCARDIOGRAM REPORT: CPT | Performed by: INTERNAL MEDICINE

## 2021-04-17 PROCEDURE — 6370000000 HC RX 637 (ALT 250 FOR IP): Performed by: NURSE PRACTITIONER

## 2021-04-17 PROCEDURE — 93005 ELECTROCARDIOGRAM TRACING: CPT | Performed by: NURSE PRACTITIONER

## 2021-04-17 PROCEDURE — 94660 CPAP INITIATION&MGMT: CPT

## 2021-04-17 PROCEDURE — 99222 1ST HOSP IP/OBS MODERATE 55: CPT | Performed by: STUDENT IN AN ORGANIZED HEALTH CARE EDUCATION/TRAINING PROGRAM

## 2021-04-17 PROCEDURE — 99232 SBSQ HOSP IP/OBS MODERATE 35: CPT | Performed by: NURSE PRACTITIONER

## 2021-04-17 PROCEDURE — 6370000000 HC RX 637 (ALT 250 FOR IP): Performed by: PSYCHIATRY & NEUROLOGY

## 2021-04-17 PROCEDURE — 1240000000 HC EMOTIONAL WELLNESS R&B

## 2021-04-17 RX ORDER — LISINOPRIL 10 MG/1
5 TABLET ORAL DAILY
Status: DISCONTINUED | OUTPATIENT
Start: 2021-04-17 | End: 2021-04-18

## 2021-04-17 RX ORDER — AMLODIPINE BESYLATE 5 MG/1
5 TABLET ORAL DAILY
Status: DISCONTINUED | OUTPATIENT
Start: 2021-04-17 | End: 2021-04-18

## 2021-04-17 RX ADMIN — POTASSIUM CHLORIDE 10 MEQ: 750 TABLET, EXTENDED RELEASE ORAL at 09:20

## 2021-04-17 RX ADMIN — FLUVOXAMINE MALEATE 50 MG: 50 TABLET, COATED ORAL at 09:21

## 2021-04-17 RX ADMIN — Medication 5000 UNITS: at 09:20

## 2021-04-17 RX ADMIN — AMLODIPINE BESYLATE 5 MG: 5 TABLET ORAL at 10:32

## 2021-04-17 RX ADMIN — PANTOPRAZOLE SODIUM 40 MG: 40 TABLET, DELAYED RELEASE ORAL at 06:47

## 2021-04-17 RX ADMIN — OLANZAPINE 2.5 MG: 2.5 TABLET, FILM COATED ORAL at 22:03

## 2021-04-17 RX ADMIN — POTASSIUM CHLORIDE 10 MEQ: 750 TABLET, EXTENDED RELEASE ORAL at 22:49

## 2021-04-17 RX ADMIN — Medication 3 MG: at 17:46

## 2021-04-17 RX ADMIN — OLANZAPINE 2.5 MG: 2.5 TABLET, FILM COATED ORAL at 09:20

## 2021-04-17 RX ADMIN — FLUVOXAMINE MALEATE 50 MG: 50 TABLET, COATED ORAL at 22:03

## 2021-04-17 RX ADMIN — LEVOTHYROXINE SODIUM 75 MCG: 0.07 TABLET ORAL at 06:47

## 2021-04-17 RX ADMIN — DIVALPROEX SODIUM 125 MG: 125 CAPSULE, COATED PELLETS ORAL at 09:20

## 2021-04-17 RX ADMIN — LISINOPRIL 5 MG: 10 TABLET ORAL at 10:33

## 2021-04-17 RX ADMIN — TRAZODONE HYDROCHLORIDE 25 MG: 50 TABLET ORAL at 22:03

## 2021-04-17 RX ADMIN — DIVALPROEX SODIUM 125 MG: 125 CAPSULE, COATED PELLETS ORAL at 22:03

## 2021-04-17 RX ADMIN — Medication 1 TABLET: at 09:20

## 2021-04-17 NOTE — PROGRESS NOTES
Hospitalist Progress Note      Chart reviewed. EKG yesterday evening with HR of 31, 1st degree AVB and left fascicular block. EP was consulted. HR seems to have improved. Last docmented HR was 64. Patient is also hypertensive. Will add amlodipine and lisinopril. To check BMP tomorrow morning. Non-billable rounding. Thanks for allowing us to participate in the care of Greg Spence. We will continue to follow along.  Please do not hesitate to contact us if needed.  +++++++++++++++++++++++++++++++++++++++++++++++++  TETE Matamoros/ Azar Larkin , [place]

## 2021-04-17 NOTE — PROGRESS NOTES
Pt sitting in dining area. Checked his meal tray. Pt did not eat much. Asked patient why he didn't eat and he responded with \"No\". Pt then put his head down, eyes open. Pt became catatonic for a period of minutes. Another RN was called over who was unsuccessful in getting a verbal response. Sternal rub was performed twice. No response. RRT was called. Awaiting RRT team pt stood up and walked to his room. RRT cancelled. Asked pt what happened and he stated \"There's nothing wrong with me\". Pt is blunt, flat, evasive. We will continue to provide support and comfort for the patient.

## 2021-04-17 NOTE — PROGRESS NOTES
Patient denies SI,HI and hallucinations. Patient denies depression or anxiety at this time. Patient is alert to month and year off by 2 days with the day. Patient knows that he is in the hospital. Patient ate ice cream and applesauce x2 this evening. Patient is pleasant and cooperative. . Patient has been out on the unit watching television. Voices no questions or concerns at this time .  Will continue to monitor and observe

## 2021-04-17 NOTE — PROGRESS NOTES
Pt denies SI HI and hallucinations. Pt remains delayed. Appears sad. Flat, quiet. Pt wanders warren with blank stare. Pt does not offer conversation. Pt is medication compliant. Pt is selective with food. Pts son called and stated that pt is a picky eater and for the longest time was only eating strawberries, blueberries, bananas and yogurt. Pt encouraged to eat his meals. Pt is not attending groups. No voiced delusions or paranoid. No voiced depression or anxiety. We will continue to provide support and comfort for the patient.

## 2021-04-17 NOTE — PLAN OF CARE
Problem: Altered Mood, Manic Behavior:  Goal: Mood stable  Description: Mood stable  Outcome: Met This Shift     Problem: Altered Mood, Manic Behavior:  Goal: Ability to interact with others will improve  Description: Ability to interact with others will improve  Outcome: Not Met This Shift

## 2021-04-17 NOTE — PLAN OF CARE
[facility]  Day 3 Interdisciplinary Treatment Plan NOTE    Review Date & Time: 4/17/2021 0900    Patient was in treatment team    Admission Type:   Admission Type: Involuntary    Reason for admission:  Reason for Admission: Pt unable to relay why he was admitted.   Estimated Length of Stay Update:  3-5 days  Estimated Discharge Date Update: 4/21/2020    PATIENT STRENGTHS:  Patient Strengths Strengths: No significant Physical Illness, Positive Support  Patient Strengths and Limitations:Limitations: Multiple barriers to leisure interests, Difficulty problem solving/relies on others to help solve problems  Addictive Behavior:Addictive Behavior  In the past 3 months, have you felt or has someone told you that you have a problem with:  : None  Do you have a history of Chemical Use?: No  Do you have a history of Alcohol Use?: No  Do you have a history of Street Drug Abuse?: No  Histroy of Prescripton Drug Abuse?: No  Medical Problems:  Past Medical History:   Diagnosis Date    Arthritis     Atrial fibrillation (HCC)     Back pain     CPAP (continuous positive airway pressure) dependence     setting 15    Difficulty swallowing     DJD (degenerative joint disease)     GERD (gastroesophageal reflux disease)     High cholesterol     Hx of cardiovascular stress test 04/12/2019    Lexiscan stress test    Knee pain     Morbid obesity due to excess calories ([facility] [address]     Non-stress test nonreactive     2011    ABENA (obstructive sleep apnea) 6/25/2019    PONV (postoperative nausea and vomiting)     Problems with hearing     Sleep apnea     SOBOE (shortness of breath on exertion)     Thyroid disease        Risk:  Fall RiskTotal: 105  Abram Scale Abram Scale Score: 21  BVC Total: 1  Change in scores No. Changes to plan of Care No.    Status EXAM:   Status and Exam  Normal: No  Facial Expression: Flat  Affect: Blunt  Level of Consciousness: Other(see comment)(alert but catatonic at times)  Mood:Normal: No  Mood: Anxious  Motor Activity:Normal: No  Motor Activity: Decreased  Interview Behavior: Cooperative, Evasive  Preception: [place] to Person  Attention:Normal: No  Attention: Unable to Concentrate  Thought Processes: (impaired, delayed, confused)  Thought Content:Normal: No  Thought Content: Poverty of Content  Hallucinations: None  Delusions: No  Memory:Normal: No  Memory: Poor Recent, Poor Remote  Insight and Judgment: No  Insight and Judgment: Poor Judgment, Poor Insight  Present Suicidal Ideation: No  Present Homicidal Ideation: No    Daily Assessment Last Entry:   Daily Sleep (WDL): Within Defined Limits         Patient Currently in Pain: Denies  Daily Nutrition (WDL): Within Defined Limits    Patient Monitoring:  Frequency of Checks: 4 times per hour, close    Psychiatric Symptoms:   Depression Symptoms  Depression Symptoms: Impaired concentration  Anxiety Symptoms  Anxiety Symptoms: No problems reported or observed. [place] Symptoms  [place] Symptoms: No problems reported or observed. Psychosis Symptoms  Delusion Type: No problems reported or observed. Suicide Risk CSSR-S:  1) Within the past month, have you wished you were dead or wished you could go to sleep and not wake up? : No  2) Have you actually had any thoughts of killing yourself? : No  6) Have you ever done anything, started to do anything, or prepared to do anything to end your life?: No  Change in Result No. Change in Plan of care No.      EDUCATION:   Learner Progress Toward Treatment Goals: Reviewed results and recommendations of this team    Method: Small group    Outcome: Needs reinforcement and No evidence of Learning    PATIENT GOALS: No goal    PLAN/TREATMENT RECOMMENDATIONS UPDATE: Encourage group participation and continue to provide emotional support.     GOALS UPDATE:   Time frame for Short-Term Goals: 1-3 days      Bertrand Sofia RN

## 2021-04-17 NOTE — PLAN OF CARE
Problem: Altered Mood, Manic Behavior:  Goal: Ability to interact with others will improve  Description: Ability to interact with others will improve  4/19/6088 3001 by Senia Almeida RN  Outcome: Ongoing  4/16/2021 1303 by Dolores Briceño RN  Outcome: Not Met This Shift  Goal: Mood stable  Description: Mood stable  8/27/6891 0541 by Senia Almeida RN  Outcome: Ongoing  4/16/2021 1303 by Dolores Briceño RN  Outcome: Met This Shift

## 2021-04-17 NOTE — PROGRESS NOTES
BEHAVIORAL HEALTH FOLLOW-UP NOTE     4/17/2021     Patient was seen and examined in person, Chart reviewed   Patient's case discussed with staff/team    Chief Complaint: pleasantly confused    Interim History:     Patient is observed i up on the unit. He remains altered, pleasantly confused. He has had no behavioral outburst, he is taking his medications. He is extremely delayed in his responses. He seems to have slow processing. Is alert to self and place. Is being followed by medical for EKG abnormalities as well as bradycardia.   He seems confused with poor insight and judgment    Appetite:   [x] Normal/Unchanged  [] Increased  [] Decreased      Sleep:       [x] Normal/Unchanged  [] Fair       [] Poor              Energy:    [] Normal/Unchanged  [] Increased  [x] Decreased        SI [] Present  [x] Absent    HI  []Present  [x] Absent     Aggression:  [] yes  [x] no    Patient is [x] able  [] unable to CONTRACT FOR SAFETY     PAST MEDICAL/PSYCHIATRIC HISTORY:   Past Medical History:   Diagnosis Date    Arthritis     Atrial fibrillation (HCC)     Back pain     CPAP (continuous positive airway pressure) dependence     setting 15    Difficulty swallowing     DJD (degenerative joint disease)     GERD (gastroesophageal reflux disease)     High cholesterol     Hx of cardiovascular stress test 04/12/2019    Lexiscan stress test    Knee pain     Morbid obesity due to excess calories ([address]     Non-stress test nonreactive     2011    ABENA (obstructive sleep apnea) 6/25/2019    PONV (postoperative nausea and vomiting)     Problems with hearing     Sleep apnea     SOBOE (shortness of breath on exertion)     Thyroid disease        FAMILY/SOCIAL HISTORY:  Family History   Problem Relation Age of Onset    Dementia Mother     Heart Disease Father     No Known Problems Sister     No Known Problems Brother     Cancer Maternal Grandmother     Diabetes Maternal Grandfather     Heart Attack Paternal Grandmother     Heart Attack Paternal Grandfather      Social History     Socioeconomic History    Marital status: Life Partner     Spouse name: Not on file    Number of children: Not on file    Years of education: Not on file    Highest education level: Not on file   Occupational History    Not on file   Social Needs    Financial resource strain: Not on file    Food insecurity     Worry: Not on file     Inability: Not on file   [employer] needs     Medical: Not on file     Non-medical: Not on file   Tobacco Use    Smoking status: Never Smoker    Smokeless tobacco: Never Used   Substance and Sexual Activity    Alcohol use: No    Drug use: No    Sexual activity: Yes   Lifestyle    Physical activity     Days per week: Not on file     Minutes per session: Not on file    Stress: Not on file   Relationships    Social connections     Talks on phone: Not on file     Gets together: Not on file     Attends Confucianism service: Not on file     Active member of club or organization: Not on file     Attends meetings of clubs or organizations: Not on file     Relationship status: Not on file    Intimate partner violence     Fear of current or ex partner: Not on file     Emotionally abused: Not on file     Physically abused: Not on file     Forced sexual activity: Not on file   Other Topics Concern    Not on file   Social History Narrative    Not on file           ROS:  [x] All negative/unchanged except if checked.  Explain positive(checked items) below:  [] Constitutional  [] Eyes  [] Ear/Nose/Mouth/Throat  [] Respiratory  [] CV  [] GI  []   [] Musculoskeletal  [] Skin/Breast  [] Neurological  [] Endocrine  [] Heme/Lymph  [] Allergic/Immunologic    Explanation:     MEDICATIONS:    Current Facility-Administered Medications:     amLODIPine (NORVASC) tablet 5 mg, 5 mg, Oral, Daily, NATHALIE Quezada NP, 5 mg at 04/17/21 1032    lisinopril (PRINIVIL;ZESTRIL) tablet 5 mg, 5 mg, Oral, Daily, Alice LEONARD Eleno Arm - NP, 5 mg at 04/17/21 1033    acetaminophen (TYLENOL) tablet 650 mg, 650 mg, Oral, Q4H PRN, Tammy Riedel, MD    magnesium hydroxide (MILK OF MAGNESIA) 400 MG/5ML suspension 30 mL, 30 mL, Oral, Daily PRN, Tammy Riedel, MD    aluminum & magnesium hydroxide-simethicone (MAALOX) 200-200-20 MG/5ML suspension 30 mL, 30 mL, Oral, PRN, Tammy Riedel, MD    haloperidol lactate (HALDOL) injection 3 mg, 3 mg, Intramuscular, Q6H PRN **OR** haloperidol (HALDOL) tablet 3 mg, 3 mg, Oral, Q6H PRN, Tammy Riedel, MD    traZODone (DESYREL) tablet 25 mg, 25 mg, Oral, Nightly PRN, Tammy Riedel, MD    [Held by provider] carvedilol (COREG) tablet 6.25 mg, 6.25 mg, Oral, BID, NATHALIE Rawls CNP, Stopped at 04/15/21 2113    vitamin D (CHOLECALCIFEROL) tablet 5,000 Units, 5,000 Units, Oral, Daily, NATHALIE Rawls CNP, 5,000 Units at 04/17/21 0920    levothyroxine (SYNTHROID) tablet 75 mcg, 75 mcg, Oral, [facility], NATHALIE Rawls CNP, 75 mcg at 04/17/21 9672    multivitamin 1 tablet, 1 tablet, Oral, Daily, NATHALIE Rawls CNP, 1 tablet at 04/17/21 0920    potassium chloride (KLOR-CON M) extended release tablet 10 mEq, 10 mEq, Oral, BID, NATHALIE Rawls CNP, 10 mEq at 04/17/21 0920    pantoprazole (PROTONIX) tablet 40 mg, 40 mg, Oral, [facility], NATHALIE Rawls CNP, 40 mg at 04/17/21 0647    OLANZapine (ZYPREXA) tablet 2.5 mg, 2.5 mg, Oral, BID, NATHALIE Rawls CNP, 2.5 mg at 04/17/21 0920    fluvoxaMINE (LUVOX) tablet 50 mg, 50 mg, Oral, BID, NATHALIE Rawls CNP, 50 mg at 04/17/21 9074    divalproex (DEPAKOTE SPRINKLE) capsule 125 mg, 125 mg, Oral, 2 times per day, NATHALIE Rawls CNP, 125 mg at 04/17/21 0920    melatonin tablet 3 mg, 3 mg, Oral, Daily, NATHALIE Rawls CNP, 3 mg at 04/16/21 1727      Examination:  /69   Pulse 64   Temp 96.9 °F (36.1 °C) (Temporal)   Resp 14   Ht 6' 3\" (1.905 m)   Wt 160 lb (72.6 kg) SpO2 97%   BMI 20.00 kg/m²   Gait - steady  Medication side effects(SE): none reported  Mental Status Examination:    Level of consciousness:  within normal limits   Appearance:  fair grooming and fair hygiene  Behavior/Motor:  psychomotor retardation  Attitude toward examiner:  cooperative  Speech:  slow, increased latency and monotone   Mood: constricted  Affect:  blunted  Thought processes:  illogical   Thought content: pleasantly confused  Cognition:  Self and year    Concentration poor  Insight poor   Judgement poor     ASSESSMENT:   Patient symptoms are:  [] Well controlled  [] Improving  [] Worsening  [x] No change      Diagnosis:   Principal Problem:    Acute psychosis ([facility] [address]  Resolved Problems:    * No resolved hospital problems. *      LABS:    Recent Labs     04/14/21  1343   WBC 4.9   HGB 10.9*        Recent Labs     04/14/21  1343 04/16/21  0517    142   K 4.3 4.4   * 111*   CO2 21* 20*   BUN 27* 26*   CREATININE 1.3* 1.3*   GLUCOSE 82 80     Recent Labs     04/14/21  1343   BILITOT <0.2   ALKPHOS 57   AST 22   ALT 24     Lab Results   Component Value Date    LABAMPH NOT DETECTED 04/14/2021    BARBSCNU NOT DETECTED 04/14/2021    LABBENZ NOT DETECTED 04/14/2021    LABMETH NOT DETECTED 04/14/2021    OPIATESCREENURINE NOT DETECTED 04/14/2021    PHENCYCLIDINESCREENURINE NOT DETECTED 04/14/2021    ETOH <10 04/14/2021     Lab Results   Component Value Date    TSH 2.870 04/14/2021     No results found for: LITHIUM  No results found for: VALPROATE, CBMZ          Treatment Plan:  The patient's diagnosis, treatment plan, medication management were formulated after patient was seen directly by the attending physician and myself and all relevant documentation was reviewed. Reviewed current Medications with the patient. Risk, benefit, side effects, possible outcomes of the medication and alternatives discussed with the patient and the patient demonstrated understanding.   The patient was also educated that the outcome of treatment will depend on the medication compliance as directed by the prescribers along with regular follow-up, compliance with the labs and other work-up, as clinically indicated. 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   Discharge planning discussed with the patient and treatment team.    PSYCHOTHERAPY/COUNSELING:  [x] Therapeutic interview  [x] Supportive  [] CBT  [] Ongoing  [] Other    [x] Patient continues to need, on a daily basis, active treatment furnished directly by or requiring the supervision of inpatient psychiatric personnel      Anticipated Length of stay: 7 - 10 days based on stability          Electronically signed by NATHALIE Love CNP on 6/41/4888 at 12:19 PM

## 2021-04-17 NOTE — PROGRESS NOTES
Pt refuses CPAP states he does not use his one at home since his bariatric surgery and does not feel like he needs to wear it while in the hospital.

## 2021-04-18 LAB
ANION GAP SERPL CALCULATED.3IONS-SCNC: 8 MMOL/L (ref 7–16)
BASOPHILS ABSOLUTE: 0.03 E9/L (ref 0–0.2)
BASOPHILS RELATIVE PERCENT: 1 % (ref 0–2)
BUN BLDV-MCNC: 22 MG/DL (ref 8–23)
CALCIUM SERPL-MCNC: 9.3 MG/DL (ref 8.6–10.2)
CHLORIDE BLD-SCNC: 109 MMOL/L (ref 98–107)
CO2: 24 MMOL/L (ref 22–29)
CREAT SERPL-MCNC: 1.3 MG/DL (ref 0.7–1.2)
EOSINOPHILS ABSOLUTE: 0.09 E9/L (ref 0.05–0.5)
EOSINOPHILS RELATIVE PERCENT: 3 % (ref 0–6)
GFR AFRICAN AMERICAN: >60
GFR NON-AFRICAN AMERICAN: 55 ML/MIN/1.73
GLUCOSE BLD-MCNC: 81 MG/DL (ref 74–99)
HCT VFR BLD CALC: 36.3 % (ref 37–54)
HEMOGLOBIN: 11.4 G/DL (ref 12.5–16.5)
IMMATURE GRANULOCYTES #: 0.01 E9/L
IMMATURE GRANULOCYTES %: 0.3 % (ref 0–5)
LYMPHOCYTES ABSOLUTE: 1.29 E9/L (ref 1.5–4)
LYMPHOCYTES RELATIVE PERCENT: 42.3 % (ref 20–42)
MCH RBC QN AUTO: 29.8 PG (ref 26–35)
MCHC RBC AUTO-ENTMCNC: 31.4 % (ref 32–34.5)
MCV RBC AUTO: 94.8 FL (ref 80–99.9)
MONOCYTES ABSOLUTE: 0.27 E9/L (ref 0.1–0.95)
MONOCYTES RELATIVE PERCENT: 8.9 % (ref 2–12)
NEUTROPHILS ABSOLUTE: 1.36 E9/L (ref 1.8–7.3)
NEUTROPHILS RELATIVE PERCENT: 44.5 % (ref 43–80)
PDW BLD-RTO: 14.4 FL (ref 11.5–15)
PLATELET # BLD: 285 E9/L (ref 130–450)
PMV BLD AUTO: 9.3 FL (ref 7–12)
POTASSIUM SERPL-SCNC: 4.6 MMOL/L (ref 3.5–5)
RBC # BLD: 3.83 E12/L (ref 3.8–5.8)
SODIUM BLD-SCNC: 141 MMOL/L (ref 132–146)
WBC # BLD: 3.1 E9/L (ref 4.5–11.5)

## 2021-04-18 PROCEDURE — 85025 COMPLETE CBC W/AUTO DIFF WBC: CPT

## 2021-04-18 PROCEDURE — 80048 BASIC METABOLIC PNL TOTAL CA: CPT

## 2021-04-18 PROCEDURE — 36415 COLL VENOUS BLD VENIPUNCTURE: CPT

## 2021-04-18 PROCEDURE — 6370000000 HC RX 637 (ALT 250 FOR IP): Performed by: PSYCHIATRY & NEUROLOGY

## 2021-04-18 PROCEDURE — 94660 CPAP INITIATION&MGMT: CPT

## 2021-04-18 PROCEDURE — 1240000000 HC EMOTIONAL WELLNESS R&B

## 2021-04-18 PROCEDURE — 6370000000 HC RX 637 (ALT 250 FOR IP): Performed by: NURSE PRACTITIONER

## 2021-04-18 PROCEDURE — 99232 SBSQ HOSP IP/OBS MODERATE 35: CPT | Performed by: PSYCHIATRY & NEUROLOGY

## 2021-04-18 RX ORDER — CYPROHEPTADINE HYDROCHLORIDE 4 MG/1
4 TABLET ORAL 2 TIMES DAILY
Status: DISCONTINUED | OUTPATIENT
Start: 2021-04-18 | End: 2021-04-20 | Stop reason: HOSPADM

## 2021-04-18 RX ORDER — GAUZE BANDAGE 2" X 2"
100 BANDAGE TOPICAL DAILY
Status: DISCONTINUED | OUTPATIENT
Start: 2021-04-18 | End: 2021-04-20 | Stop reason: HOSPADM

## 2021-04-18 RX ORDER — DIVALPROEX SODIUM 125 MG/1
250 CAPSULE, COATED PELLETS ORAL EVERY 12 HOURS SCHEDULED
Status: DISCONTINUED | OUTPATIENT
Start: 2021-04-18 | End: 2021-04-19

## 2021-04-18 RX ORDER — LISINOPRIL 5 MG/1
2.5 TABLET ORAL DAILY
Status: DISCONTINUED | OUTPATIENT
Start: 2021-04-18 | End: 2021-04-18

## 2021-04-18 RX ORDER — AMLODIPINE BESYLATE 2.5 MG/1
2.5 TABLET ORAL DAILY
Status: DISCONTINUED | OUTPATIENT
Start: 2021-04-18 | End: 2021-04-18

## 2021-04-18 RX ADMIN — DIVALPROEX SODIUM 250 MG: 125 CAPSULE, COATED PELLETS ORAL at 21:28

## 2021-04-18 RX ADMIN — Medication 1 TABLET: at 09:30

## 2021-04-18 RX ADMIN — Medication 100 MG: at 15:43

## 2021-04-18 RX ADMIN — LEVOTHYROXINE SODIUM 75 MCG: 0.07 TABLET ORAL at 06:45

## 2021-04-18 RX ADMIN — FLUVOXAMINE MALEATE 50 MG: 50 TABLET, COATED ORAL at 09:30

## 2021-04-18 RX ADMIN — OLANZAPINE 2.5 MG: 2.5 TABLET, FILM COATED ORAL at 21:23

## 2021-04-18 RX ADMIN — CYPROHEPTADINE HYDROCHLORIDE 4 MG: 4 TABLET ORAL at 15:43

## 2021-04-18 RX ADMIN — FLUVOXAMINE MALEATE 50 MG: 50 TABLET, COATED ORAL at 21:24

## 2021-04-18 RX ADMIN — OLANZAPINE 2.5 MG: 2.5 TABLET, FILM COATED ORAL at 09:30

## 2021-04-18 RX ADMIN — DIVALPROEX SODIUM 125 MG: 125 CAPSULE, COATED PELLETS ORAL at 09:30

## 2021-04-18 RX ADMIN — CYPROHEPTADINE HYDROCHLORIDE 4 MG: 4 TABLET ORAL at 21:24

## 2021-04-18 RX ADMIN — TRAZODONE HYDROCHLORIDE 25 MG: 50 TABLET ORAL at 21:23

## 2021-04-18 RX ADMIN — Medication 5000 UNITS: at 09:30

## 2021-04-18 RX ADMIN — PANTOPRAZOLE SODIUM 40 MG: 40 TABLET, DELAYED RELEASE ORAL at 06:45

## 2021-04-18 RX ADMIN — Medication 3 MG: at 17:22

## 2021-04-18 RX ADMIN — POTASSIUM CHLORIDE 10 MEQ: 750 TABLET, EXTENDED RELEASE ORAL at 21:24

## 2021-04-18 RX ADMIN — POTASSIUM CHLORIDE 10 MEQ: 750 TABLET, EXTENDED RELEASE ORAL at 09:30

## 2021-04-18 NOTE — PROGRESS NOTES
Hospitalist Progress Note      Chart reviewed. EP input noted. Borderline hypotensive since starting amlodipine and lisinopril. Will stop and monitor for now. Please feel free to reach out if patient becomes hypertensive. Non-billable rounding. Thanks for allowing us to participate in the care of Colby Franklin. We will sign off.  Please do not hesitate to contact us if needed.  +++++++++++++++++++++++++++++++++++++++++++++++++  TETE Padilla/ Azar Larkin , [place]

## 2021-04-18 NOTE — PROGRESS NOTES
Patient is out on the unit  Watching television. Patient ate two sherbet and 2 applesauce's. Patient is very soft spoken this evening . Confused , patient does not recall what occurred today he just smiles and says no. Patient denies SI,HI and hallucinations. Patient denies depression and anxiety.  Will continue to monitor and support

## 2021-04-18 NOTE — PROGRESS NOTES
Pt denies SI HI and hallucinations. Pt is quiet. Heart rate continues to be low, Sound notified. Orders to hold BP meds. Pt does not appear to be in any distress. Pt continues to be picky about his food. Pt is medication compliant. Pt is flat, appears sad but denies depression or anxiety. No voiced paranoia or delusions. We will continue to provide support and comfort for the patient.

## 2021-04-18 NOTE — CONSULTS
[facility]  CARDIAC ELECTROPHYSIOLOGY DEPARTMENT/DIVISION OF CARDIOLOGY  Consultation Report  PATIENT: Geovanni Sampson  MEDICAL RECORD NUMBER: [id number]  DATE OF SERVICE:  4/17/2021  ATTENDING ELECTROPHYSIOLOGIST:  Pipo Dempsey DO  REFERRING PHYSICIAN: No ref. provider found and Curlene Means  CHIEF COMPLAINT: bradycardia    HPI: This is a 76 y.o. male with a history of nonvalvular paroxysmal atrial fibrillation sp DCCV (5/17/16, 7/18/16, 9/8/16), manic bipolar disorder, gastric bypass (6/24/19), ABENA-resolved with weight loss, GERD, cyclical vomiting, and vitamin B12 deficiency. Atrial fibrillation initially diagnosed in 2/2016. He went from weight of more than 360 lbs to 190 lbs after bariatric surgery. He is managed by [facility] EP Gerri Sotomayor MD). His home medications include coreg 6.25 mg every 12 hours and Xarelto 20 mg daily. He was previously on dofetilide, but discontinued in 2/2021 after significant weight loss and no recurrence. He presented on 4/15/21 with chief complaint of jluis. Altered behavior has progressively worsened over the past 3 weeks and began shortly after 2nd COVID-19 vaccine dose. He was noted to be bradycardic. His home coreg was held. EP service is now consulted by admitting physician for management of bradycardia. Patient is a poor historian. History obtained via chart review. Patient is resting comfortably in bed with HR ~35 bpm. He denies any complaints at this time. Prior cardiac testing:  · ECG (4/17/21): sinus bradycardia at 48 bpm, 1* AV block (Natasha = 214 msec), LAFB (QRSd = 102 msec), and QTc = 405 msc. · ECG (4/16/21): sinus bradycardia at 31 bpm, 1* AV block (Natasha = 212 msec), LAFB (QRSd = 102 msec), QTc = 385 msec. · ECG (4/15/21): sinus bradycardia at 50 bpm, LAFB (QRSd = 100 msec), QTc = 448 msec. · ECG (2/20/21): sinus bradycardia at 53 bpm, iRBBB, LAFB (QRSd = 96 msec).   · Pharmacologic Nuclear Stress Test (4/12/19): LVEF = 70%, normal perfusion. · 24 hour Holter (5/5/16): atrial fibrillation with ventricular rate of 44 - 143 bpm (mean: 81 bpm), no significant pauses, rare PVCs.     Past Medical History:   Diagnosis Date    Arthritis     Atrial fibrillation (HCC)     Back pain     CPAP (continuous positive airway pressure) dependence     setting 15    Difficulty swallowing     DJD (degenerative joint disease)     GERD (gastroesophageal reflux disease)     High cholesterol     Hx of cardiovascular stress test 04/12/2019    Lexiscan stress test    Knee pain     Morbid obesity due to excess calories ([address]     Non-stress test nonreactive     2011    ABENA (obstructive sleep apnea) 6/25/2019    PONV (postoperative nausea and vomiting)     Problems with hearing     Sleep apnea     SOBOE (shortness of breath on exertion)     Thyroid disease      Past Surgical History:   Procedure Laterality Date    APPENDECTOMY      BACK SURGERY      laminectomy     CARDIOVERSION      2 years ago dr [facility]    CHOLECYSTECTOMY      COLONOSCOPY      COLONOSCOPY  05/31/2019    COLONOSCOPY N/A 5/31/2019    COLONOSCOPY performed by Rosanna Begum MD at [facility] ARTHROSCOPY Bilateral 1980's    KNEE SURGERY      both knees- scopes rt x2 left x 1    KARINA-EN-Y GASTRIC BYPASS N/A 6/24/2019    GASTRIC BYPASS KARINA-EN-Y LAPAROSCOPIC performed by Rosanna Begum MD at [address] ENDOSCOPY  03/08/2019    UPPER GASTROINTESTINAL ENDOSCOPY N/A 3/8/2019    EGD BIOPSY performed by Rosanna Begum MD at [address] N/A 8/30/2019    EGD BIOPSY performed by Rosanna Begum MD at [address] [facility] History   Problem Relation Age of Onset    Dementia Mother     Heart Disease Father     No Known Problems Sister     No Known Problems Brother     Cancer Maternal Grandmother     Diabetes Maternal Grandfather     Heart Attack Paternal Grandmother     Heart Attack Paternal Grandfather      There is no family history of sudden cardiac arrest    Social History     Tobacco Use    Smoking status: Never Smoker    Smokeless tobacco: Never Used   Substance Use Topics    Alcohol use: No       Current Facility-Administered Medications   Medication Dose Route Frequency Provider Last Rate Last Admin    amLODIPine (NORVASC) tablet 5 mg  5 mg Oral Daily Justen Cotter APRN - NP   5 mg at 04/17/21 1032    lisinopril (PRINIVIL;ZESTRIL) tablet 5 mg  5 mg Oral Daily Justen Cotter APRN - NP   5 mg at 04/17/21 1033    acetaminophen (TYLENOL) tablet 650 mg  650 mg Oral Q4H PRN Herberth Seymour MD        magnesium hydroxide (MILK OF MAGNESIA) 400 MG/5ML suspension 30 mL  30 mL Oral Daily PRN Herberth Seymour MD        aluminum & magnesium hydroxide-simethicone (MAALOX) 200-200-20 MG/5ML suspension 30 mL  30 mL Oral PRN Herberth Seymour MD        haloperidol lactate (HALDOL) injection 3 mg  3 mg Intramuscular Q6H PRN Herberth Seymour MD        Or    haloperidol (HALDOL) tablet 3 mg  3 mg Oral Q6H PRN Herberth Seymour MD        traZODone (DESYREL) tablet 25 mg  25 mg Oral Nightly PRN Herberth Seymour MD        [Held by provider] carvedilol (COREG) tablet 6.25 mg  6.25 mg Oral BID Sandy Gale, APRN - CNP   Stopped at 04/15/21 2113    vitamin D (CHOLECALCIFEROL) tablet 5,000 Units  5,000 Units Oral Daily Sandy Gale, APRN - CNP   5,000 Units at 04/17/21 0920    levothyroxine (SYNTHROID) tablet 75 mcg  75 mcg Oral QAM AC Sandy Gale, APRN - CNP   75 mcg at 04/17/21 6735    multivitamin 1 tablet  1 tablet Oral Daily Sandy Gale, APRN - CNP   1 tablet at 04/17/21 0920    potassium chloride (KLOR-CON M) extended release tablet 10 mEq  10 mEq Oral BID Sandy Gale, APRN - CNP   10 mEq at 04/17/21 0920    pantoprazole (PROTONIX) tablet 40 mg  40 mg Oral QAM AC Sandy Gale, APRN - CNP   40 mg at 04/17/21 0647    OLANZapine (Miguel A Kim) tablet 2.5 mg  2.5 mg Oral BID Frances Donny, APRN - CNP   2.5 mg at 04/17/21 0920    fluvoxaMINE (LUVOX) tablet 50 mg  50 mg Oral BID Frances Hines, APRN - CNP   50 mg at 04/17/21 0895    divalproex (DEPAKOTE SPRINKLE) capsule 125 mg  125 mg Oral 2 times per day Frances Hines, APRN - CNP   125 mg at 04/17/21 0920    melatonin tablet 3 mg  3 mg Oral Daily Frances Hines, APRN - CNP   3 mg at 04/17/21 1746      Allergies   Allergen Reactions    Atorvastatin Calcium Other (See Comments)     myalgias    Lactose Diarrhea     ROS:   Constitutional: Negative for fever, activity change and appetite change. HENT: Negative for epistaxis. Eyes: Negative for diploplia, blurred vision. Respiratory: Negative for cough, chest tightness, shortness of breath and wheezing. Cardiovascular: pertinent positives in HPI  Gastrointestinal: Negative for abdominal pain and blood in stool. Genitourinary: Negative for hematuria and difficulty urinating. Musculoskeletal: Negative for myalgias and gait problem. Skin: Negative for color change and rash. Neurological: Negative for syncope and light-headedness. Psychiatric/Behavioral: Negative for confusion and agitation. The patient is not nervous/anxious. Heme: no bleeding disorders, no melena or hematochezia  All other review of systems are negative     PHYSICAL EXAM:  Vitals:    04/16/21 2042 04/17/21 0800 04/17/21 1032 04/17/21 2139   BP: (!) 179/98  105/69 (!) 99/58   Pulse: 64 (!) 48  (!) 48   Resp: 14 16  16   Temp: 96.9 °F (36.1 °C) 97.9 °F (36.6 °C)  97.5 °F (36.4 °C)   TempSrc: Temporal Temporal  Temporal   SpO2: 97% 100%  100%   Weight:       Height:       Limited exam due to COVID-19 pandemic. Constitutional: NAD  Head: Normocephalic and atraumatic.    Neck: supple and no JVD present  Cardiovascular: regular, bradycadic  Pulmonary/Chest:  No respiratory distress, no audible wheeze  Abdominal: nondistended   Musculoskeletal: Normal range of motion of all extremities  Neurological: A&O x 2 (person, time), otherwise grossly intact   Skin: Skin is warm and dry, CIED incision C/D/I without erythema, edema, or drainage  Extremity: no edema   Psychiatric: Normal mood and affect, A&O x2    Data:    No results for input(s): WBC, HGB, HCT, PLT in the last 72 hours. Recent Labs     04/16/21  0517      K 4.4   *   CO2 20*   BUN 26*   CREATININE 1.3*   CALCIUM 8.6     No results for input(s): INR in the last 72 hours. No results for input(s): TSH in the last 72 hours. Lab Results   Component Value Date    MG 1.9 04/16/2021      Assessment/Plan:  1. Asymptomatic Sinus Bradycardia  -On ECG prior to admit, patient had sinus bradycardia. Patient's with gastric bypass can have sinus bradycardia due to impact on enterocardiac axis. This condition is, in most cases, asymptomatic and does not require any treatment intervention.  -Patient's resting bradycardia rate further decreased after starting multiple psych medications, which can cause sinus bradycardia.  -Patient is asymptomatic. No indication for pacemaker. -EP Service will sign off. Please contact with questions/concerns. He can follow-up with established EP at [facility]. I have spent a total of 60 minutes with the patient and his/her family reviewing the above stated recommendations. And a total of >50% of that time involved face-to-face time providing counseling and or coordination of care with the other providers. Thank you for allowing me to participate in your patient's care. Please call me if there are any questions. Kalpesh Kebede D.O.   Cardiac Electrophysiology  [address] [place] [address]

## 2021-04-18 NOTE — PROGRESS NOTES
BEHAVIORAL HEALTH FOLLOW-UP NOTE     4/18/2021     Patient was seen and examined in person, Chart reviewed   Patient's case discussed with staff/team    Chief Complaint: \" I am fine\"       Interim History:     Mr. Ajay Feldman is barely verbal, slowed, intermittent poverty of speech, not eating or drinking, he had one ice cream yesterday. He is severely psychomotor retarded. His wife relayed that he was fine until he got the second Covid shot that he became this way. His heart rate is low manual palpation 31. Sound is monitoring along with cardiology who saw him yesterday. He appears emaciated. He is practically mute. He would not engage.        Appetite:   [] Normal/Unchanged  [] Increased  [x] Decreased      Sleep:       [] Normal/Unchanged  [x] Fair       [] Poor              Energy:    [] Normal/Unchanged  [] Increased  [x] Decreased        SI [] Present  [x] Absent    HI  []Present  [x] Absent     Aggression:  [] yes  [x] no    Patient is [x] able  [] unable to CONTRACT FOR SAFETY     PAST MEDICAL/PSYCHIATRIC HISTORY:   Past Medical History:   Diagnosis Date    Arthritis     Atrial fibrillation (HCC)     Back pain     CPAP (continuous positive airway pressure) dependence     setting 15    Difficulty swallowing     DJD (degenerative joint disease)     GERD (gastroesophageal reflux disease)     High cholesterol     Hx of cardiovascular stress test 04/12/2019    Lexiscan stress test    Knee pain     Morbid obesity due to excess calories ([address]     Non-stress test nonreactive     2011    ABENA (obstructive sleep apnea) 6/25/2019    PONV (postoperative nausea and vomiting)     Problems with hearing     Sleep apnea     SOBOE (shortness of breath on exertion)     Thyroid disease        FAMILY/SOCIAL HISTORY:  Family History   Problem Relation Age of Onset    Dementia Mother     Heart Disease Father     No Known Problems Sister     No Known Problems Brother     Cancer Maternal Grandmother     Diabetes 2.5 mg, Oral, Daily, Alice D Harpreet Jose Martin, APRN - NP    acetaminophen (TYLENOL) tablet 650 mg, 650 mg, Oral, Q4H PRN, Herberth Seymour MD    magnesium hydroxide (MILK OF MAGNESIA) 400 MG/5ML suspension 30 mL, 30 mL, Oral, Daily PRN, Herberth Seymour MD    aluminum & magnesium hydroxide-simethicone (MAALOX) 200-200-20 MG/5ML suspension 30 mL, 30 mL, Oral, PRN, Herberth Seymour MD    haloperidol lactate (HALDOL) injection 3 mg, 3 mg, Intramuscular, Q6H PRN **OR** haloperidol (HALDOL) tablet 3 mg, 3 mg, Oral, Q6H PRN, Herberth Seymour MD    traZODone (DESYREL) tablet 25 mg, 25 mg, Oral, Nightly PRN, Herberth Seymour MD, 25 mg at 04/17/21 2203    vitamin D (CHOLECALCIFEROL) tablet 5,000 Units, 5,000 Units, Oral, Daily, Sandy Maharaj APRN - CNP, 5,000 Units at 04/18/21 0930    levothyroxine (SYNTHROID) tablet 75 mcg, 75 mcg, Oral, QAM AC, Sandy Maharaj, APRN - CNP, 75 mcg at 04/18/21 0645    multivitamin 1 tablet, 1 tablet, Oral, Daily, Sandy Maharaj APRN - CNP, 1 tablet at 04/18/21 0930    potassium chloride (KLOR-CON M) extended release tablet 10 mEq, 10 mEq, Oral, BID, Sandy Maharaj APRN - CNP, 10 mEq at 04/18/21 0930    pantoprazole (PROTONIX) tablet 40 mg, 40 mg, Oral, QAM AC, Sandy Claudee, APRN - CNP, 40 mg at 04/18/21 0645    OLANZapine (ZYPREXA) tablet 2.5 mg, 2.5 mg, Oral, BID, Sandy Claudee, APRN - CNP, 2.5 mg at 04/18/21 0930    fluvoxaMINE (LUVOX) tablet 50 mg, 50 mg, Oral, BID, Sandy Claudee, APRN - CNP, 50 mg at 04/18/21 0930    divalproex (DEPAKOTE SPRINKLE) capsule 125 mg, 125 mg, Oral, 2 times per day, NATHALIE Chatterjee CNP, 125 mg at 04/18/21 0930    melatonin tablet 3 mg, 3 mg, Oral, Daily, NATHALIE Chatterjee CNP, 3 mg at 04/17/21 1746      Examination:  BP (!) 100/59   Pulse (!) 35   Temp 96.9 °F (36.1 °C) (Temporal)   Resp 16   Ht 6' 3\" (1.905 m)   Wt 160 lb (72.6 kg)   SpO2 95%   BMI 20.00 kg/m²   Gait - steady but psychomotor retarded, wide base, difficulty initiating activity  Medication side effects(SE): none    Mental Status Examination:    Level of consciousness:  within normal limits   Appearance:  poor grooming and good hygiene  Behavior/Motor:  psychomotor retardation  Attitude toward examiner:  guarded and withdrawn  Speech:  low productivity   Mood: depressed and anxious  Affect:  mood congruent and anxious  Thought processes:  poverty of thought   Thought content:  Obsessions/instrusive thoughts:  Regarding eating  Compulsions: not to eat  Homocidal ideation denied  Suicidal Ideation:  denies suicidal ideation  Delusions:  paranoid  Perceptual Disturbance:  auditory  Cognition:  oriented to person, place, and time   Concentration intact  Insight poor   Judgement poor     ASSESSMENT: Psychosis, catatonic, Anorexia nervosa may be caused by compulsions    Patient symptoms are:  [] Well controlled  [] Improving  [x] Worsening  [] No change      Diagnosis: Psychosis, catatonic treatment complicated by bradycardia heart rate 31, cannot use benzodiazepines    Principal Problem:    Acute psychosis ([facility] [address]  Resolved Problems:    * No resolved hospital problems. *      LABS:    No results for input(s): WBC, HGB, PLT in the last 72 hours. Recent Labs     04/16/21  0517 04/18/21  0818    141   K 4.4 4.6   * 109*   CO2 20* 24   BUN 26* 22   CREATININE 1.3* 1.3*   GLUCOSE 80 81     No results for input(s): BILITOT, ALKPHOS, AST, ALT in the last 72 hours.   Lab Results   Component Value Date    LABAMPH NOT DETECTED 04/14/2021    BARBSCNU NOT DETECTED 04/14/2021    LABBENZ NOT DETECTED 04/14/2021    LABMETH NOT DETECTED 04/14/2021    OPIATESCREENURINE NOT DETECTED 04/14/2021    PHENCYCLIDINESCREENURINE NOT DETECTED 04/14/2021    ETOH <10 04/14/2021     Lab Results   Component Value Date    TSH 2.870 04/14/2021     No results found for: LITHIUM  No results found for: VALPROATE, CBMZ    RISK ASSESSMENT: Remains high risk   Treatment Plan:  Reviewed current Medications with the patient. Continue zyprexa 2.5mg po bid  Increase depakote to 250mg po bid, cannot increase anything else due to HR  Continue luvox 50mg bid  Melatonin 3mg at 1800 for sleep quality  Multivitamin one daily  Thiamine 100mg po daily worry about refeeding syndrome    Risks, benefits, side effects, drug-to-drug interactions and alternatives to treatment were discussed. Collateral information: wife  CD evaluation  Encourage patient to attend group and other milieu activities.   Discharge planning discussed with the patient and treatment team.    PSYCHOTHERAPY/COUNSELING:  [x] Therapeutic interview  [x] Supportive  [] CBT  [] Ongoing  [] Other    [x] Patient continues to need, on a daily basis, active treatment furnished directly by or requiring the supervision of inpatient psychiatric personnel      Anticipated Length of stay  7-10 days            Electronically signed by Jua nDavid Cabrera MD on 4/18/2021 at 11:40 AM

## 2021-04-19 ENCOUNTER — APPOINTMENT (OUTPATIENT)
Dept: MRI IMAGING | Age: 66
DRG: 885 | End: 2021-04-19
Payer: MEDICARE

## 2021-04-19 VITALS
DIASTOLIC BLOOD PRESSURE: 72 MMHG | TEMPERATURE: 98 F | HEIGHT: 75 IN | HEART RATE: 42 BPM | WEIGHT: 152.4 LBS | OXYGEN SATURATION: 99 % | SYSTOLIC BLOOD PRESSURE: 136 MMHG | BODY MASS INDEX: 18.95 KG/M2 | RESPIRATION RATE: 16 BRPM

## 2021-04-19 PROBLEM — N17.9 AKI (ACUTE KIDNEY INJURY) (HCC): Status: ACTIVE | Noted: 2021-04-19

## 2021-04-19 LAB
ALBUMIN SERPL-MCNC: 4.3 G/DL (ref 3.5–5.2)
ALP BLD-CCNC: 70 U/L (ref 40–129)
ALT SERPL-CCNC: 26 U/L (ref 0–40)
AMMONIA: 16 UMOL/L (ref 16–60)
ANION GAP SERPL CALCULATED.3IONS-SCNC: 13 MMOL/L (ref 7–16)
ANION GAP SERPL CALCULATED.3IONS-SCNC: 13 MMOL/L (ref 7–16)
AST SERPL-CCNC: 22 U/L (ref 0–39)
BILIRUB SERPL-MCNC: 0.3 MG/DL (ref 0–1.2)
BUN BLDV-MCNC: 25 MG/DL (ref 8–23)
BUN BLDV-MCNC: 28 MG/DL (ref 8–23)
CALCIUM SERPL-MCNC: 9.5 MG/DL (ref 8.6–10.2)
CALCIUM SERPL-MCNC: 9.6 MG/DL (ref 8.6–10.2)
CHLORIDE BLD-SCNC: 104 MMOL/L (ref 98–107)
CHLORIDE BLD-SCNC: 106 MMOL/L (ref 98–107)
CO2: 22 MMOL/L (ref 22–29)
CO2: 22 MMOL/L (ref 22–29)
CREAT SERPL-MCNC: 1.8 MG/DL (ref 0.7–1.2)
CREAT SERPL-MCNC: 1.9 MG/DL (ref 0.7–1.2)
GFR AFRICAN AMERICAN: 43
GFR AFRICAN AMERICAN: 46
GFR NON-AFRICAN AMERICAN: 36 ML/MIN/1.73
GFR NON-AFRICAN AMERICAN: 38 ML/MIN/1.73
GLUCOSE BLD-MCNC: 105 MG/DL (ref 74–99)
GLUCOSE BLD-MCNC: 133 MG/DL (ref 74–99)
POTASSIUM REFLEX MAGNESIUM: 5.3 MMOL/L (ref 3.5–5)
POTASSIUM SERPL-SCNC: 5.2 MMOL/L (ref 3.5–5)
SODIUM BLD-SCNC: 139 MMOL/L (ref 132–146)
SODIUM BLD-SCNC: 141 MMOL/L (ref 132–146)
TOTAL CK: 124 U/L (ref 20–200)
TOTAL PROTEIN: 8.4 G/DL (ref 6.4–8.3)

## 2021-04-19 PROCEDURE — 1240000000 HC EMOTIONAL WELLNESS R&B

## 2021-04-19 PROCEDURE — 6370000000 HC RX 637 (ALT 250 FOR IP): Performed by: NURSE PRACTITIONER

## 2021-04-19 PROCEDURE — 94660 CPAP INITIATION&MGMT: CPT

## 2021-04-19 PROCEDURE — 99221 1ST HOSP IP/OBS SF/LOW 40: CPT | Performed by: PSYCHIATRY & NEUROLOGY

## 2021-04-19 PROCEDURE — 36415 COLL VENOUS BLD VENIPUNCTURE: CPT

## 2021-04-19 PROCEDURE — 6370000000 HC RX 637 (ALT 250 FOR IP): Performed by: INTERNAL MEDICINE

## 2021-04-19 PROCEDURE — 80053 COMPREHEN METABOLIC PANEL: CPT

## 2021-04-19 PROCEDURE — 82550 ASSAY OF CK (CPK): CPT

## 2021-04-19 PROCEDURE — 2580000003 HC RX 258: Performed by: NURSE PRACTITIONER

## 2021-04-19 PROCEDURE — 2580000003 HC RX 258

## 2021-04-19 PROCEDURE — 80048 BASIC METABOLIC PNL TOTAL CA: CPT

## 2021-04-19 PROCEDURE — 70551 MRI BRAIN STEM W/O DYE: CPT

## 2021-04-19 PROCEDURE — 99231 SBSQ HOSP IP/OBS SF/LOW 25: CPT | Performed by: NURSE PRACTITIONER

## 2021-04-19 PROCEDURE — 82140 ASSAY OF AMMONIA: CPT

## 2021-04-19 PROCEDURE — 6370000000 HC RX 637 (ALT 250 FOR IP): Performed by: PSYCHIATRY & NEUROLOGY

## 2021-04-19 RX ORDER — 0.9 % SODIUM CHLORIDE 0.9 %
500 INTRAVENOUS SOLUTION INTRAVENOUS ONCE
Status: DISCONTINUED | OUTPATIENT
Start: 2021-04-19 | End: 2021-04-19

## 2021-04-19 RX ORDER — QUETIAPINE FUMARATE 25 MG/1
25 TABLET, FILM COATED ORAL 2 TIMES DAILY
COMMUNITY

## 2021-04-19 RX ORDER — SODIUM CHLORIDE 0.9 % (FLUSH) 0.9 %
SYRINGE (ML) INJECTION
Status: COMPLETED
Start: 2021-04-19 | End: 2021-04-19

## 2021-04-19 RX ORDER — MAGNESIUM OXIDE 400 MG/1
400 TABLET ORAL DAILY
COMMUNITY

## 2021-04-19 RX ORDER — 0.9 % SODIUM CHLORIDE 0.9 %
1000 INTRAVENOUS SOLUTION INTRAVENOUS ONCE
Status: COMPLETED | OUTPATIENT
Start: 2021-04-19 | End: 2021-04-19

## 2021-04-19 RX ADMIN — PANTOPRAZOLE SODIUM 40 MG: 40 TABLET, DELAYED RELEASE ORAL at 06:46

## 2021-04-19 RX ADMIN — MAGNESIUM GLUCONATE 500 MG ORAL TABLET 400 MG: 500 TABLET ORAL at 16:33

## 2021-04-19 RX ADMIN — Medication 3 MG: at 18:19

## 2021-04-19 RX ADMIN — FLUVOXAMINE MALEATE 50 MG: 50 TABLET, COATED ORAL at 10:14

## 2021-04-19 RX ADMIN — POTASSIUM CHLORIDE 10 MEQ: 750 TABLET, EXTENDED RELEASE ORAL at 10:13

## 2021-04-19 RX ADMIN — Medication 1 TABLET: at 10:14

## 2021-04-19 RX ADMIN — CYPROHEPTADINE HYDROCHLORIDE 4 MG: 4 TABLET ORAL at 21:57

## 2021-04-19 RX ADMIN — SODIUM CHLORIDE 1000 ML: 9 INJECTION, SOLUTION INTRAVENOUS at 15:41

## 2021-04-19 RX ADMIN — Medication: at 15:44

## 2021-04-19 RX ADMIN — Medication: at 14:44

## 2021-04-19 RX ADMIN — Medication 5000 UNITS: at 10:14

## 2021-04-19 RX ADMIN — DIVALPROEX SODIUM 250 MG: 125 CAPSULE, COATED PELLETS ORAL at 10:14

## 2021-04-19 RX ADMIN — LEVOTHYROXINE SODIUM 75 MCG: 0.07 TABLET ORAL at 06:47

## 2021-04-19 RX ADMIN — OLANZAPINE 2.5 MG: 2.5 TABLET, FILM COATED ORAL at 10:14

## 2021-04-19 RX ADMIN — Medication 100 MG: at 10:15

## 2021-04-19 RX ADMIN — RIVAROXABAN 20 MG: 20 TABLET, FILM COATED ORAL at 16:33

## 2021-04-19 RX ADMIN — CYPROHEPTADINE HYDROCHLORIDE 4 MG: 4 TABLET ORAL at 10:14

## 2021-04-19 ASSESSMENT — PAIN SCALES - GENERAL: PAINLEVEL_OUTOF10: 0

## 2021-04-19 NOTE — CONSULTS
Hospital Medicine  Consult History & Physical        Reason for consult:  ANJANA and hyperkalemia    Date of Service: Pt seen/examined in consultation on 4/19/2021    History Of Present Illness:    Mr. Timo Tena, a 76y.o. year old male  who  has a past medical history of Arthritis, Atrial fibrillation ([address] Back pain, CPAP (continuous positive airway pressure) dependence, Difficulty swallowing, DJD (degenerative joint disease), GERD (gastroesophageal reflux disease), High cholesterol, Hx of cardiovascular stress test, Knee pain, Morbid obesity due to excess calories ([address] Non-stress test nonreactive, ABENA (obstructive sleep apnea), PONV (postoperative nausea and vomiting), Problems with hearing, Sleep apnea, SOBOE (shortness of breath on exertion), and Thyroid disease. Patient presented to the ED on 4/14 with concerns for altered mental status. He has a psychiatric history. He was subsequently admitted to [facility] with diagnosis of acute psychosis. We were asked to see/evaluate the patient for chronic asymptomatic bradycardia on 4/19 at which time his Coreg was discontinued and EP was consulted who had no further recommendations. We are now being reconsulted for an ANJANA and hyperkalemia. It appears the patient was placed on 10 mEq of potassium chloride twice daily based on a home medication reconciliation that was not correct. Nursing states patient has not been eating or drinking since arrival.  He states that he has not been eating or drinking because he does not feel hungry. He denies any history of renal issues though he is a poor historian.     Past Medical History:        Diagnosis Date    Arthritis     Atrial fibrillation (HCC)     Back pain     CPAP (continuous positive airway pressure) dependence     setting 15    Difficulty swallowing     DJD (degenerative joint disease)     GERD (gastroesophageal reflux disease)     High cholesterol     Hx of cardiovascular stress test 04/12/2019 Lexiscan stress test    Knee pain     Morbid obesity due to excess calories ([address]     Non-stress test nonreactive     2011    ABENA (obstructive sleep apnea) 6/25/2019    PONV (postoperative nausea and vomiting)     Problems with hearing     Sleep apnea     SOBOE (shortness of breath on exertion)     Thyroid disease      Past Surgical History:        Procedure Laterality Date    APPENDECTOMY      BACK SURGERY      laminectomy     CARDIOVERSION      2 years ago dr [facility]    CHOLECYSTECTOMY      COLONOSCOPY      COLONOSCOPY  05/31/2019    COLONOSCOPY N/A 5/31/2019    COLONOSCOPY performed by Jigna Barry MD at [address] ARTHROSCOPY Bilateral 1980's    KNEE SURGERY      both knees- scopes rt x2 left x 1    KARINA-EN-Y GASTRIC BYPASS N/A 6/24/2019    GASTRIC BYPASS KARINA-EN-Y LAPAROSCOPIC performed by Jigna Barry MD at [address] ENDOSCOPY  03/08/2019    UPPER GASTROINTESTINAL ENDOSCOPY N/A 3/8/2019    EGD BIOPSY performed by Jigna Barry MD at [address] [facility] 8/30/2019    EGD BIOPSY performed by Jigna Barry MD at [place] [address]       Medications Prior to Admission:    Prior to Admission medications    Medication Sig Start Date End Date Taking? Authorizing Provider   QUEtiapine (SEROQUEL) 25 MG tablet Take 25 mg by mouth 2 times daily    Yes Historical Provider, MD   magnesium oxide (MAG-OX) 400 MG tablet Take 400 mg by mouth daily   Yes Historical Provider, MD   levothyroxine (SYNTHROID) 75 MCG tablet Take 75 mcg by mouth daily 10/2/17  Yes Historical Provider, MD   rivaroxaban (XARELTO) 20 MG TABS tablet Take 20 mg by mouth daily Last dose 5/29/2019 7/3/17  Yes Historical Provider, MD       Allergies:  Atorvastatin calcium and Lactose    Social History:      TOBACCO:   reports that he has never smoked.  He has never used smokeless tobacco.  ETOH:   reports no history of alcohol use.    Family History:        Problem Relation Age of Onset    Dementia Mother     Heart Disease Father     No Known Problems Sister     No Known Problems Brother     Cancer Maternal Grandmother     Diabetes Maternal Grandfather     Heart Attack Paternal Grandmother     Heart Attack Paternal Grandfather        REVIEW OF SYSTEMS:   Pertinent positives as noted in the HPI. All other systems reviewed and negative. PHYSICAL EXAM:  /71   Pulse 88   Temp 97.3 °F (36.3 °C) (Temporal)   Resp 16   Ht 6' 3\" (1.905 m)   Wt 152 lb 6.4 oz (69.1 kg)   SpO2 97%   BMI 19.05 kg/m²   General appearance: Elderly male in no apparent distress, appears stated age and cooperative. HEENT: Normal cephalic, atraumatic without obvious deformity. Pupils equal, round, and reactive to light. Extra ocular muscles intact. Conjunctivae/corneas clear. Neck: Supple, with full range of motion. No jugular venous distention. Trachea midline. Respiratory:  Clear to auscultation bilaterally. No apparent distress. Cardiovascular:  Regular rate and rhythm. S1, S2 without murmurs, rubs, or gallops. PV: Brisk capillary refill. +2 pedal and radial pulses bilaterally. No clubbing, cyanosis, edema of bilateral lower extremities. Abdomen: Soft, non-tender, non-distended. +BS  Musculoskeletal: No obvious deformities or erythematous or edematous joints. Skin: Normal skin color. No rashes or lesions. Poor skin turgor. Neurologic:  Neurovascularly intact without any focal sensory/motor deficits. Cranial nerves: II-XII intact, grossly non-focal.  Psychiatric: Alert and oriented. Slow to respond.      Labs:     CBC:   Recent Labs     04/18/21 0818   WBC 3.1*   RBC 3.83   HGB 11.4*   HCT 36.3*   MCV 94.8   RDW 14.4        BMP:   Recent Labs     04/18/21  0818 04/19/21  1015    139   K 4.6 5.3*   * 104   CO2 24 22   BUN 22 25*   CREATININE 1.3* 1.8*     LFT:  Recent Labs     04/19/21  1015   PROT 8.4*   ALKPHOS

## 2021-04-19 NOTE — PROGRESS NOTES
Patient has been out on the unit walking the warren. Shown patient to his room , he sat in bed briefly and came back out walking . Patient thinks he is at the [address] [place] [address]. Voices no questions or concerns offered a snack and patient refused.

## 2021-04-19 NOTE — PROGRESS NOTES
Patient requested to speak to this nurse. Patient asked where his wife was. This was discussed with patient and emotional support was provided. Patient then stated \"I keep seeing these things on the floor\" and then pointed to the floor in front of him. Patient was vague when asked to described what he was seeing. Patient soon after denied seeing anything when further asked. Patient reports that he needs to walk because he has to get back to his family. Patient encouraged to rest longer until his IV infusion is complete. Patient agreed. Will continue to monitor and support.

## 2021-04-19 NOTE — PLAN OF CARE
Problem: Altered Mood, Manic Behavior:  Goal: Ability to interact with others will improve  Description: Ability to interact with others will improve  Outcome: Ongoing  Goal: Mood stable  Description: Mood stable  Outcome: Ongoing     Patient's affect is flat. Speech is delayed. Patient is alert and oriented to person, place, and time. He denies suicidal ideations, homicidal ideations, and hallucinations. Patient is restless and continuously paces the hallways. He is medication compliant after much encouragement. Patient is declining meals despite encouragement. His behavior is in control.

## 2021-04-19 NOTE — CARE COORDINATION
Pt is bright and social with peers. Pt is medication compliant. Pt is not attending groups, despite being encouraged to do so. Pt's sleep is poor, pt slept 2 hrs last night. Pt's discharge plan is to return home with his significant other. Collateral gained from pt's significant other (See note 4/15) . Pt's appointments are scheduled at [facility]. Discharge date remains unknown.

## 2021-04-19 NOTE — PROGRESS NOTES
BEHAVIORAL HEALTH FOLLOW-UP NOTE     4/19/2021     Patient was seen and examined in person, Chart reviewed   Patient's case discussed with staff/team    Chief Complaint: \" I am fine\"       Interim History:    Mr. Reid remains barely verbal, slowed, intermittent poverty of speech, not eating or drinking, he had one ice cream yesterday. He is severely psychomotor retarded. His heart rate is low manual palpation 31 yesterday, and 36 this morning at 0400. His pulse is better now at 91 with BP 95/67. Sound is monitoring along with cardiology who saw him yesterday. We will consult neuro due to no patient improvement and no past psychiatric history except for events leading to hospitalization. He is worsening with treatment, all psychotropics are discontinued, as the patient's overall health is in question. The risk of utilizing psychotropic medications at this time does not out weigh the benefits, and may further compromise his health. He appears emaciated. He is practically mute. He is not able to engage for assessment. He wanders up and down the warren, he has no purposeful behaviors.       Appetite:   [] Normal/Unchanged  [] Increased  [x] Decreased      Sleep:       [] Normal/Unchanged  [x] Fair       [] Poor              Energy:    [] Normal/Unchanged  [] Increased  [x] Decreased        SI [] Present  [x] Absent    HI  []Present  [x] Absent     Aggression:  [] yes  [x] no    Patient is [x] able  [] unable to CONTRACT FOR SAFETY     PAST MEDICAL/PSYCHIATRIC HISTORY:   Past Medical History:   Diagnosis Date    Arthritis     Atrial fibrillation (HCC)     Back pain     CPAP (continuous positive airway pressure) dependence     setting 15    Difficulty swallowing     DJD (degenerative joint disease)     GERD (gastroesophageal reflux disease)     High cholesterol     Hx of cardiovascular stress test 04/12/2019    Lexiscan stress test    Knee pain     Morbid obesity due to excess calories ([facility]     Non-stress test nonreactive     2011    ABENA (obstructive sleep apnea) 6/25/2019    PONV (postoperative nausea and vomiting)     Problems with hearing     Sleep apnea     SOBOE (shortness of breath on exertion)     Thyroid disease        FAMILY/SOCIAL HISTORY:  Family History   Problem Relation Age of Onset    Dementia Mother     Heart Disease Father     No Known Problems Sister     No Known Problems Brother     Cancer Maternal Grandmother     Diabetes Maternal Grandfather     Heart Attack Paternal Grandmother     Heart Attack Paternal Grandfather      Social History     Socioeconomic History    Marital status: Life Partner     Spouse name: Not on file    Number of children: Not on file    Years of education: Not on file    Highest education level: Not on file   Occupational History    Not on file   Social Needs    Financial resource strain: Not on file    Food insecurity     Worry: Not on file     Inability: Not on file    Transportation needs     Medical: Not on file     Non-medical: Not on file   Tobacco Use    Smoking status: Never Smoker    Smokeless tobacco: Never Used   Substance and Sexual Activity    Alcohol use: No    Drug use: No    Sexual activity: Yes   Lifestyle    Physical activity     Days per week: Not on file     Minutes per session: Not on file    Stress: Not on file   Relationships    Social connections     Talks on phone: Not on file     Gets together: Not on file     Attends [employer] service: Not on file     Active member of club or organization: Not on file     Attends meetings of clubs or organizations: Not on file     Relationship status: Not on file    Intimate partner violence     Fear of current or ex partner: Not on file     Emotionally abused: Not on file     Physically abused: Not on file     Forced sexual activity: Not on file   Other Topics Concern    Not on file   Social History Narrative    Not on file           ROS:  [x] All negative/unchanged except if checked.  Explain positive(checked items) below:  [] Constitutional  [] Eyes  [] Ear/Nose/Mouth/Throat  [] Respiratory  [] CV  [] GI  []   [] Musculoskeletal  [] Skin/Breast  [] Neurological  [] Endocrine  [] Heme/Lymph  [] Allergic/Immunologic    Explanation:     MEDICATIONS:    Current Facility-Administered Medications:     cyproheptadine (PERIACTIN) 4 MG tablet 4 mg, 4 mg, Oral, BID, Sherell Severs, MD, 4 mg at 04/19/21 1014    thiamine mononitrate tablet 100 mg, 100 mg, Oral, Daily, Sherell Severs, MD, 100 mg at 04/19/21 1015    acetaminophen (TYLENOL) tablet 650 mg, 650 mg, Oral, Q4H PRN, Herberth Seymour MD    magnesium hydroxide (MILK OF MAGNESIA) 400 MG/5ML suspension 30 mL, 30 mL, Oral, Daily PRN, Herberth Seymour MD    aluminum & magnesium hydroxide-simethicone (MAALOX) 200-200-20 MG/5ML suspension 30 mL, 30 mL, Oral, PRN, Herberth Seymour MD    haloperidol lactate (HALDOL) injection 3 mg, 3 mg, Intramuscular, Q6H PRN **OR** haloperidol (HALDOL) tablet 3 mg, 3 mg, Oral, Q6H PRN, Herberth Seymour MD    traZODone (DESYREL) tablet 25 mg, 25 mg, Oral, Nightly PRN, Herberth Seymour MD, 25 mg at 04/18/21 2123    vitamin D (CHOLECALCIFEROL) tablet 5,000 Units, 5,000 Units, Oral, Daily, NATHALIE Chatterjee - CNP, 5,000 Units at 04/19/21 1014    levothyroxine (SYNTHROID) tablet 75 mcg, 75 mcg, Oral, QAM AC, Sandy Maharaj, APRN - CNP, 75 mcg at 04/19/21 8260    multivitamin 1 tablet, 1 tablet, Oral, Daily, Sandy Maharaj APRN - CNP, 1 tablet at 04/19/21 1014    potassium chloride (KLOR-CON M) extended release tablet 10 mEq, 10 mEq, Oral, BID, NATHALIE Chatterjee - CNP, 10 mEq at 04/19/21 1013    pantoprazole (PROTONIX) tablet 40 mg, 40 mg, Oral, QAM AC, NATHALIE Chatterjee - CNP, 40 mg at 04/19/21 0646    melatonin tablet 3 mg, 3 mg, Oral, Daily, NATHALIE Chatterjee CNP, 3 mg at 04/18/21 1722      Examination:  BP 95/67   Pulse 91   Temp 97.3 °F (36.3 °C) (Temporal)   Resp 16 Ht 6' 3\" (1.905 m)   Wt 160 lb (72.6 kg)   SpO2 97%   BMI 20.00 kg/m²   Gait - steady but psychomotor retarded, wide base, difficulty initiating activity  Medication side effects(SE): none    Mental Status Examination:    Level of consciousness:  within normal limits   Appearance:  poor grooming and good hygiene  Behavior/Motor:  psychomotor retardation  Attitude toward examiner:  guarded and withdrawn  Speech:  low productivity   Mood: depressed and anxious  Affect:  mood congruent and anxious  Thought processes:  poverty of thought   Thought content:  Obsessions/instrusive thoughts:  Regarding eating  Compulsions: not to eat  Homocidal ideation denied  Suicidal Ideation:  denies suicidal ideation  Delusions:  paranoid  Perceptual Disturbance:  auditory  Cognition:  oriented to person, place, and time   Concentration intact  Insight poor   Judgement poor     ASSESSMENT: Psychosis, catatonic, Anorexia nervosa may be caused by compulsions    Patient symptoms are:  [] Well controlled  [] Improving  [x] Worsening  [] No change      Diagnosis: Psychosis, catatonic treatment complicated by bradycardia heart rate 31, cannot use benzodiazepines    Principal Problem:    Ac[facility] psychosis ([facility]  Resolved Problems:    * No resolved hospital problems.  *      LABS:    Recent Labs     04/18/21 0818   WBC 3.1*   HGB 11.4*        Recent Labs     04/18/21  0818 04/19/21  1015    139   K 4.6 5.3*   * 104   CO2 24 22   BUN 22 25*   CREATININE 1.3* 1.8*   GLUCOSE 81 133*     Recent Labs     04/19/21  1015   BILITOT 0.3   ALKPHOS 70   AST 22   ALT 26     Lab Results   Component Value Date    LABAMPH NOT DETECTED 04/14/2021    BARBSCNU NOT DETECTED 04/14/2021    LABBENZ NOT DETECTED 04/14/2021    LABMETH NOT DETECTED 04/14/2021    OPIATESCREENURINE NOT DETECTED 04/14/2021    PHENCYCLIDINESCREENURINE NOT DETECTED 04/14/2021    ETOH <10 04/14/2021     Lab Results   Component Value Date    TSH 2.870 04/14/2021     No results found for: LITHIUM  No results found for: VALPROATE, CBMZ    RISK ASSESSMENT: Remains high risk   Treatment Plan:  Reviewed current Medications with the patient. Stop all psychotropic medications due to no change, worsening of patient with psychotropic use. Additionally due to EKG abnormality, and bradycardia  Risks, benefits, side effects, drug-to-drug interactions and alternatives to treatment were discussed. Collateral information: wife  CD evaluation  Encourage patient to attend group and other milieu activities.   Discharge planning discussed with the patient and treatment team.    PSYCHOTHERAPY/COUNSELING:  [x] Therapeutic interview  [x] Supportive  [] CBT  [] Ongoing  [] Other    [x] Patient continues to need, on a daily basis, active treatment furnished directly by or requiring the supervision of inpatient psychiatric personnel      Anticipated Length of stay  7-10 days            Electronically signed by NATHALIE Orourke CNP on 4/19/2021 at 11:32 AM
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
## 2021-04-19 NOTE — PROGRESS NOTES
Patient ripped IV tubing off of IV. IV clamped, tubing replaced. IV flushed. IV and tubing re-wrapped in rolled gauze. Elastic tubular bandage applied over rolled gauze in order to secure line. Patient reeducated on allowing infusion to complete, patient verbalized understanding.

## 2021-04-20 ENCOUNTER — HOSPITAL ENCOUNTER (INPATIENT)
Age: 66
LOS: 3 days | Discharge: HOME | DRG: 683 | End: 2021-04-23
Attending: FAMILY MEDICINE | Admitting: FAMILY MEDICINE
Payer: MEDICARE

## 2021-04-20 LAB
ANION GAP SERPL CALCULATED.3IONS-SCNC: 7 MMOL/L (ref 7–16)
BASOPHILS ABSOLUTE: 0.03 E9/L (ref 0–0.2)
BASOPHILS RELATIVE PERCENT: 0.8 % (ref 0–2)
BUN BLDV-MCNC: 27 MG/DL (ref 8–23)
CALCIUM SERPL-MCNC: 9.3 MG/DL (ref 8.6–10.2)
CHLORIDE BLD-SCNC: 108 MMOL/L (ref 98–107)
CO2: 24 MMOL/L (ref 22–29)
CREAT SERPL-MCNC: 1.6 MG/DL (ref 0.7–1.2)
EOSINOPHILS ABSOLUTE: 0.03 E9/L (ref 0.05–0.5)
EOSINOPHILS RELATIVE PERCENT: 0.8 % (ref 0–6)
GFR AFRICAN AMERICAN: 53
GFR NON-AFRICAN AMERICAN: 43 ML/MIN/1.73
GLUCOSE BLD-MCNC: 83 MG/DL (ref 74–99)
HCT VFR BLD CALC: 33.1 % (ref 37–54)
HEMOGLOBIN: 10.5 G/DL (ref 12.5–16.5)
IMMATURE GRANULOCYTES #: 0.01 E9/L
IMMATURE GRANULOCYTES %: 0.3 % (ref 0–5)
LYMPHOCYTES ABSOLUTE: 1.03 E9/L (ref 1.5–4)
LYMPHOCYTES RELATIVE PERCENT: 28.9 % (ref 20–42)
MCH RBC QN AUTO: 29.7 PG (ref 26–35)
MCHC RBC AUTO-ENTMCNC: 31.7 % (ref 32–34.5)
MCV RBC AUTO: 93.8 FL (ref 80–99.9)
MONOCYTES ABSOLUTE: 0.44 E9/L (ref 0.1–0.95)
MONOCYTES RELATIVE PERCENT: 12.3 % (ref 2–12)
NEUTROPHILS ABSOLUTE: 2.03 E9/L (ref 1.8–7.3)
NEUTROPHILS RELATIVE PERCENT: 56.9 % (ref 43–80)
PDW BLD-RTO: 14.1 FL (ref 11.5–15)
PLATELET # BLD: 260 E9/L (ref 130–450)
PMV BLD AUTO: 9.4 FL (ref 7–12)
POTASSIUM REFLEX MAGNESIUM: 4.6 MMOL/L (ref 3.5–5)
RBC # BLD: 3.53 E12/L (ref 3.8–5.8)
SODIUM BLD-SCNC: 139 MMOL/L (ref 132–146)
WBC # BLD: 3.6 E9/L (ref 4.5–11.5)

## 2021-04-20 PROCEDURE — 80048 BASIC METABOLIC PNL TOTAL CA: CPT

## 2021-04-20 PROCEDURE — 6370000000 HC RX 637 (ALT 250 FOR IP): Performed by: FAMILY MEDICINE

## 2021-04-20 PROCEDURE — 36415 COLL VENOUS BLD VENIPUNCTURE: CPT

## 2021-04-20 PROCEDURE — 2580000003 HC RX 258: Performed by: FAMILY MEDICINE

## 2021-04-20 PROCEDURE — 2140000000 HC CCU INTERMEDIATE R&B

## 2021-04-20 PROCEDURE — 85025 COMPLETE CBC W/AUTO DIFF WBC: CPT

## 2021-04-20 PROCEDURE — 99239 HOSP IP/OBS DSCHRG MGMT >30: CPT | Performed by: NURSE PRACTITIONER

## 2021-04-20 RX ORDER — ACETAMINOPHEN 650 MG/1
650 SUPPOSITORY RECTAL EVERY 6 HOURS PRN
Status: DISCONTINUED | OUTPATIENT
Start: 2021-04-20 | End: 2021-04-23 | Stop reason: HOSPADM

## 2021-04-20 RX ORDER — SODIUM CHLORIDE 0.9 % (FLUSH) 0.9 %
5-40 SYRINGE (ML) INJECTION EVERY 12 HOURS SCHEDULED
Status: DISCONTINUED | OUTPATIENT
Start: 2021-04-20 | End: 2021-04-23 | Stop reason: HOSPADM

## 2021-04-20 RX ORDER — LEVOTHYROXINE SODIUM 0.07 MG/1
75 TABLET ORAL DAILY
Status: DISCONTINUED | OUTPATIENT
Start: 2021-04-20 | End: 2021-04-23 | Stop reason: HOSPADM

## 2021-04-20 RX ORDER — SODIUM CHLORIDE 9 MG/ML
25 INJECTION, SOLUTION INTRAVENOUS PRN
Status: DISCONTINUED | OUTPATIENT
Start: 2021-04-20 | End: 2021-04-23 | Stop reason: HOSPADM

## 2021-04-20 RX ORDER — QUETIAPINE FUMARATE 25 MG/1
25 TABLET, FILM COATED ORAL 2 TIMES DAILY
Status: DISCONTINUED | OUTPATIENT
Start: 2021-04-20 | End: 2021-04-23 | Stop reason: HOSPADM

## 2021-04-20 RX ORDER — PROMETHAZINE HYDROCHLORIDE 25 MG/1
12.5 TABLET ORAL EVERY 6 HOURS PRN
Status: DISCONTINUED | OUTPATIENT
Start: 2021-04-20 | End: 2021-04-23 | Stop reason: HOSPADM

## 2021-04-20 RX ORDER — ONDANSETRON 2 MG/ML
4 INJECTION INTRAMUSCULAR; INTRAVENOUS EVERY 6 HOURS PRN
Status: DISCONTINUED | OUTPATIENT
Start: 2021-04-20 | End: 2021-04-23 | Stop reason: HOSPADM

## 2021-04-20 RX ORDER — SODIUM CHLORIDE 0.9 % (FLUSH) 0.9 %
5-40 SYRINGE (ML) INJECTION PRN
Status: DISCONTINUED | OUTPATIENT
Start: 2021-04-20 | End: 2021-04-23 | Stop reason: HOSPADM

## 2021-04-20 RX ORDER — ACETAMINOPHEN 325 MG/1
650 TABLET ORAL EVERY 6 HOURS PRN
Status: DISCONTINUED | OUTPATIENT
Start: 2021-04-20 | End: 2021-04-23 | Stop reason: HOSPADM

## 2021-04-20 RX ORDER — POLYETHYLENE GLYCOL 3350 17 G/17G
17 POWDER, FOR SOLUTION ORAL DAILY PRN
Status: DISCONTINUED | OUTPATIENT
Start: 2021-04-20 | End: 2021-04-23 | Stop reason: HOSPADM

## 2021-04-20 RX ORDER — SODIUM CHLORIDE 9 MG/ML
INJECTION, SOLUTION INTRAVENOUS CONTINUOUS
Status: DISCONTINUED | OUTPATIENT
Start: 2021-04-20 | End: 2021-04-22

## 2021-04-20 RX ADMIN — SODIUM CHLORIDE: 9 INJECTION, SOLUTION INTRAVENOUS at 11:41

## 2021-04-20 RX ADMIN — QUETIAPINE FUMARATE 25 MG: 25 TABLET ORAL at 10:41

## 2021-04-20 RX ADMIN — SODIUM CHLORIDE: 9 INJECTION, SOLUTION INTRAVENOUS at 06:17

## 2021-04-20 RX ADMIN — RIVAROXABAN 15 MG: 15 TABLET, FILM COATED ORAL at 10:41

## 2021-04-20 RX ADMIN — MAGNESIUM GLUCONATE 500 MG ORAL TABLET 400 MG: 500 TABLET ORAL at 10:40

## 2021-04-20 RX ADMIN — LEVOTHYROXINE SODIUM 75 MCG: 0.07 TABLET ORAL at 06:42

## 2021-04-20 ASSESSMENT — PAIN SCALES - GENERAL
PAINLEVEL_OUTOF10: 0

## 2021-04-20 NOTE — PROGRESS NOTES
Spoke with Dr. Cassius Jacobo with sound. Patient to be transferred to medical unit. Spoke with Dr. Martinez Millan regarding transfer to medical unit and Dr. Martinez Millan approved the transfer.

## 2021-04-20 NOTE — PLAN OF CARE
Problem: Mental Status - Impaired:  Goal: Mental status will improve  Description: Mental status will improve  Outcome: Met This Shift

## 2021-04-20 NOTE — PLAN OF CARE
Problem: Altered Mood, Manic Behavior:  Goal: Ability to interact with others will improve  Description: Ability to interact with others will improve  4/19/2021 2140 by Daly Monroe RN  Outcome: Not Met This Shift     Problem: Discharge Planning:  Goal: Ability to perform activities of daily living will improve  Description: Ability to perform activities of daily living will improve  Outcome: Not Met This Shift

## 2021-04-20 NOTE — PROGRESS NOTES
Patient pulled out IV. When asked why he did it he replied, \"sometimes these things happen. \" Re-educated patient on importance of having an IV in place for fluids and medication. Tele-sitter initiated.

## 2021-04-20 NOTE — PLAN OF CARE
Problem: Mental Status - Impaired:  Goal: Mental status will improve  Description: Mental status will improve  4/20/2021 6525 by Sara Cotter RN  Outcome: Met This Shift     Problem: Anxiety:  Goal: Level of anxiety will decrease  Description: Level of anxiety will decrease  Outcome: Met This Shift

## 2021-04-20 NOTE — PLAN OF CARE
Problem: Mental Status - Impaired:  Goal: Mental status will improve  Description: Mental status will improve  4/20/2021 1115 by Anai Tinoco RN  Outcome: Met This Shift     Problem: Cardiac:  Goal: Ability to maintain an adequate cardiac output will improve  Description: Ability to maintain an adequate cardiac output will improve  Outcome: Met This Shift

## 2021-04-20 NOTE — PROGRESS NOTES
[facility]  Discharge Note    Pt discharged with followings belongings:   Dentures: None  Vision - Corrective Lenses: None  Hearing Aid: None  Jewelry: None  Body Piercings Removed: No  Clothing: Jacket / coat, Footwear, Pants, Shirt, Socks  Were All Patient Medications Collected?: No  Other Valuables: None   Valuables sent home with patient to [facility]. Valuables retrieved from safe, Security envelope number:  N/A and returned to patient. Patient education on aftercare instructions: yes  Information faxed to N/A by N/A Patient verbalize understanding of AVS:  N/A sent to medical unit.     Status EXAM upon discharge:  Status and Exam  Normal: No  Facial Expression: Avoids Gaze, Flat  Affect: Blunt  Level of Consciousness: Alert  Mood:Normal: No  Mood: Sad  Motor Activity:Normal: No  Motor Activity: Decreased  Interview Behavior: Cooperative, Evasive  Preception: [place] to Person, [place] to Time, [place] to Place  Attention:Normal: No  Attention: Distractible  Thought Processes: Blocking  Thought Content:Normal: No  Thought Content: Poverty of Content  Hallucinations: None  Delusions: No  Memory:Normal: No  Memory: Poor Recent  Insight and Judgment: No  Insight and Judgment: Poor Judgment, Poor Insight  Present Suicidal Ideation: No  Present Homicidal Ideation: No      Metabolic Screening:    Lab Results   Component Value Date    LABA1C 4.7 04/16/2021       Lab Results   Component Value Date    CHOL 141 04/16/2021    CHOL 161 06/09/2020    CHOL 136 01/20/2020    CHOL 178 10/07/2019    CHOL 196 07/30/2019    CHOL 190 07/01/2019    CHOL 212 (H) 04/12/2019    CHOL 199 03/08/2019    CHOL 232 (H) 10/24/2013    CHOL 249 (H) 02/04/2011     Lab Results   Component Value Date    TRIG 67 04/16/2021    TRIG 79 06/09/2020    TRIG 97 01/20/2020    TRIG 124 10/07/2019    TRIG 157 (H) 07/30/2019    TRIG 168 (H) 07/01/2019    TRIG 221 (H) 04/12/2019    TRIG 134 03/08/2019    TRIG 117 10/24/2013    TRIG 150 (H) 02/04/2011 Lab Results   Component Value Date    HDL 35 04/16/2021    HDL 33 04/12/2019    HDL 39.0 (A) 10/24/2013    HDL 40.0 (A) 02/04/2011     No components found for: [facility]  Lab Results   Component Value Date    LABVLDL 13 04/16/2021    LABVLDL 44 04/12/2019       Mariah Clark RN

## 2021-04-20 NOTE — PROGRESS NOTES
Message sent to Dr. Magali Melvin via Perfect serve:  \"Patient just admitted from Psych for ANJANA. Patient presented to the floor about 15 minutes ago. His HEART RHYTHM IS SINUS BRADYCARDIA WITH A 1ST degree AV block and his rate has been 30-35. Patient appears asymptomatic with no complaints at this time. His blood pressure is 146/72 at this time. We need admission orders. His medication reconciliation is complete. Thank you. \"
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
## 2021-04-20 NOTE — H&P
Hospital Medicine History & Physical      PCP: Leilani Vick    Date of Admission: 4/20/2021    Date of Service: Pt seen/examined on 4/20/2021  and Admitted to Inpatient with expected LOS greater than two midnights due to medical therapy. Chief Complaint:  Acute kidney injury       History Of Present Illness:   76 y.o. male with a past medical history of  Atrial fibrillation . Patient was admitted to behavioral health  unit on 4/15 due to acute psychosis after receiving  covid vaccination . He was placed on Zyprexa and Depakote . Patient required medical admission due to acute kidney injury with creatinine of 1.9 on admission  . Patient has refused to eat or drink . Patient also has sinus bradycardia with HR  in the 30-40s. He is asymptomatic  . Patient will need intravenous hydration . He reports no chest pain dizziness shortness of breath fever or chills .  EKG revealed 1st degree AV block and normal sinus rhythm     Past Medical History:          Diagnosis Date    Arthritis     Atrial fibrillation (HCC)     Back pain     CPAP (continuous positive airway pressure) dependence     setting 15    Difficulty swallowing     DJD (degenerative joint disease)     GERD (gastroesophageal reflux disease)     High cholesterol     Hx of cardiovascular stress test 04/12/2019    Lexiscan stress test    Knee pain     Morbid obesity due to excess calories ([address]     Non-stress test nonreactive     2011    ABENA (obstructive sleep apnea) 6/25/2019    PONV (postoperative nausea and vomiting)     Problems with hearing     Sleep apnea     SOBOE (shortness of breath on exertion)     Thyroid disease        Past Surgical History:          Procedure Laterality Date    APPENDECTOMY      BACK SURGERY      laminectomy     CARDIOVERSION      2 years ago dr [facility]    CHOLECYSTECTOMY      COLONOSCOPY      COLONOSCOPY  05/31/2019    COLONOSCOPY N/A 5/31/2019    COLONOSCOPY performed by Floyd Hdz MD Ht 6' 3\" (1.905 m)   Wt 154 lb 9.6 oz (70.1 kg)   BMI 19.32 kg/m²     General appearance:  No apparent distress, appears stated age and cooperative. HEENT:  Normal cephalic, atraumatic without obvious deformity. Pupils equal, round, and reactive to light. Extra ocular muscles intact. Conjunctivae/corneas clear. Neck: Supple, with full range of motion. No jugular venous distention. Trachea midline. Respiratory:  Normal respiratory effort. Clear to auscultation, bilaterally without Rales/Wheezes/Rhonchi. Cardiovascular:  Regular rate and bradycardia  with normal S1/S2 without murmurs, rubs or gallops. Abdomen: Soft, non-tender, non-distended with normal bowel sounds. Musculoskeletal:  No clubbing, cyanosis or edema bilaterally. Full range of motion without deformity. Skin: Skin color, texture, turgor normal.  No rashes or lesions. Neurologic:  Neurovascularly intact without any focal sensory/motor deficits. Cranial nerves: II-XII intact, grossly non-focal.  Psychiatric:   He denies SI or HI Alert and oriented, thought content appropriate, normal insight      EKG:  I have reviewed the EKG     Labs:     Recent Labs     04/18/21  0818   WBC 3.1*   HGB 11.4*   HCT 36.3*        Recent Labs     04/18/21  0818 04/19/21  1015 04/19/21  1545    139 141   K 4.6 5.3* 5.2*   * 104 106   CO2 24 22 22   BUN 22 25* 28*   CREATININE 1.3* 1.8* 1.9*   CALCIUM 9.3 9.6 9.5     Recent Labs     04/19/21  1015   AST 22   ALT 26   BILITOT 0.3   ALKPHOS 70     No results for input(s): INR in the last 72 hours.   Recent Labs     04/19/21  1015   CKTOTAL 124       Urinalysis:      Lab Results   Component Value Date    NITRU Negative 04/14/2021    WBCUA 2-5 04/14/2021    BACTERIA RARE 04/14/2021    RBCUA 1-3 04/14/2021    RBCUA NONE 10/24/2013    BLOODU Negative 04/14/2021    SPECGRAV 1.020 04/14/2021    GLUCOSEU Negative 04/14/2021         ASSESSMENT:    Active Hospital Problems    Diagnosis Date Noted    ANJANA (acute kidney injury) ([address] [N17.9] 04/19/2021       PLAN:    Acute Kidney injury : patient is an admit from [facility] . Serum creatinine has been steadily increasing . Patient has refused to eat  Or drink and will need intravenous hydration . Admit inpatient vitals telemetry IVF NS monitor kidney  function and electrolytes     Hyperkalemia : due to ANJANA will repeat in am potassium is 5.2     Sinus bradycardia : HR is in the 30-40s patiens is asymptomatic. Psychotropic medications have been discontinued  .  Cardiology consult     Atrial Fibrillation : C/w Xarelto     Acute Psychosis : C/w Seroquel             DVT Prophylaxis: Xarelto   Diet: DIET GENERAL;  Code Status: Full Code         Kaela Mills MD

## 2021-04-20 NOTE — PROGRESS NOTES
Patient has been in his room sleeping for most of this shift. Patient refuses to eat or drink for staff. Patient ripped out his IV again. Patient oriented to person, place, and time, disoriented to situation. Patient responds to his name but just paces up and down the hallway. Patient denies all and just says \"no\" to every question asked. Will notify sound doctor regarding removal of IV. Patient is encouraged to continue to work towards discharge goal by complying with medications, attending groups and to seek staff if feelings are overwhelming. Environmental rounds completed per unit policy to maintain safety of everyone on the unit. Staff will offer support and interventions as requested or required.

## 2021-04-21 PROBLEM — R41.0 TRANSIENT CONFUSION: Status: ACTIVE | Noted: 2021-04-21

## 2021-04-21 LAB
INR BLD: 1.7
PROTHROMBIN TIME: 18.3 SEC (ref 9.3–12.4)

## 2021-04-21 PROCEDURE — 86592 SYPHILIS TEST NON-TREP QUAL: CPT

## 2021-04-21 PROCEDURE — 6370000000 HC RX 637 (ALT 250 FOR IP): Performed by: FAMILY MEDICINE

## 2021-04-21 PROCEDURE — 99233 SBSQ HOSP IP/OBS HIGH 50: CPT | Performed by: NURSE PRACTITIONER

## 2021-04-21 PROCEDURE — 99223 1ST HOSP IP/OBS HIGH 75: CPT | Performed by: SPECIALIST

## 2021-04-21 PROCEDURE — 36415 COLL VENOUS BLD VENIPUNCTURE: CPT

## 2021-04-21 PROCEDURE — 2580000003 HC RX 258: Performed by: PSYCHIATRY & NEUROLOGY

## 2021-04-21 PROCEDURE — 2140000000 HC CCU INTERMEDIATE R&B

## 2021-04-21 PROCEDURE — 85610 PROTHROMBIN TIME: CPT

## 2021-04-21 PROCEDURE — 99231 SBSQ HOSP IP/OBS SF/LOW 25: CPT | Performed by: NURSE PRACTITIONER

## 2021-04-21 PROCEDURE — 2580000003 HC RX 258: Performed by: FAMILY MEDICINE

## 2021-04-21 RX ORDER — SODIUM CHLORIDE 9 MG/ML
25 INJECTION, SOLUTION INTRAVENOUS PRN
Status: DISCONTINUED | OUTPATIENT
Start: 2021-04-21 | End: 2021-04-23 | Stop reason: HOSPADM

## 2021-04-21 RX ORDER — SODIUM CHLORIDE 0.9 % (FLUSH) 0.9 %
5-40 SYRINGE (ML) INJECTION EVERY 12 HOURS SCHEDULED
Status: DISCONTINUED | OUTPATIENT
Start: 2021-04-21 | End: 2021-04-23 | Stop reason: HOSPADM

## 2021-04-21 RX ORDER — SODIUM CHLORIDE 0.9 % (FLUSH) 0.9 %
5-40 SYRINGE (ML) INJECTION PRN
Status: DISCONTINUED | OUTPATIENT
Start: 2021-04-21 | End: 2021-04-23 | Stop reason: HOSPADM

## 2021-04-21 RX ADMIN — MAGNESIUM GLUCONATE 500 MG ORAL TABLET 400 MG: 500 TABLET ORAL at 08:28

## 2021-04-21 RX ADMIN — QUETIAPINE FUMARATE 25 MG: 25 TABLET ORAL at 21:29

## 2021-04-21 RX ADMIN — Medication 10 ML: at 08:29

## 2021-04-21 RX ADMIN — LEVOTHYROXINE SODIUM 75 MCG: 0.07 TABLET ORAL at 06:16

## 2021-04-21 RX ADMIN — SODIUM CHLORIDE: 9 INJECTION, SOLUTION INTRAVENOUS at 06:16

## 2021-04-21 RX ADMIN — ACETAMINOPHEN 650 MG: 325 TABLET ORAL at 21:30

## 2021-04-21 RX ADMIN — QUETIAPINE FUMARATE 25 MG: 25 TABLET ORAL at 08:29

## 2021-04-21 RX ADMIN — RIVAROXABAN 15 MG: 15 TABLET, FILM COATED ORAL at 08:28

## 2021-04-21 ASSESSMENT — PAIN SCALES - GENERAL
PAINLEVEL_OUTOF10: 0
PAINLEVEL_OUTOF10: 0

## 2021-04-21 NOTE — PLAN OF CARE
Problem: Mental Status - Impaired:  Goal: Mental status will improve  Description: Mental status will improve  Outcome: Ongoing     Problem: Anxiety:  Goal: Level of anxiety will decrease  Description: Level of anxiety will decrease  Outcome: Ongoing     Problem: Cardiac:  Goal: Ability to maintain an adequate cardiac output will improve  Description: Ability to maintain an adequate cardiac output will improve  Outcome: Ongoing  Goal: Hemodynamic stability will improve  Description: Hemodynamic stability will improve  Outcome: Ongoing     Problem: Fluid Volume:  Goal: Ability to achieve and maintain adequate urine output will improve  Description: Ability to achieve and maintain adequate urine output will improve  Outcome: Ongoing     Problem: Respiratory:  Goal: Respiratory status will improve  Description: Respiratory status will improve  Outcome: Ongoing

## 2021-04-21 NOTE — DISCHARGE SUMMARY
DISCHARGE SUMMARY      Patient ID:  Valentin Casiano  [id number]  76 y.o.  1955    Admit date: 4/14/2021    Discharge date and time: 4/21/2021    Admitting Physician: Dacia Villafana MD     Discharge Physician: Dr Ginny Campo MD    Discharge Diagnoses:   Patient Active Problem List   Diagnosis    Gastroesophageal reflux disease without esophagitis    Chest pain    S/P gastric bypass    ABENA (obstructive sleep apnea)    High cholesterol    Atrial fibrillation (HCC)    Dehydration    Non-intractable cyclical vomiting with nausea    Intractable cyclical vomiting with nausea    Vitamin B12 deficiency    Vitamin B12 deficiency (non anemic)    [facility] psychosis [facility]    ANJANA (acute kidney injury) ([facility]    Altered mental status       Admission Condition: poor    Discharged Condition: stable    Admission Circumstance:   Acute psychosis, personality and behavioral changes after receiving second Covid shot.       PAST MEDICAL/PSYCHIATRIC HISTORY:   Past Medical History:   Diagnosis Date    Arthritis     Atrial fibrillation (HCC)     Back pain     CPAP (continuous positive airway pressure) dependence     setting 15    Difficulty swallowing     DJD (degenerative joint disease)     GERD (gastroesophageal reflux disease)     High cholesterol     Hx of cardiovascular stress test 04/12/2019    Lexiscan stress test    Knee pain     Morbid obesity due to excess calories (HCC)     Non-stress test nonreactive     2011    ABENA (obstructive sleep apnea) 6/25/2019    PONV (postoperative nausea and vomiting)     Problems with hearing     Sleep apnea     SOBOE (shortness of breath on exertion)     Thyroid disease        FAMILY/SOCIAL HISTORY:  Family History   Problem Relation Age of Onset    Dementia Mother     Heart Disease Father     No Known Problems Sister     No Known Problems Brother     Cancer Maternal Grandmother     Diabetes Maternal Grandfather     Heart Attack Paternal Grandmother     Heart Attack Paternal Grandfather      Social History     Socioeconomic History    Marital status: Life Partner     Spouse name: Not on file    Number of children: Not on file    Years of education: Not on file    Highest education level: Not on file   Occupational History    Not on file   Social Needs    Financial resource strain: Not on file    Food insecurity     Worry: Not on file     Inability: Not on file   [employer] needs     Medical: Not on file     Non-medical: Not on file   Tobacco Use    Smoking status: Never Smoker    Smokeless tobacco: Never Used   Substance and Sexual Activity    Alcohol use: No    Drug use: No    Sexual activity: Yes   Lifestyle    Physical activity     Days per week: Not on file     Minutes per session: Not on file    Stress: Not on file   Relationships    Social connections     Talks on phone: Not on file     Gets together: Not on file     Attends Confucianist service: Not on file     Active member of club or organization: Not on file     Attends meetings of clubs or organizations: Not on file     Relationship status: Not on file    Intimate partner violence     Fear of current or ex partner: Not on file     Emotionally abused: Not on file     Physically abused: Not on file     Forced sexual activity: Not on file   Other Topics Concern    Not on file   Social History Narrative    Not on file       MEDICATIONS:  No current facility-administered medications for this encounter. No current outpatient medications on file.     Facility-Administered Medications Ordered in Other Encounters:     levothyroxine (SYNTHROID) tablet 75 mcg, 75 mcg, Oral, Daily, Scottie Martínez MD, 75 mcg at 04/21/21 0616    magnesium oxide (MAG-OX) tablet 400 mg, 400 mg, Oral, Daily, Scottie Martínez MD, 400 mg at 04/21/21 8162    QUEtiapine (SEROQUEL) tablet 25 mg, 25 mg, Oral, BID, Scottie Martínez MD, 25 mg at 04/21/21 0829    0.9 % sodium chloride infusion, , Intravenous, Continuous, Ramsey Monreal MD, Last Rate: 125 mL/hr at 04/21/21 0616, New Bag at 04/21/21 0616    sodium chloride flush 0.9 % injection 5-40 mL, 5-40 mL, Intravenous, 2 times per day, Ramsey Monreal MD, 10 mL at 04/21/21 0829    sodium chloride flush 0.9 % injection 5-40 mL, 5-40 mL, Intravenous, PRN, Ramsey Monreal MD    0.9 % sodium chloride infusion, 25 mL, Intravenous, PRN, Ramsey Monreal MD    promethazine (PHENERGAN) tablet 12.5 mg, 12.5 mg, Oral, Q6H PRN **OR** ondansetron (ZOFRAN) injection 4 mg, 4 mg, Intravenous, Q6H PRN, Ramsey Monreal MD    polyethylene glycol (GLYCOLAX) packet 17 g, 17 g, Oral, Daily PRN, Ramsey Monreal MD    acetaminophen (TYLENOL) tablet 650 mg, 650 mg, Oral, Q6H PRN **OR** acetaminophen (TYLENOL) suppository 650 mg, 650 mg, Rectal, Q6H PRN, Ramsey Monreal MD    rivaroxaban (XARELTO) tablet 15 mg, 15 mg, Oral, Daily, Ramsey Monreal MD, 15 mg at 04/21/21 5151    perflutren lipid microspheres (DEFINITY) injection 1.65 mg, 1.5 mL, Intravenous, ONCE PRN, Ramsey Monreal MD    Examination:  /72   Pulse (!) 42   Temp 98 °F (36.7 °C) (Temporal)   Resp 16   Ht 6' 3\" (1.905 m)   Wt 152 lb 6.4 oz (69.1 kg)   SpO2 99%   BMI 19.05 kg/m²   Gait - steady    HOSPITAL COURSE[de-identified]  Following admission to the hospital, patient had a complete physical exam and blood work up, which he was medically cleared and admitted to [facility] for psychiatric evaluation and stabilization. The patient was monitored closely with suicide and appropriate precautions. He was started on Luvox 50 mg twice daily, Zyprexa 2-1/2 mg twice daily for acute psychosis, and possible post Covid psychosis. While on the psychiatric unit, the patient showed no response to medications. His condition did not change. And in fact worsened, he began refusing food, fluids. He remained confused and alert and oriented mostly to self. Medical was consulted due to many factors including bradycardia and lack of nourishment.   The patient was transferred to the medical unit with ANJANA for medical stabilization. The patient was discharged from [facility] to the medical unit. Appetite:  [x] Normal  [] Increased  [] Decreased    Sleep:       [x] Normal  [] Fair       [] Poor            Energy:    [x] Normal  [] Increased  [] Decreased     SI [] Present  [x] Absent  HI  []Present  [x] Absent   Aggression:  [] yes  [] no  Patient is [x] able  [] unable to CONTRACT FOR SAFETY   Medication side effects(SE):  [x] None(Psych. Meds.) [] Other      Mental Status Examination on discharge:    Level of consciousness:  within normal limits   Appearance: Overall emaciated and ill-appearing  Behavior/Motor: Slow movements, pacing  Attitude toward examiner: Cooperative  Speech: Slow, delayed and long latency of response  Mood: Pleasantly confused  Affect: Flat  Thought processes: Pleasantly confused  Thought content: Pleasantly confused  Cognition:  oriented to person, place, and time   Concentration Limited/pleasantly confused  memory Limited  Insight marginal  Judgement marginal   fund of Knowledge marginal      ASSESSMENT:  Patient symptoms are:  [] Well controlled  [] Improving  [] Worsening  [x] No change    Reason for more than one antipsychotic:  [x] N/A  [] 3 Failed Monotherapy attempts (Drugs tried:)  [] Crossover to a new antipsychotic  [] Taper to Monotherapy from Polypharmacy  [] Augmentation of clozapine therapy due to treatment resistance to single therapy    Diagnosis:  Principal Problem:    [facility]  Active Problems:    ANJANA (acute kidney injury) ([facility]    Altered mental status  Resolved Problems:    * No resolved hospital problems.  *      LABS:    Recent Labs     04/20/21  0535   WBC 3.6*   HGB 10.5*        Recent Labs     04/19/21  1015 04/19/21  1545 04/20/21  0535    141 139   K 5.3* 5.2* 4.6    106 108*   CO2 22 22 24   BUN 25* 28* 27*   CREATININE 1.8* 1.9* 1.6*   GLUCOSE 133* 105* 83     Recent Labs 04/19/21  1015   BILITOT 0.3   ALKPHOS 70   AST 22   ALT 26     Lab Results   Component Value Date    LABAMPH NOT DETECTED 04/14/2021    BARBSCNU NOT DETECTED 04/14/2021    LABBENZ NOT DETECTED 04/14/2021    LABMETH NOT DETECTED 04/14/2021    OPIATESCREENURINE NOT DETECTED 04/14/2021    PHENCYCLIDINESCREENURINE NOT DETECTED 04/14/2021    ETOH <10 04/14/2021     Lab Results   Component Value Date    TSH 2.870 04/14/2021     No results found for: LITHIUM  No results found for: VALPROATE, CBMZ    RISK ASSESSMENT AT DISCHARGE: Low risk for suicide and homicide. Treatment Plan:  The patient's diagnosis, treatment plan, medication management were formulated after patient was seen directly by the attending physician and myself and all relevant documentation was reviewed. Reviewed current Medications with the patient. Education provided on the complaince with treatment. Risk, benefit, side effects, possible outcomes of the medication and alternatives discussed with the patient and the patient demonstrated understanding. The patient was also educated that the outcome of treatment will depend on the medication compliance as directed by the prescribers along with regular follow-up, compliance with the labs and other work-up, as clinically indicated. Stop all psychotropic medications due to no change, worsening of patient with psychotropic use. Additionally due to EKG abnormality, and bradycardia  Risks, benefits, side effects, drug-to-drug interactions and alternatives to treatment were considered. Encourage patient to attend outpatient follow up appointment and therapy. Patient was advised to call the outpatient provider, visit the nearest ED or call 911 if symptoms are not manageable. Patient's family member was contacted prior to the discharge, patient was transferred to a medical unit due to ANJANA.          Medication List      STOP taking these medications    aspirin 81 MG tablet     carvedilol 6.25 MG tablet  Commonly known as: COREG     dofetilide 500 MCG capsule  Commonly known as: TIKOSYN     raNITIdine 150 Max Strength 150 MG tablet  Generic drug: raNITIdine        ASK your doctor about these medications    levothyroxine 75 MCG tablet  Commonly known as: SYNTHROID     magnesium oxide 400 MG tablet  Commonly known as: MAG-OX     QUEtiapine 25 MG tablet  Commonly known as: SEROQUEL     rivaroxaban 20 MG Tabs tablet  Commonly known as: [place] SPEND - 35 MINUTES TO COMPLETE THE EVALUATION, DISCHARGE SUMMARY, MEDICATION RECONCILIATION AND FOLLOW UP CARE     Signed:  Mera Zepeda  4/21/2021  11:19 AM

## 2021-04-21 NOTE — PROGRESS NOTES
Hospitalist Progress Note      PCP: Mima Ramon    Date of Admission: 4/20/2021  Days in the hospital: 1    Hospital Course:   Patient is a 77-year-old with history of atrial fibrillation who was initially admitted to behavioral unit with complaints of psychosis. Patient was started on Zyprexa and Depakote. Patient later on developed acute kidney injury and so he was transferred to medical floor for IV fluid administration. He also was noted to have bradycardia and so consultation was placed to EP and the recommended continue to monitor for now and no intervention is planned. Subjective  Patient seen and examined at bedside. Creatinine is 1.6 today. Awaiting psychiatry evaluation. Seen by neurology and recommending LP and EEG. Exam:    /64   Pulse 56   Temp 98 °F (36.7 °C) (Temporal)   Resp 16   Ht 6' 3\" (1.905 m)   Wt 154 lb 9.6 oz (70.1 kg)   SpO2 98%   BMI 19.32 kg/m²     HEENT: No pallor, no icterus. Respiratory:  CTA, good air entry. Cardiovascular: RRR, no murmur. Abdomen: Soft, non-tender, BS noted. Musculoskeletal: No joint pains or joint swelling noted.    Neurologic: awake, alert and following commands       Assessment/Plan:  Active Hospital Problems    Diagnosis Date Noted    [facility]acute kidney [facility]) ([facility] [N17.9] 04/19/2021     · Bradycardia, asymptomatic  · Psychosis  · Hyperkalemia  · Atrial fibrillation  · Hypothyroidism    Plan:  · Creatinine improving, continue to monitor  · Continue to monitor heart rate, hemodynamically stable, no intervention planned, echocardiogram pending  · Follow-up with neurology, LP and EEG ordered, will follow  · Potassium improved  · Heart rate controlled, continue anticoagulation  · Continue Synthroid      Labs:   Recent Labs     04/20/21  0535   WBC 3.6*   HGB 10.5*   HCT 33.1*        Recent Labs     04/19/21  1015 04/19/21  1545 04/20/21  0535    141 139   K 5.3* 5.2* 4.6    106 108*   CO2 22 22 24   BUN 25* 28* 27*   CREATININE 1.8* 1.9* 1.6*   CALCIUM 9.6 9.5 9.3     Recent Labs     04/19/21  1015   AST 22   ALT 26   BILITOT 0.3   ALKPHOS 70     No results for input(s): INR in the last 72 hours. Recent Labs     04/19/21  1015   CKTOTAL 124       Medications:  Reviewed    Infusion Medications    sodium chloride 125 mL/hr at 04/21/21 0616    sodium chloride       Scheduled Medications    levothyroxine  75 mcg Oral Daily    magnesium oxide  400 mg Oral Daily    QUEtiapine  25 mg Oral BID    sodium chloride flush  5-40 mL Intravenous 2 times per day    rivaroxaban  15 mg Oral Daily     PRN Meds: sodium chloride flush, sodium chloride, promethazine **OR** ondansetron, polyethylene glycol, acetaminophen **OR** acetaminophen, perflutren lipid microspheres      Intake/Output Summary (Last 24 hours) at 4/21/2021 0941  Last data filed at 4/21/2021 0917  Gross per 24 hour   Intake 60 ml   Output 700 ml   Net -640 ml     · Body mass index is 19.32 kg/m². · Diet  · DIET GENERAL;    · Code Status  · Full Code       Electronically signed by Robin Kelsey MD on 4/21/2021 at 9:41 AM  [employer]   Please contact me through perfect [employer]    NOTE: This report was transcribed using voice recognition software. Every effort was made to ensure accuracy; however, inadvertent computerized transcription errors may be present.

## 2021-04-21 NOTE — CONSULTS
CARDIAC ELECTROPHYSIOLOGY CONSULTATION    PATIENT: Elgin RECORD NUMBER: [id number]  DATE OF SERVICE:  4/21/2021  CONSULTING ELECTROPHYSIOLOGIST: Kiya Taylor  PHYSICIAN REQUESTING CONSULTATION: Danny Bingham  REASON FOR CONSULTATION: Bradycardia    PRESENTATION  Admitted to [address] [place] [address] 4/15 with acute psychosis (per report). Subsequently transferred to medical bed due to evidence of ANJANA. SYNOPSIS OF RELEVANT MEDICAL ISSUES  1. Poor metabolic health incurring multiple co-morbid conditions. 2. Prior morbid obesity: [place] en Y 2019, with marked subsequent weight loss. Details uncertain. 3. Psychopathy: details uncertain. 4. Leukopenia: etiology uncertain. 5. Hypothyroidism, said to be downstream from [address] [place] [address]. On supplementation. Laboratory euthyroid. 6. Diminished GFR with hyperkalemia: resolving. 7. Sinus node somnolence: likely long-standing. No discernable attributable symptoms at this point. No predicate suspicious episodes of compromised consciousness. 8. First degree AV block. 9. Intraventricular conduction delay (QRSd 102). 10. Atrial fibrillation: lineage uncertain. He was treated with dofetilide prior to bariatric surgery; it was discontinued as of 2/21 given clinical quiescence in the setting of marked weight loss. No prior ablation. He remains clinically quiescent. 11. Ventricular premature beats: low burden. Asymptomatic. 12. Normal cardiac mechanical function (likely). 13. CHADS-VASC > 1 (pre-bariatric surgery). Compliant with and tolerant of rivaroxaban. RECOMMENDATIONS  1. Nothing to do with the bradycardia at this point, apart from avoiding drugs with the propensity for further sinus node suppression. 2. It is reasonable to continue DOAC open-ended. 3. He does not need outpatient FU with EP at this point. 4. Please call me if echocardiogram demonstrates any significant abnormality.     I will sign off. Luisa Stauffer MD, [facility]  Cardiac Electrophysiology  [phone]    8:51 AM  4/21/2021    I spent 90 minutes providing care for this patient, >50% of which was dedicated to counseling or care coordination.

## 2021-04-21 NOTE — PLAN OF CARE
For the LP radiology needs PT/INR prior to procedure and im assuming given the diagnosis consent will be by phone, please call radiology when PT and INR are done

## 2021-04-21 NOTE — CONSULTS
state. Patient was able to explain the circumstances leading to his hospitalization as well as his current treatment. At this time patient is not suicidal homicidal psychotic or manic. Patient has shown significant improvement while off psychotropic medications      Mental status examination:    Psychomotor evaluation revealed no agitation retardation any abnormal movements. His eye contact is fair his speech is normal rate rhythm and tone. His mood is \"I feel a lot better. \"  Affect is mood congruent bright pleasant and appropriate. His thought process is linear without flight of ideas loose associations. Thought contents devoid of any auditory visual hallucinations delusions or any perceptual abnormalities. He denies suicidal homicidal ideations intent or plan impulse control is adequate cognitive function peers to be his baseline his insight judgment is fair he is alert oriented time place and person    Clinical impression:  Transient confusion resolving    Patient most likely suffered. Transient confusion related to receiving the vaccine this confusion is now resolving without any psychotropic medications. Plans and recommendations:  Discussed with Dr. Mary Rock. Patient is not suicidal homicidal psychotic or manic does not meet criteria for inpatient level psychiatric treatment. At this time patient is bright pleasant appropriate there are no overt overt signs psychosis he is able to talk with relevance as well as give the circumstances hospitalization. This patient to follow-up on an outpatient basis. Psychiatry will sign off please call with any further concerns.

## 2021-04-21 NOTE — PROGRESS NOTES
issue.    There is no family at bedside currently    Vital signs at present time are stable    At present time patient is medically stable with no other complaints above mentioned.     Objective:     /64   Pulse 56   Temp 98 °F (36.7 °C) (Temporal)   Resp 16   Ht 6' 3\" (1.905 m)   Wt 154 lb 9.6 oz (70.1 kg)   SpO2 98%   BMI 19.32 kg/m²     General appearance: alert, appears stated age, cooperative and no distress  Head: normocephalic, without obvious abnormality, atraumatic  Eyes: conjunctivae/corneas clear. .  Neck:  supple, symmetrical, trachea midline   Lungs: clear to auscultation bilaterally  Heart: regular rate and rhythm  Extremities: normal, atraumatic, no cyanosis or edema  Pulses: 2+ and symmetric  Skin: color, texture, turgor normal---no rashes or lesions      Mental Status: Awake, alert and oriented x4, slow to process but appropriate, suspicious of questions    Decreased attention/concentration  Intact fundus of knowledge  ? Intact memories    Speech: no dysarthria but slow speech  Language: no clear-cut aphasias    Cranial Nerves:  I: smell NA   II: visual acuity  NA   II: visual fields Full to confrontation   II: pupils WALLACE   III,VII: ptosis None   III,IV,VI: extraocular muscles   EOMI without nystagmus   V: mastication Normal   V: facial light touch sensation  Normal   V,VII: corneal reflex     VII: facial muscle function - upper  Normal   VII: facial muscle function - lower Normal   VIII: hearing Normal   IX: soft palate elevation  Normal   IX,X: gag reflex    XI: trapezius strength  5/5   XI: sternocleidomastoid strength 5/5   XI: neck extension strength  5/5   XII: tongue strength  Normal     Facial tics that resolved spontaneously seen a few times during this visit--- patient states is chronic    Motor:  5/5 throughout  Normal bulk and tone  No abnormal movements    Sensory:  LT intact throughout    Coordination:   FN, FFM and WALDO intact    Gait:  Not tested    DTR:   Right Brachioradialis reflex 2+  Left Brachioradialis reflex 2+  Right Biceps reflex 2+  Left Biceps reflex 2+  Right Triceps reflex 2+  Left Triceps reflex 2+  Right Quadriceps reflex 2+  Left Quadriceps reflex 2+  Right Achilles reflex 2+  Left Achilles reflex 2+    No Babinskis  No Shafer's    No pathological reflexes    Laboratory/Radiology:     CBC:   Lab Results   Component Value Date    WBC 3.6 04/20/2021    RBC 3.53 04/20/2021    HGB 10.5 04/20/2021    HCT 33.1 04/20/2021    MCV 93.8 04/20/2021    MCH 29.7 04/20/2021    MCHC 31.7 04/20/2021    RDW 14.1 04/20/2021     04/20/2021    MPV 9.4 04/20/2021     CMP:    Lab Results   Component Value Date     04/20/2021    K 4.6 04/20/2021     04/20/2021    CO2 24 04/20/2021    BUN 27 04/20/2021    CREATININE 1.6 04/20/2021    GFRAA 53 04/20/2021    LABGLOM 43 04/20/2021    GLUCOSE 83 04/20/2021    GLUCOSE 95 02/04/2011    PROT 8.4 04/19/2021    LABALBU 4.3 04/19/2021    LABALBU 4.6 02/04/2011    CALCIUM 9.3 04/20/2021    BILITOT 0.3 04/19/2021    ALKPHOS 70 04/19/2021    AST 22 04/19/2021    ALT 26 04/19/2021     U/A:    Lab Results   Component Value Date    COLORU Yellow 04/14/2021    PROTEINU Negative 04/14/2021    PHUR 5.5 04/14/2021    WBCUA 2-5 04/14/2021    RBCUA 1-3 04/14/2021    RBCUA NONE 10/24/2013    BACTERIA RARE 04/14/2021    CLARITYU Clear 04/14/2021    SPECGRAV 1.020 04/14/2021    LEUKOCYTESUR TRACE 04/14/2021    UROBILINOGEN 0.2 04/14/2021    BILIRUBINUR Negative 04/14/2021    BLOODU Negative 04/14/2021    GLUCOSEU Negative 04/14/2021     HgBA1c:    Lab Results   Component Value Date    LABA1C 4.7 04/16/2021     FLP:    Lab Results   Component Value Date    TRIG 67 04/16/2021    HDL 35 04/16/2021    LDLCALC 93 04/16/2021    LABVLDL 13 04/16/2021     TSH:    Lab Results   Component Value Date    TSH 2.870 04/14/2021   FOLATE:    Lab Results   Component Value Date    FOLATE 8.9 04/16/2021     CT head without contrast 4/14/2021: No acute intracranial abnormality    MRI brain without contrast 4/19/2021: There is no acute infarction, intracranial hemorrhage or intracranial mass lesion. Mild chronic microangiopathic ischemic disease. Mild generalized volume loss.     FL LUMBAR PUNCTURE DIAG    (Results Pending)       All labs and images personally reviewed today    Assessment:     Acute personality changes with manic behaviors   Now with flat affect, slow responses, intermittent weird speech and facial tics   Acute psychosis versus organic causes such as infectious process, FTD should also be considered   Normal ammonia, TSH, B12 and folate   MRI brain without contrast showed no acute findings    Plan:     Spoke with Dr. Roshan Gilmore on plan of care  Continue supportive care  LP ordered    CSF cell count, ALT, banding, miscellaneous 1 and 2, RPR, cytology   Send out 1 early onset Alzheimer's panel   Send out to includes 14-3-3 tau  RPR ordered  Routine EEG ordered      NATHALIE Daily NP-C   11:31 AM  4/21/2021

## 2021-04-22 ENCOUNTER — APPOINTMENT (OUTPATIENT)
Dept: GENERAL RADIOLOGY | Age: 66
DRG: 683 | End: 2021-04-22
Attending: FAMILY MEDICINE
Payer: MEDICARE

## 2021-04-22 ENCOUNTER — APPOINTMENT (OUTPATIENT)
Dept: NEUROLOGY | Age: 66
DRG: 683 | End: 2021-04-22
Attending: FAMILY MEDICINE
Payer: MEDICARE

## 2021-04-22 LAB
ANION GAP SERPL CALCULATED.3IONS-SCNC: 5 MMOL/L (ref 7–16)
APPEARANCE CSF: CLEAR
APPEARANCE CSF: CLEAR
BUN BLDV-MCNC: 27 MG/DL (ref 6–23)
CALCIUM SERPL-MCNC: 8.1 MG/DL (ref 8.6–10.2)
CHLORIDE BLD-SCNC: 113 MMOL/L (ref 98–107)
CO2: 24 MMOL/L (ref 22–29)
COLOR CSF: COLORLESS
COLOR CSF: COLORLESS
CREAT SERPL-MCNC: 1.2 MG/DL (ref 0.7–1.2)
CRYPTOCOCCUS NEOFORMANS/GATTII CSF BY PCR: NOT DETECTED
CYTOMEGALOVIRUS CSF BY PCR: NOT DETECTED
ENTEROVIRUS CSF BY PCR: NOT DETECTED
ESCHERICHIA COLI K1 CSF BY PCR: NOT DETECTED
GFR AFRICAN AMERICAN: >60
GFR NON-AFRICAN AMERICAN: >60 ML/MIN/1.73
GLUCOSE BLD-MCNC: 83 MG/DL (ref 74–99)
GLUCOSE, CSF: 55 MG/DL (ref 40–70)
GRAM STAIN ORDERABLE: NORMAL
HAEMOPHILUS INFLUENZAE CSF BY PCR: NOT DETECTED
HCT VFR BLD CALC: 29.8 % (ref 37–54)
HEMOGLOBIN: 9.8 G/DL (ref 12.5–16.5)
HERPES SIMPLEX VIRUS 1 CSF BY PCR: NOT DETECTED
HERPES SIMPLEX VIRUS 2 CSF BY PCR: NOT DETECTED
HUMAN HERPESVIRUS 6 CSF BY PCR: NOT DETECTED
HUMAN PARECHOVIRUS CSF BY PCR: NOT DETECTED
LISTERIA MONOCYTOGENES CSF BY PCR: NOT DETECTED
LV EF: 53 %
LVEF MODALITY: NORMAL
MCH RBC QN AUTO: 30.6 PG (ref 26–35)
MCHC RBC AUTO-ENTMCNC: 32.9 % (ref 32–34.5)
MCV RBC AUTO: 93.1 FL (ref 80–99.9)
MONOCYTE, CSF: 0 % (ref 10–70)
MONOCYTE, CSF: 50 % (ref 10–70)
NEISSERIA MENINGITIDIS CSF BY PCR: NOT DETECTED
NEUTROPHILS, CSF: 100 % (ref 0–10)
NEUTROPHILS, CSF: 50 % (ref 0–10)
PDW BLD-RTO: 14.2 FL (ref 11.5–15)
PLATELET # BLD: 215 E9/L (ref 130–450)
PMV BLD AUTO: 9.2 FL (ref 7–12)
POTASSIUM SERPL-SCNC: 3.6 MMOL/L (ref 3.5–5)
PROTEIN CSF: 41 MG/DL (ref 15–40)
RBC # BLD: 3.2 E12/L (ref 3.8–5.8)
RBC CSF: <2000 /UL
RBC CSF: <2000 /UL
RPR: NORMAL
SODIUM BLD-SCNC: 142 MMOL/L (ref 132–146)
STREPTOCOCCUS AGALACTIAE CSF BY PCR: NOT DETECTED
STREPTOCOCCUS PNEUMONIAE CSF BY PCR: NOT DETECTED
TUBE NUMBER CSF: ABNORMAL
TUBE NUMBER CSF: ABNORMAL
VARICELLA ZOSTER VIRUS CSF BY PCR: NOT DETECTED
WBC # BLD: 3.7 E9/L (ref 4.5–11.5)
WBC CSF: <3 /UL (ref 0–2)
WBC CSF: <3 /UL (ref 0–2)

## 2021-04-22 PROCEDURE — 93306 TTE W/DOPPLER COMPLETE: CPT

## 2021-04-22 PROCEDURE — 87205 SMEAR GRAM STAIN: CPT

## 2021-04-22 PROCEDURE — 80048 BASIC METABOLIC PNL TOTAL CA: CPT

## 2021-04-22 PROCEDURE — 009U3ZX DRAINAGE OF SPINAL CANAL, PERCUTANEOUS APPROACH, DIAGNOSTIC: ICD-10-PCS | Performed by: RADIOLOGY

## 2021-04-22 PROCEDURE — 82784 ASSAY IGA/IGD/IGG/IGM EACH: CPT

## 2021-04-22 PROCEDURE — 82945 GLUCOSE OTHER FLUID: CPT

## 2021-04-22 PROCEDURE — 89051 BODY FLUID CELL COUNT: CPT

## 2021-04-22 PROCEDURE — 2140000000 HC CCU INTERMEDIATE R&B

## 2021-04-22 PROCEDURE — 6370000000 HC RX 637 (ALT 250 FOR IP): Performed by: INTERNAL MEDICINE

## 2021-04-22 PROCEDURE — 2709999900 FL LUMBAR PUNCTURE DIAG

## 2021-04-22 PROCEDURE — 83916 OLIGOCLONAL BANDS: CPT

## 2021-04-22 PROCEDURE — 85027 COMPLETE CBC AUTOMATED: CPT

## 2021-04-22 PROCEDURE — 2580000003 HC RX 258: Performed by: INTERNAL MEDICINE

## 2021-04-22 PROCEDURE — 82040 ASSAY OF SERUM ALBUMIN: CPT

## 2021-04-22 PROCEDURE — 6370000000 HC RX 637 (ALT 250 FOR IP): Performed by: FAMILY MEDICINE

## 2021-04-22 PROCEDURE — 88108 CYTOPATH CONCENTRATE TECH: CPT

## 2021-04-22 PROCEDURE — 87070 CULTURE OTHR SPECIMN AEROBIC: CPT

## 2021-04-22 PROCEDURE — 87483 CNS DNA AMP PROBE TYPE 12-25: CPT

## 2021-04-22 PROCEDURE — 82042 OTHER SOURCE ALBUMIN QUAN EA: CPT

## 2021-04-22 PROCEDURE — 84157 ASSAY OF PROTEIN OTHER: CPT

## 2021-04-22 PROCEDURE — 95819 EEG AWAKE AND ASLEEP: CPT | Performed by: PSYCHIATRY & NEUROLOGY

## 2021-04-22 PROCEDURE — 95819 EEG AWAKE AND ASLEEP: CPT

## 2021-04-22 PROCEDURE — 2580000003 HC RX 258: Performed by: PSYCHIATRY & NEUROLOGY

## 2021-04-22 PROCEDURE — 2580000003 HC RX 258: Performed by: FAMILY MEDICINE

## 2021-04-22 PROCEDURE — 86593 SYPHILIS TEST NON-TREP QUANT: CPT

## 2021-04-22 PROCEDURE — 83873 ASSAY OF CSF PROTEIN: CPT

## 2021-04-22 PROCEDURE — 36415 COLL VENOUS BLD VENIPUNCTURE: CPT

## 2021-04-22 PROCEDURE — 86592 SYPHILIS TEST NON-TREP QUAL: CPT

## 2021-04-22 RX ORDER — POTASSIUM CHLORIDE 20 MEQ/1
40 TABLET, EXTENDED RELEASE ORAL ONCE
Status: COMPLETED | OUTPATIENT
Start: 2021-04-22 | End: 2021-04-22

## 2021-04-22 RX ADMIN — LEVOTHYROXINE SODIUM 75 MCG: 0.07 TABLET ORAL at 06:16

## 2021-04-22 RX ADMIN — Medication 10 ML: at 22:02

## 2021-04-22 RX ADMIN — MAGNESIUM GLUCONATE 500 MG ORAL TABLET 400 MG: 500 TABLET ORAL at 09:28

## 2021-04-22 RX ADMIN — QUETIAPINE FUMARATE 25 MG: 25 TABLET ORAL at 09:28

## 2021-04-22 RX ADMIN — SODIUM CHLORIDE: 9 INJECTION, SOLUTION INTRAVENOUS at 03:44

## 2021-04-22 RX ADMIN — ACETAMINOPHEN 650 MG: 325 TABLET ORAL at 06:20

## 2021-04-22 RX ADMIN — SODIUM CHLORIDE, PRESERVATIVE FREE 10 ML: 5 INJECTION INTRAVENOUS at 22:02

## 2021-04-22 RX ADMIN — RIVAROXABAN 15 MG: 15 TABLET, FILM COATED ORAL at 09:28

## 2021-04-22 RX ADMIN — QUETIAPINE FUMARATE 25 MG: 25 TABLET ORAL at 22:02

## 2021-04-22 RX ADMIN — POTASSIUM CHLORIDE 40 MEQ: 1500 TABLET, EXTENDED RELEASE ORAL at 10:23

## 2021-04-22 ASSESSMENT — PAIN SCALES - GENERAL
PAINLEVEL_OUTOF10: 4
PAINLEVEL_OUTOF10: 0
PAINLEVEL_OUTOF10: 0

## 2021-04-22 NOTE — PROGRESS NOTES
Hospitalist Progress Note      PCP: Ja Mayo    Date of Admission: 4/20/2021  Days in the hospital: 2    Hospital Course:   Patient is a 57-year-old with history of atrial fibrillation who was initially admitted to behavioral unit with complaints of psychosis. Patient was started on Zyprexa and Depakote. Patient later on developed acute kidney injury and so he was transferred to medical floor for IV fluid administration. He also was noted to have bradycardia and so consultation was placed to EP and the recommended continue to monitor for now and no intervention is planned. Patient was also seen by neurology and plan is for lumbar puncture and EEG. MRI did not show any acute findings. Patient was seen by psychiatry and no inpatient psych needs identified at this time. Subjective  Patient seen and examined at bedside. Had LP done, denies any headache or dizziness. Denies any hallucinations or delusions. He is afebrile. Appears fairly calm. Patient denies any fevers, chills, chest pain, shortness of breath, nausea, vomiting. Exam:    /68   Pulse (!) 42   Temp 97.4 °F (36.3 °C) (Temporal)   Resp 18   Ht 6' 3\" (1.905 m)   Wt 157 lb 9.6 oz (71.5 kg)   SpO2 100%   BMI 19.70 kg/m²     HEENT:  + Pallor, no icterus. Respiratory:  CTA, good air entry. Cardiovascular: RRR, no murmur. Abdomen: Soft, non-tender, BS noted. Musculoskeletal: No joint pains or joint swelling noted. Neurologic: awake, alert and following commands         Assessment/Plan:       Active Hospital Problems     Diagnosis Date Noted    ANJANA (acute kidney injury) ([facility] [address] [N17.9] 04/19/2021      · Bradycardia, asymptomatic  · Psychosis  · Hyperkalemia  · Atrial fibrillation  · Hypothyroidism     Plan:  ? Creatinine improving, continue to monitor, discontinue IV fluids  ? Continue to monitor heart rate, hemodynamically stable, no intervention planned, echocardiogram pending  ?  Follow-up with neurology, status post LP, follow results, EEG ordered, follow-up with neurology  ? No inpatient psych needs noted  ? Potassium improved  ? Heart rate controlled, continue anticoagulation  ? Continue Synthroid      Labs:   Recent Labs     04/20/21  0535 04/22/21  0551   WBC 3.6* 3.7*   HGB 10.5* 9.8*   HCT 33.1* 29.8*    215     Recent Labs     04/19/21  1545 04/20/21  0535 04/22/21  0550    139 142   K 5.2* 4.6 3.6    108* 113*   CO2 22 24 24   BUN 28* 27* 27*   CREATININE 1.9* 1.6* 1.2   CALCIUM 9.5 9.3 8.1*     Recent Labs     04/19/21  1015   AST 22   ALT 26   BILITOT 0.3   ALKPHOS 70     Recent Labs     04/21/21  1340   INR 1.7     Recent Labs     04/19/21  1015   CKTOTAL 124       Medications:  Reviewed    Infusion Medications    sodium chloride      sodium chloride 75 mL/hr at 04/22/21 0344    sodium chloride       Scheduled Medications    sodium chloride flush  5-40 mL Intravenous 2 times per day    levothyroxine  75 mcg Oral Daily    magnesium oxide  400 mg Oral Daily    QUEtiapine  25 mg Oral BID    sodium chloride flush  5-40 mL Intravenous 2 times per day    rivaroxaban  15 mg Oral Daily     PRN Meds: sodium chloride flush, sodium chloride, sodium chloride flush, sodium chloride, promethazine **OR** ondansetron, polyethylene glycol, acetaminophen **OR** acetaminophen, perflutren lipid microspheres      Intake/Output Summary (Last 24 hours) at 4/22/2021 0950  Last data filed at 4/22/2021 0616  Gross per 24 hour   Intake 883.43 ml   Output 625 ml   Net 258.43 ml     · Body mass index is 19.7 kg/m². · Diet  · DIET GENERAL;    · Code Status  · Full Code         Electronically signed by Sherri Perez MD on 4/22/2021 at 9:50 AM  [employer]   Please contact me through perfect serve    NOTE: This report was transcribed using voice recognition software. Every effort was made to ensure accuracy; however, inadvertent computerized transcription errors may be present.

## 2021-04-22 NOTE — PROGRESS NOTES
Received call from patient's wife stated she was talking to the patient on the phone and he stated he started to feel a \"spell\" come on. Went into patient's room and patient was seen just staring into space, would not answer when I called his name. Left the room to get assistance, and when I got back patient was alert, looking at me, he stated he was sorry he \"lost it\" for a minute. Per another RN he has these spells often.          Nadia Gramajo RN

## 2021-04-22 NOTE — PATIENT CARE CONFERENCE
P Quality Flow/Interdisciplinary Rounds Progress Note        Quality Flow Rounds held on April 22, 2021    Disciplines Attending:  Bedside Nurse, ,  and Nursing Unit [address] [place] [address] was admitted on 4/20/2021 12:51 AM    Anticipated Discharge Date:  Expected Discharge Date: 04/23/21    Disposition:    Abram Score:  Abram Scale Score: 21    Readmission Risk              Risk of Unplanned Readmission:        17           Discussed patient goal for the day, patient clinical progression, and barriers to discharge.   The following Goal(s) of the Day/Commitment(s) have been identified:  Labs - Report Results      Arturo Calle  April 22, 2021

## 2021-04-22 NOTE — CARE COORDINATION
EEG and LP ordered per neurology. 2d echo ordered - dept called to have it done asap. Met with pt in room and spoke with pt's significant other, Chris,via the phone. Per psych, Patient is not suicidal homicidal psychotic or manic does not meet criteria for inpatient level psychiatric treatment. This patient to follow-up on an outpatient (psych)basis. Chris is planning on bringing pt home at discharge. Voiced no needs for home at present.  Sw/allyson will follow

## 2021-04-22 NOTE — PROCEDURES
EEG Report  Amina Sesay is a 76 y.o. male      Appointment Date 4/22/2021     Appointment Time 10:45am   [facility] EEG Number [id number]   Type of Study routine Floor 6507-A     Technical Specifications  Technician [facility] of consciousness Awake/sleep   Sleep deprived? no   Hyperventilation tested? no   Photic stim tested?  yes   EEG recording Standard 10-20 electrode placement    Duration of recording 25 mins   EEG complete? yes       Clinical History  Hospital Problems           Last Modified POA    ANJANA (acute kidney injury) ([facility] [address] 4/20/2021 Yes    Transient confusion (resolving) 4/21/2021 Yes            Medications    Current Facility-Administered Medications:     sodium chloride flush 0.9 % injection 5-40 mL, 5-40 mL, Intravenous, 2 times per day, Marcos Padilla MD    sodium chloride flush 0.9 % injection 5-40 mL, 5-40 mL, Intravenous, PRN, Marcos Padilla MD    0.9 % sodium chloride infusion, 25 mL, Intravenous, PRN, Marcos Padilla MD    levothyroxine (SYNTHROID) tablet 75 mcg, 75 mcg, Oral, Daily, Denisse Perdomo MD, 75 mcg at 04/22/21 9796    magnesium oxide (MAG-OX) tablet 400 mg, 400 mg, Oral, Daily, Denisse Perdomo MD, 400 mg at 04/22/21 5891    QUEtiapine (SEROQUEL) tablet 25 mg, 25 mg, Oral, BID, Denisse Perdomo MD, 25 mg at 04/22/21 8590    sodium chloride flush 0.9 % injection 5-40 mL, 5-40 mL, Intravenous, 2 times per day, Denisse Perdomo MD, 10 mL at 04/21/21 0829    sodium chloride flush 0.9 % injection 5-40 mL, 5-40 mL, Intravenous, PRN, Denisse Perdomo MD    0.9 % sodium chloride infusion, 25 mL, Intravenous, PRN, Denisse Perdomo MD    promethazine (PHENERGAN) tablet 12.5 mg, 12.5 mg, Oral, Q6H PRN **OR** ondansetron (ZOFRAN) injection 4 mg, 4 mg, Intravenous, Q6H PRN, Denisse Perdomo MD    polyethylene glycol [facility]) packet 17 g, 17 g, Oral, Daily PRN, Denisse Perdomo MD    acetaminophen (TYLENOL) tablet 650 mg, 650 mg, Oral, Q6H PRN, 650 mg at 04/22/21 0620 **OR** acetaminophen (TYLENOL) suppository 650 mg, 650 mg, Rectal, Q6H PRN, Tamika Boateng MD    rivaroxaban (XARELTO) tablet 15 mg, 15 mg, Oral, Daily, Tamika Boateng MD, 15 mg at 04/22/21 6241    perflutren lipid microspheres (DEFINITY) injection 1.65 mg, 1.5 mL, Intravenous, ONCE PRN, Tamkia Boateng MD        Physician Interpretation    General EEG Report  There is a noral 8-9 Hz PDR    Type of EEG >>  Routine, normal               General Impression  This is a normal routine EEG. No epileptiform activity or lateralizing signs are seen.    Genetta Jeans, MD Monika Craven

## 2021-04-23 VITALS
WEIGHT: 158.2 LBS | BODY MASS INDEX: 19.67 KG/M2 | TEMPERATURE: 98.1 F | SYSTOLIC BLOOD PRESSURE: 115 MMHG | RESPIRATION RATE: 18 BRPM | HEIGHT: 75 IN | DIASTOLIC BLOOD PRESSURE: 77 MMHG | OXYGEN SATURATION: 100 % | HEART RATE: 51 BPM

## 2021-04-23 LAB
ANION GAP SERPL CALCULATED.3IONS-SCNC: 9 MMOL/L (ref 7–16)
BUN BLDV-MCNC: 25 MG/DL (ref 6–23)
CALCIUM SERPL-MCNC: 8.4 MG/DL (ref 8.6–10.2)
CHLORIDE BLD-SCNC: 110 MMOL/L (ref 98–107)
CO2: 21 MMOL/L (ref 22–29)
CREAT SERPL-MCNC: 1.2 MG/DL (ref 0.7–1.2)
GFR AFRICAN AMERICAN: >60
GFR NON-AFRICAN AMERICAN: >60 ML/MIN/1.73
GLUCOSE BLD-MCNC: 78 MG/DL (ref 74–99)
HCT VFR BLD CALC: 31.1 % (ref 37–54)
HEMOGLOBIN: 10 G/DL (ref 12.5–16.5)
MCH RBC QN AUTO: 29.8 PG (ref 26–35)
MCHC RBC AUTO-ENTMCNC: 32.2 % (ref 32–34.5)
MCV RBC AUTO: 92.6 FL (ref 80–99.9)
PDW BLD-RTO: 14.3 FL (ref 11.5–15)
PLATELET # BLD: 217 E9/L (ref 130–450)
PMV BLD AUTO: 9.6 FL (ref 7–12)
POTASSIUM SERPL-SCNC: 3.8 MMOL/L (ref 3.5–5)
RBC # BLD: 3.36 E12/L (ref 3.8–5.8)
SODIUM BLD-SCNC: 140 MMOL/L (ref 132–146)
WBC # BLD: 4.8 E9/L (ref 4.5–11.5)

## 2021-04-23 PROCEDURE — 6370000000 HC RX 637 (ALT 250 FOR IP): Performed by: FAMILY MEDICINE

## 2021-04-23 PROCEDURE — 2580000003 HC RX 258: Performed by: PSYCHIATRY & NEUROLOGY

## 2021-04-23 PROCEDURE — 36415 COLL VENOUS BLD VENIPUNCTURE: CPT

## 2021-04-23 PROCEDURE — 2580000003 HC RX 258: Performed by: FAMILY MEDICINE

## 2021-04-23 PROCEDURE — 85027 COMPLETE CBC AUTOMATED: CPT

## 2021-04-23 PROCEDURE — 80048 BASIC METABOLIC PNL TOTAL CA: CPT

## 2021-04-23 RX ADMIN — Medication 10 ML: at 08:32

## 2021-04-23 RX ADMIN — MAGNESIUM GLUCONATE 500 MG ORAL TABLET 400 MG: 500 TABLET ORAL at 08:32

## 2021-04-23 RX ADMIN — ACETAMINOPHEN 650 MG: 325 TABLET ORAL at 15:30

## 2021-04-23 RX ADMIN — SODIUM CHLORIDE, PRESERVATIVE FREE 10 ML: 5 INJECTION INTRAVENOUS at 08:32

## 2021-04-23 RX ADMIN — QUETIAPINE FUMARATE 25 MG: 25 TABLET ORAL at 08:32

## 2021-04-23 RX ADMIN — LEVOTHYROXINE SODIUM 75 MCG: 0.07 TABLET ORAL at 07:23

## 2021-04-23 RX ADMIN — RIVAROXABAN 15 MG: 15 TABLET, FILM COATED ORAL at 08:32

## 2021-04-23 ASSESSMENT — PAIN SCALES - GENERAL
PAINLEVEL_OUTOF10: 4
PAINLEVEL_OUTOF10: 0

## 2021-04-23 NOTE — PROGRESS NOTES
[address]  Phone: [phone]             April 23, 2021    Patient: Lou Solis   YOB: 1955   Date of Visit: 4/20/2021       To Whom It May Concern:    Mery Olmedo is being treated in our facility  beginning 4/20/2021           Sincerely,       Audrey Mccracken RN         Signature:__________________________________

## 2021-04-23 NOTE — PLAN OF CARE
Problem: Mental Status - Impaired:  Goal: Mental status will improve  Description: Mental status will improve  Outcome: Met This Shift     Problem: Anxiety:  Goal: Level of anxiety will decrease  Description: Level of anxiety will decrease  Outcome: Met This Shift     Problem: Cardiac:  Goal: Ability to maintain an adequate cardiac output will improve  Description: Ability to maintain an adequate cardiac output will improve  Outcome: Met This Shift  Goal: Hemodynamic stability will improve  Description: Hemodynamic stability will improve  Outcome: Met This Shift     Problem: Fluid Volume:  Goal: Ability to achieve and maintain adequate urine output will improve  Description: Ability to achieve and maintain adequate urine output will improve  Outcome: Met This Shift     Problem: Respiratory:  Goal: Respiratory status will improve  Description: Respiratory status will improve  Outcome: Met This Shift

## 2021-04-23 NOTE — PATIENT CARE CONFERENCE
P Quality Flow/Interdisciplinary Rounds Progress Note        Quality Flow Rounds held on April 23, 2021    Disciplines Attending:  Bedside Nurse, ,  and Nursing Unit [address] [place] [address] was admitted on 4/20/2021 12:51 AM    Anticipated Discharge Date:  Expected Discharge Date: 04/23/21    Disposition:    Abram Score:  Abram Scale Score: 22    Readmission Risk              Risk of Unplanned Readmission:        18           Discussed patient goal for the day, patient clinical progression, and barriers to discharge.   The following Goal(s) of the Day/Commitment(s) have been identified:  Labs - Report Results      Mercy Dove  April 23, 2021

## 2021-04-23 NOTE — PLAN OF CARE
Problem: Mental Status - Impaired:  Goal: Mental status will improve  Description: Mental status will improve  4/23/2021 1803 by Aj Pillai RN  Outcome: Completed     Problem: Anxiety:  Goal: Level of anxiety will decrease  Description: Level of anxiety will decrease  4/23/2021 1803 by Aj Pillai RN  Outcome: Completed     Problem: Cardiac:  Goal: Ability to maintain an adequate cardiac output will improve  Description: Ability to maintain an adequate cardiac output will improve  4/23/2021 1803 by Aj Pillai RN  Outcome: Completed     Problem: Cardiac:  Goal: Hemodynamic stability will improve  Description: Hemodynamic stability will improve  4/23/2021 1803 by Aj Pillai RN  Outcome: Completed     Problem: Fluid Volume:  Goal: Ability to achieve and maintain adequate urine output will improve  Description: Ability to achieve and maintain adequate urine output will improve  4/23/2021 1803 by Aj Pillai RN  Outcome: Completed     Problem: Respiratory:  Goal: Respiratory status will improve  Description: Respiratory status will improve  4/23/2021 1803 by Aj Pillai RN  Outcome: Completed

## 2021-04-23 NOTE — PLAN OF CARE
LP reviewed and unrevealing so far. Other labs will take several days to come back.      Based on extensive inpatient testing including MRI brain, EEG and LP all of which were unrevealing, a primary psychiatric d/o should be considered vs possible early onset dementia    He will need neuropsych testing completed as OP     Okay to d/c from neurology POV    Follow up OP for lab results     Neuro will sign off, please call with questions or new issues     Jean Blank PA-C

## 2021-04-23 NOTE — DISCHARGE SUMMARY
Patient was seen by psychiatry and no inpatient psych needs identified at this time. Patient also seen by neurology and patient had EEG and LP done which were inconclusive. Neurology recommended outpatient follow-up. Patient also will need to follow-up with psychiatry as outpatient. Patient today is alert, oriented, denies any headache, dizziness, chest pain. He is afebrile. Chart reviewed, night events reviewed. Consults:     IP CONSULT TO ELECTROPHYSIOLOGY  IP CONSULT TO PSYCHIATRY    Significant Diagnostic Studies:  As above      Discharge Instructions/Follow-up:  As above       Activity: activity as tolerated    Physical Exam:  Vitals:    04/23/21 1145   BP: (!) 93/55   Pulse: (!) 46   Resp: 16   Temp: 98.2 °F (36.8 °C)   SpO2: 99%       General appearance: No apparent distress, appears stated age and cooperative. Respiratory:  Clear to auscultation, good air entry. Cardiovascular: RRR, no murmur. Abdomen: Soft, non-tender, non-distended with normal bowel sounds. Neurologic: awake, alert and following commands     Labs:  For convenience and continuity at follow-up the following most recent labs are provided:      CBC:    Lab Results   Component Value Date    WBC 4.8 04/23/2021    HGB 10.0 04/23/2021    HCT 31.1 04/23/2021     04/23/2021       Renal:    Lab Results   Component Value Date     04/23/2021    K 3.8 04/23/2021    K 4.6 04/20/2021     04/23/2021    CO2 21 04/23/2021    BUN 25 04/23/2021    CREATININE 1.2 04/23/2021    CALCIUM 8.4 04/23/2021    PHOS 3.7 04/16/2021         Discharge Medications:     Current Discharge Medication List           Details   QUEtiapine (SEROQUEL) 25 MG tablet Take 25 mg by mouth 2 times daily       magnesium oxide (MAG-OX) 400 MG tablet Take 400 mg by mouth daily      levothyroxine (SYNTHROID) 75 MCG tablet Take 75 mcg by mouth daily      rivaroxaban (XARELTO) 20 MG TABS tablet Take 20 mg by mouth daily Last dose 5/29/2019             Time Spent on discharge is more than 31 min in the examination, evaluation, counseling and review of medications and discharge plan. Signed:    Anthony Ruff MD   4/23/2021      Thank you Marlyn Jefferson for the opportunity to be involved in this patient's care. If you have any questions or concerns please feel free to contact me. NOTE: This report was transcribed using voice recognition software. Every effort was made to ensure accuracy; however, inadvertent computerized transcription errors may be present.

## 2021-04-23 NOTE — PLAN OF CARE
Problem: Mental Status - Impaired:  Goal: Mental status will improve  Description: Mental status will improve  4/23/2021 1154 by Irma De La Paz RN  Outcome: Met This Shift     Problem: Cardiac:  Goal: Ability to maintain an adequate cardiac output will improve  Description: Ability to maintain an adequate cardiac output will improve  4/23/2021 1154 by Irma De La Paz RN  Outcome: Met This Shift

## 2021-04-25 LAB — MYELIN BASIC PROTEIN, CSF: 3.24 NG/ML (ref 0–5.5)

## 2021-04-26 LAB
CSF CULTURE: NORMAL
GRAM STAIN RESULT: NORMAL

## 2021-04-28 LAB
ALBUMIN CSF: 24 MG/DL (ref 0–35)
ALBUMIN INDEX: 6.4 RATIO (ref 0–9)
ALBUMIN, SERUM BY NEPHELOMETRY: 3771 MG/DL (ref 3500–5200)
IGG CSF: 6 MG/DL (ref 0–6)
IGG INDEX: 0.4 RATIO (ref 0.28–0.66)
IGG SYNTHESIS RATE CSF: <0 MG/D
IGG/ALBUMIN CSF: 0.25 RATIO (ref 0.09–0.25)
IGG: 2380 MG/DL (ref 768–1632)
MULTIPLE SCLEROSIS PANEL: ABNORMAL
OLIGOCLONAL BANDS CSF: NEGATIVE
OLIGOCLONAL BANDS NUMBER: 0 BANDS (ref 0–1)

## 2021-05-04 DIAGNOSIS — K91.2 MALNUTRITION FOLLOWING GASTROINTESTINAL SURGERY: Primary | ICD-10-CM

## 2021-05-07 LAB
VDRL CSF SCREEN: NON REACTIVE
VDRL TITER CSF: NORMAL

## 2021-05-10 ENCOUNTER — HOSPITAL ENCOUNTER (OUTPATIENT)
Dept: INFUSION THERAPY | Age: 66
Setting detail: INFUSION SERIES
Discharge: HOME | End: 2021-05-10
Payer: MEDICARE

## 2021-05-10 VITALS
RESPIRATION RATE: 24 BRPM | DIASTOLIC BLOOD PRESSURE: 70 MMHG | TEMPERATURE: 98 F | HEART RATE: 66 BPM | SYSTOLIC BLOOD PRESSURE: 123 MMHG | OXYGEN SATURATION: 100 %

## 2021-05-10 DIAGNOSIS — E53.8 VITAMIN B12 DEFICIENCY: Primary | ICD-10-CM

## 2021-05-10 PROCEDURE — 6360000002 HC RX W HCPCS: Performed by: SURGERY

## 2021-05-10 PROCEDURE — 96372 THER/PROPH/DIAG INJ SC/IM: CPT

## 2021-05-10 RX ORDER — CYANOCOBALAMIN 1000 UG/ML
1000 INJECTION INTRAMUSCULAR; SUBCUTANEOUS ONCE
Status: COMPLETED
Start: 2021-05-10 | End: 2021-05-10

## 2021-05-10 RX ORDER — CYANOCOBALAMIN 1000 UG/ML
1000 INJECTION INTRAMUSCULAR; SUBCUTANEOUS ONCE
Status: CANCELLED
Start: 2021-05-13

## 2021-05-10 RX ADMIN — CYANOCOBALAMIN 1000 MCG: 1000 INJECTION, SOLUTION INTRAMUSCULAR; SUBCUTANEOUS at 09:08

## 2021-05-12 DIAGNOSIS — E86.0 DEHYDRATION: ICD-10-CM

## 2021-05-12 DIAGNOSIS — R11.15 INTRACTABLE CYCLICAL VOMITING WITH NAUSEA: ICD-10-CM

## 2021-05-12 DIAGNOSIS — K91.2 MALNUTRITION FOLLOWING GASTROINTESTINAL SURGERY: ICD-10-CM

## 2021-05-12 LAB
ALBUMIN SERPL-MCNC: 3.3 G/DL (ref 3.5–5.2)
ALP BLD-CCNC: 52 U/L (ref 40–129)
ALT SERPL-CCNC: 24 U/L (ref 0–40)
ANION GAP SERPL CALCULATED.3IONS-SCNC: 7 MMOL/L (ref 7–16)
AST SERPL-CCNC: 29 U/L (ref 0–39)
BILIRUB SERPL-MCNC: 0.3 MG/DL (ref 0–1.2)
BUN BLDV-MCNC: 16 MG/DL (ref 6–23)
CALCIUM SERPL-MCNC: 8.3 MG/DL (ref 8.6–10.2)
CHLORIDE BLD-SCNC: 106 MMOL/L (ref 98–107)
CHOLESTEROL, TOTAL: 173 MG/DL (ref 0–199)
CO2: 26 MMOL/L (ref 22–29)
CREAT SERPL-MCNC: 1.2 MG/DL (ref 0.7–1.2)
FERRITIN: 115 NG/ML
FOLATE: 12.4 NG/ML (ref 4.8–24.2)
GFR AFRICAN AMERICAN: >60
GFR NON-AFRICAN AMERICAN: >60 ML/MIN/1.73
GLUCOSE BLD-MCNC: 80 MG/DL (ref 74–99)
HCT VFR BLD CALC: 33.7 % (ref 37–54)
HEMOGLOBIN: 10.3 G/DL (ref 12.5–16.5)
MCH RBC QN AUTO: 29.9 PG (ref 26–35)
MCHC RBC AUTO-ENTMCNC: 30.6 % (ref 32–34.5)
MCV RBC AUTO: 97.7 FL (ref 80–99.9)
PDW BLD-RTO: 15.6 FL (ref 11.5–15)
PLATELET # BLD: 345 E9/L (ref 130–450)
PMV BLD AUTO: 9.4 FL (ref 7–12)
POTASSIUM SERPL-SCNC: 4.6 MMOL/L (ref 3.5–5)
PREALBUMIN: 30 MG/DL (ref 20–40)
RBC # BLD: 3.45 E12/L (ref 3.8–5.8)
SODIUM BLD-SCNC: 139 MMOL/L (ref 132–146)
TOTAL PROTEIN: 6.8 G/DL (ref 6.4–8.3)
TRIGL SERPL-MCNC: 76 MG/DL (ref 0–149)
VITAMIN B-12: >2000 PG/ML (ref 211–946)
VITAMIN D 25-HYDROXY: 38 NG/ML (ref 30–100)
WBC # BLD: 3.6 E9/L (ref 4.5–11.5)

## 2021-05-15 LAB — ZINC: 66.4 UG/DL (ref 60–120)

## 2021-05-17 LAB — VITAMIN B1 WHOLE BLOOD: 88 NMOL/L (ref 70–180)

## 2021-06-07 ENCOUNTER — HOSPITAL ENCOUNTER (OUTPATIENT)
Dept: INFUSION THERAPY | Age: 66
Setting detail: INFUSION SERIES
Discharge: HOME | End: 2021-06-07
Payer: MEDICARE

## 2021-06-07 DIAGNOSIS — E53.8 VITAMIN B12 DEFICIENCY: Primary | ICD-10-CM

## 2021-06-07 PROCEDURE — 6360000002 HC RX W HCPCS: Performed by: SURGERY

## 2021-06-07 PROCEDURE — 96372 THER/PROPH/DIAG INJ SC/IM: CPT

## 2021-06-07 RX ORDER — CYANOCOBALAMIN 1000 UG/ML
1000 INJECTION INTRAMUSCULAR; SUBCUTANEOUS ONCE
Status: CANCELLED
Start: 2021-07-05

## 2021-06-07 RX ORDER — CYANOCOBALAMIN 1000 UG/ML
1000 INJECTION INTRAMUSCULAR; SUBCUTANEOUS ONCE
Status: COMPLETED
Start: 2021-06-07 | End: 2021-06-07

## 2021-06-07 RX ADMIN — CYANOCOBALAMIN 1000 MCG: 1000 INJECTION, SOLUTION INTRAMUSCULAR at 11:25

## 2021-06-21 ENCOUNTER — TELEPHONE (OUTPATIENT)
Dept: BARIATRICS/WEIGHT MGMT | Age: 66
End: 2021-06-21

## 2021-06-21 NOTE — TELEPHONE ENCOUNTER
Patient called in and is complaining of being dehydrated. He is not getting more than 23 bottles of water in a day. I explained I couldn't order IV therapy because he hasn't been seen for so long. He also stated he is sick and anemic. Appointment set.

## 2021-06-23 ENCOUNTER — TELEPHONE (OUTPATIENT)
Dept: BARIATRICS/WEIGHT MGMT | Age: 66
End: 2021-06-23

## 2021-06-23 ENCOUNTER — OFFICE VISIT (OUTPATIENT)
Dept: BARIATRICS/WEIGHT MGMT | Age: 66
End: 2021-06-23
Payer: MEDICARE

## 2021-06-23 VITALS
DIASTOLIC BLOOD PRESSURE: 64 MMHG | HEART RATE: 53 BPM | RESPIRATION RATE: 20 BRPM | HEIGHT: 75 IN | WEIGHT: 171 LBS | BODY MASS INDEX: 21.26 KG/M2 | TEMPERATURE: 98.3 F | SYSTOLIC BLOOD PRESSURE: 94 MMHG

## 2021-06-23 DIAGNOSIS — K21.9 GASTROESOPHAGEAL REFLUX DISEASE WITHOUT ESOPHAGITIS: ICD-10-CM

## 2021-06-23 DIAGNOSIS — Z12.11 ENCOUNTER FOR SCREENING COLONOSCOPY: Primary | ICD-10-CM

## 2021-06-23 DIAGNOSIS — E86.0 DEHYDRATION: Primary | ICD-10-CM

## 2021-06-23 PROCEDURE — 99211 OFF/OP EST MAY X REQ PHY/QHP: CPT

## 2021-06-23 PROCEDURE — 99214 OFFICE O/P EST MOD 30 MIN: CPT | Performed by: SURGERY

## 2021-06-23 PROCEDURE — 96360 HYDRATION IV INFUSION INIT: CPT | Performed by: SURGERY

## 2021-06-23 RX ORDER — SODIUM CHLORIDE 0.9 % (FLUSH) 0.9 %
5-40 SYRINGE (ML) INJECTION PRN
Status: CANCELLED | OUTPATIENT
Start: 2021-06-23

## 2021-06-23 RX ORDER — MIRTAZAPINE 15 MG/1
TABLET, FILM COATED ORAL
COMMUNITY
Start: 2021-06-02

## 2021-06-23 RX ORDER — SODIUM CHLORIDE 9 MG/ML
INJECTION, SOLUTION INTRAVENOUS ONCE
Status: CANCELLED
Start: 2021-06-23 | End: 2021-06-23

## 2021-06-23 RX ORDER — HEPARIN SODIUM (PORCINE) LOCK FLUSH IV SOLN 100 UNIT/ML 100 UNIT/ML
500 SOLUTION INTRAVENOUS PRN
Status: CANCELLED | OUTPATIENT
Start: 2021-06-23

## 2021-06-23 NOTE — PROGRESS NOTES
Surgery Progress Note            Chief complaint:   Chief Complaint   Patient presents with    Other     Dehydration      Patient Active Problem List   Diagnosis    Gastroesophageal reflux disease without esophagitis    Chest pain    S/P gastric bypass    ABENA (obstructive sleep apnea)    High cholesterol    Atrial fibrillation (HCC)    Dehydration    Non-intractable cyclical vomiting with nausea    Intractable cyclical vomiting with nausea    Vitamin B12 deficiency    Vitamin B12 deficiency (non anemic)    Acute psychosis ([facility] [address]    ANJANA (acute kidney injury) ([facility] [address]    Altered mental status    Transient confusion (resolving)       S: feeling very poor and has had low blood counts    O:   Vitals:    06/23/21 1317   BP: 94/64   Pulse: 53   Resp: 20   Temp: 98.3 °F (36.8 °C)     No intake or output data in the 24 hours ending 06/23/21 1330        Labs:  Lab Results   Component Value Date    WBC 3.6 05/12/2021    WBC 4.8 04/23/2021    WBC 3.7 04/22/2021    HGB 10.3 05/12/2021    HGB 10.0 04/23/2021    HGB 9.8 04/22/2021    HCT 33.7 05/12/2021    HCT 31.1 04/23/2021    HCT 29.8 04/22/2021     Lab Results   Component Value Date    CREATININE 1.2 05/12/2021    BUN 16 05/12/2021     05/12/2021    K 4.6 05/12/2021     05/12/2021    CO2 26 05/12/2021     No results found for: LIPASE, AMYLASE      Physical exam:   BP 94/64 (Site: Right Upper Arm, Position: Sitting, Cuff Size: Medium Adult)   Pulse 53   Temp 98.3 °F (36.8 °C) (Temporal)   Resp 20   Ht 6' 3\" (1.905 m)   Wt 171 lb (77.6 kg)   BMI 21.37 kg/m²   General appearance: NAD  Head: NCAT  Neck: supple, no masses  Lungs: equal chest rise bilateral  Heart: S1S2 present  Abdomen: soft, nontender, nondistended  Skin; no lesions  Gu: no cva tenderness  Extremities: extremities normal, atraumatic, no cyanosis or edema    A:  Dehydration and anemia s/p rygb     P: will set up for iv hydration with multivitamin and will send to  [facility] for evaluation for colonoscopy      Floyd Hdz MD, MD  6/23/2021

## 2021-06-23 NOTE — PATIENT INSTRUCTIONS
Please continue to take your vitamin and mineral supplements as instructed. If you received a blood work prescription today for laboratory monitoring due prior to your next routine follow-up visit, please have this blood work obtained 10 to 14 days prior to your next visit. It is important to fast for 12 hours prior to routine weight loss surgery blood work, EXCEPT for drinking water, to ensure accuracy of results. Please report nausea, vomiting, abdominal pain, or any other problems you experience to your surgeon. For problems related to weight loss surgery, it is best to go to [address] [place] [address] Emergency Department and have your surgeon paged.

## 2021-06-23 NOTE — TELEPHONE ENCOUNTER
Hydration set for thurs 6/24 at [facility] and ref sent to dr Tori Funez for endoscopy and colonoscopy.

## 2021-06-25 ENCOUNTER — HOSPITAL ENCOUNTER (OUTPATIENT)
Dept: INFUSION THERAPY | Age: 66
Setting detail: INFUSION SERIES
Discharge: HOME | End: 2021-06-25
Payer: MEDICARE

## 2021-06-25 VITALS
TEMPERATURE: 97.6 F | SYSTOLIC BLOOD PRESSURE: 95 MMHG | RESPIRATION RATE: 18 BRPM | DIASTOLIC BLOOD PRESSURE: 61 MMHG | HEART RATE: 61 BPM | OXYGEN SATURATION: 98 %

## 2021-06-25 DIAGNOSIS — E86.0 DEHYDRATION: Primary | ICD-10-CM

## 2021-06-25 PROCEDURE — 2500000003 HC RX 250 WO HCPCS: Performed by: SURGERY

## 2021-06-25 PROCEDURE — 96365 THER/PROPH/DIAG IV INF INIT: CPT

## 2021-06-25 PROCEDURE — 2580000003 HC RX 258: Performed by: SURGERY

## 2021-06-25 PROCEDURE — 96361 HYDRATE IV INFUSION ADD-ON: CPT

## 2021-06-25 RX ORDER — SODIUM CHLORIDE 9 MG/ML
INJECTION, SOLUTION INTRAVENOUS ONCE
Status: COMPLETED | OUTPATIENT
Start: 2021-06-25 | End: 2021-06-25

## 2021-06-25 RX ORDER — SODIUM CHLORIDE 0.9 % (FLUSH) 0.9 %
5-40 SYRINGE (ML) INJECTION PRN
Status: CANCELLED | OUTPATIENT
Start: 2021-06-25

## 2021-06-25 RX ORDER — SODIUM CHLORIDE 9 MG/ML
INJECTION, SOLUTION INTRAVENOUS ONCE
Status: CANCELLED
Start: 2021-06-25 | End: 2021-06-25

## 2021-06-25 RX ORDER — HEPARIN SODIUM (PORCINE) LOCK FLUSH IV SOLN 100 UNIT/ML 100 UNIT/ML
500 SOLUTION INTRAVENOUS PRN
Status: CANCELLED | OUTPATIENT
Start: 2021-06-25

## 2021-06-25 RX ORDER — SODIUM CHLORIDE 0.9 % (FLUSH) 0.9 %
5-40 SYRINGE (ML) INJECTION PRN
Status: DISCONTINUED | OUTPATIENT
Start: 2021-06-25 | End: 2021-06-26 | Stop reason: HOSPADM

## 2021-06-25 RX ORDER — HEPARIN SODIUM (PORCINE) LOCK FLUSH IV SOLN 100 UNIT/ML 100 UNIT/ML
500 SOLUTION INTRAVENOUS PRN
Status: DISCONTINUED | OUTPATIENT
Start: 2021-06-25 | End: 2021-06-26 | Stop reason: HOSPADM

## 2021-06-25 RX ADMIN — SODIUM CHLORIDE: 9 INJECTION, SOLUTION INTRAVENOUS at 13:23

## 2021-06-25 RX ADMIN — Medication 10 ML: at 12:22

## 2021-06-25 RX ADMIN — ASCORBIC ACID, VITAMIN A PALMITATE, CHOLECALCIFEROL, THIAMINE HYDROCHLORIDE, RIBOFLAVIN-5 PHOSPHATE SODIUM, PYRIDOXINE HYDROCHLORIDE, NIACINAMIDE, DEXPANTHENOL, ALPHA-TOCOPHEROL ACETATE, VITAMIN K1, FOLIC ACID, BIOTIN, CYANOCOBALAMIN: 200; 3300; 200; 6; 3.6; 6; 40; 15; 10; 150; 600; 60; 5 INJECTION, SOLUTION INTRAVENOUS at 12:22

## 2021-06-25 RX ADMIN — Medication 10 ML: at 14:35

## 2021-06-29 ENCOUNTER — TELEPHONE (OUTPATIENT)
Dept: BARIATRICS/WEIGHT MGMT | Age: 66
End: 2021-06-29

## 2021-07-06 ENCOUNTER — HOSPITAL ENCOUNTER (OUTPATIENT)
Dept: INFUSION THERAPY | Age: 66
Setting detail: INFUSION SERIES
Discharge: HOME | End: 2021-07-06
Payer: MEDICARE

## 2021-07-06 DIAGNOSIS — E53.8 VITAMIN B12 DEFICIENCY: Primary | ICD-10-CM

## 2021-07-06 PROCEDURE — 96372 THER/PROPH/DIAG INJ SC/IM: CPT

## 2021-07-06 PROCEDURE — 6360000002 HC RX W HCPCS: Performed by: SURGERY

## 2021-07-06 RX ORDER — CYANOCOBALAMIN 1000 UG/ML
1000 INJECTION INTRAMUSCULAR; SUBCUTANEOUS ONCE
Status: CANCELLED
Start: 2021-08-02

## 2021-07-06 RX ORDER — CYANOCOBALAMIN 1000 UG/ML
1000 INJECTION INTRAMUSCULAR; SUBCUTANEOUS ONCE
Status: COMPLETED
Start: 2021-07-06 | End: 2021-07-06

## 2021-07-06 RX ADMIN — CYANOCOBALAMIN 1000 MCG: 1000 INJECTION, SOLUTION INTRAMUSCULAR; SUBCUTANEOUS at 10:09

## 2021-07-09 LAB
CRYPTOSPORIDIUM ANTIGEN STOOL: NORMAL
GIARDIA ANTIGEN STOOL: NORMAL
REASON FOR REJECTION: NORMAL
REJECTED TEST: NORMAL
WHITE BLOOD CELLS (WBC), STOOL: NORMAL

## 2021-07-10 LAB
CULTURE, STOOL: NORMAL
OCCULT BLOOD SCREENING: NORMAL

## 2021-07-11 LAB
CALPROTECTIN, FECAL: 18 UG/G
FECAL NEUTRAL FAT: ABNORMAL
FECAL SPLIT FATS: ABNORMAL
PANCREATIC ELASTASE, FECAL: 70 UG/G

## 2021-07-30 ENCOUNTER — TELEPHONE (OUTPATIENT)
Dept: BARIATRICS/WEIGHT MGMT | Age: 66
End: 2021-07-30

## 2021-07-30 DIAGNOSIS — E86.0 DEHYDRATION: Primary | ICD-10-CM

## 2021-07-30 RX ORDER — SODIUM CHLORIDE 0.9 % (FLUSH) 0.9 %
5-40 SYRINGE (ML) INJECTION PRN
Status: CANCELLED | OUTPATIENT
Start: 2021-08-02

## 2021-07-30 RX ORDER — HEPARIN SODIUM (PORCINE) LOCK FLUSH IV SOLN 100 UNIT/ML 100 UNIT/ML
500 SOLUTION INTRAVENOUS PRN
Status: CANCELLED | OUTPATIENT
Start: 2021-08-02

## 2021-07-30 RX ORDER — SODIUM CHLORIDE 9 MG/ML
25 INJECTION, SOLUTION INTRAVENOUS PRN
Status: CANCELLED | OUTPATIENT
Start: 2021-08-02

## 2021-07-30 NOTE — TELEPHONE ENCOUNTER
Patient called in and was at kidney specialist who recommended IV infusion. Per order of Dr. Christi Nowak to schedule 2 infusions. Orders placed and faxed to infusion center [place] [facility]

## 2021-08-03 ENCOUNTER — HOSPITAL ENCOUNTER (OUTPATIENT)
Dept: INFUSION THERAPY | Age: 66
Setting detail: INFUSION SERIES
Discharge: HOME | End: 2021-08-03
Payer: MEDICARE

## 2021-08-03 VITALS
TEMPERATURE: 98 F | HEART RATE: 62 BPM | DIASTOLIC BLOOD PRESSURE: 76 MMHG | RESPIRATION RATE: 24 BRPM | OXYGEN SATURATION: 100 % | SYSTOLIC BLOOD PRESSURE: 127 MMHG

## 2021-08-03 DIAGNOSIS — E86.0 DEHYDRATION: ICD-10-CM

## 2021-08-03 DIAGNOSIS — E53.8 VITAMIN B12 DEFICIENCY: Primary | ICD-10-CM

## 2021-08-03 PROCEDURE — 6360000002 HC RX W HCPCS: Performed by: SURGERY

## 2021-08-03 PROCEDURE — 96365 THER/PROPH/DIAG IV INF INIT: CPT

## 2021-08-03 PROCEDURE — 96360 HYDRATION IV INFUSION INIT: CPT

## 2021-08-03 PROCEDURE — 2500000003 HC RX 250 WO HCPCS: Performed by: SURGERY

## 2021-08-03 PROCEDURE — 2580000003 HC RX 258: Performed by: SURGERY

## 2021-08-03 PROCEDURE — 96372 THER/PROPH/DIAG INJ SC/IM: CPT

## 2021-08-03 RX ORDER — CYANOCOBALAMIN 1000 UG/ML
1000 INJECTION INTRAMUSCULAR; SUBCUTANEOUS ONCE
Status: COMPLETED
Start: 2021-08-03 | End: 2021-08-03

## 2021-08-03 RX ORDER — HEPARIN SODIUM (PORCINE) LOCK FLUSH IV SOLN 100 UNIT/ML 100 UNIT/ML
500 SOLUTION INTRAVENOUS PRN
Status: CANCELLED | OUTPATIENT
Start: 2021-08-03

## 2021-08-03 RX ORDER — SODIUM CHLORIDE 0.9 % (FLUSH) 0.9 %
5-40 SYRINGE (ML) INJECTION PRN
Status: CANCELLED | OUTPATIENT
Start: 2021-08-03

## 2021-08-03 RX ORDER — CYANOCOBALAMIN 1000 UG/ML
1000 INJECTION INTRAMUSCULAR; SUBCUTANEOUS ONCE
Start: 2021-08-31

## 2021-08-03 RX ORDER — SODIUM CHLORIDE 9 MG/ML
25 INJECTION, SOLUTION INTRAVENOUS PRN
Status: CANCELLED | OUTPATIENT
Start: 2021-08-03

## 2021-08-03 RX ORDER — SODIUM CHLORIDE 0.9 % (FLUSH) 0.9 %
5-40 SYRINGE (ML) INJECTION PRN
Status: DISCONTINUED | OUTPATIENT
Start: 2021-08-03 | End: 2021-08-04 | Stop reason: HOSPADM

## 2021-08-03 RX ADMIN — SODIUM CHLORIDE, PRESERVATIVE FREE 10 ML: 5 INJECTION INTRAVENOUS at 10:20

## 2021-08-03 RX ADMIN — ASCORBIC ACID, VITAMIN A PALMITATE, CHOLECALCIFEROL, THIAMINE HYDROCHLORIDE, RIBOFLAVIN-5 PHOSPHATE SODIUM, PYRIDOXINE HYDROCHLORIDE, NIACINAMIDE, DEXPANTHENOL, ALPHA-TOCOPHEROL ACETATE, VITAMIN K1, FOLIC ACID, BIOTIN, CYANOCOBALAMIN: 200; 3300; 200; 6; 3.6; 6; 40; 15; 10; 150; 600; 60; 5 INJECTION, SOLUTION INTRAVENOUS at 10:55

## 2021-08-03 RX ADMIN — SODIUM CHLORIDE: 9 INJECTION, SOLUTION INTRAVENOUS at 10:21

## 2021-08-03 RX ADMIN — SODIUM CHLORIDE, PRESERVATIVE FREE 10 ML: 5 INJECTION INTRAVENOUS at 11:57

## 2021-08-03 RX ADMIN — CYANOCOBALAMIN 1000 MCG: 1000 INJECTION, SOLUTION INTRAMUSCULAR; SUBCUTANEOUS at 10:21

## 2021-08-26 ENCOUNTER — HOSPITAL ENCOUNTER (OUTPATIENT)
Dept: INFUSION THERAPY | Age: 66
Setting detail: INFUSION SERIES
Discharge: HOME | End: 2021-08-26
Payer: MEDICARE

## 2021-08-26 VITALS
OXYGEN SATURATION: 100 % | RESPIRATION RATE: 18 BRPM | HEART RATE: 95 BPM | DIASTOLIC BLOOD PRESSURE: 74 MMHG | TEMPERATURE: 97.4 F | SYSTOLIC BLOOD PRESSURE: 117 MMHG

## 2021-08-26 DIAGNOSIS — E86.0 DEHYDRATION: Primary | ICD-10-CM

## 2021-08-26 PROCEDURE — 96360 HYDRATION IV INFUSION INIT: CPT

## 2021-08-26 PROCEDURE — 2500000003 HC RX 250 WO HCPCS: Performed by: SURGERY

## 2021-08-26 PROCEDURE — 96361 HYDRATE IV INFUSION ADD-ON: CPT

## 2021-08-26 PROCEDURE — 2580000003 HC RX 258: Performed by: SURGERY

## 2021-08-26 PROCEDURE — 96365 THER/PROPH/DIAG IV INF INIT: CPT

## 2021-08-26 RX ORDER — SODIUM CHLORIDE 9 MG/ML
25 INJECTION, SOLUTION INTRAVENOUS PRN
Status: CANCELLED | OUTPATIENT
Start: 2021-08-26

## 2021-08-26 RX ORDER — SODIUM CHLORIDE 9 MG/ML
25 INJECTION, SOLUTION INTRAVENOUS PRN
Status: DISCONTINUED | OUTPATIENT
Start: 2021-08-26 | End: 2021-08-27 | Stop reason: HOSPADM

## 2021-08-26 RX ORDER — HEPARIN SODIUM (PORCINE) LOCK FLUSH IV SOLN 100 UNIT/ML 100 UNIT/ML
500 SOLUTION INTRAVENOUS PRN
OUTPATIENT
Start: 2021-08-26

## 2021-08-26 RX ADMIN — ASCORBIC ACID, VITAMIN A PALMITATE, CHOLECALCIFEROL, THIAMINE HYDROCHLORIDE, RIBOFLAVIN-5 PHOSPHATE SODIUM, PYRIDOXINE HYDROCHLORIDE, NIACINAMIDE, DEXPANTHENOL, ALPHA-TOCOPHEROL ACETATE, VITAMIN K1, FOLIC ACID, BIOTIN, CYANOCOBALAMIN: 200; 3300; 200; 6; 3.6; 6; 40; 15; 10; 150; 600; 60; 5 INJECTION, SOLUTION INTRAVENOUS at 14:50

## 2021-08-26 RX ADMIN — SODIUM CHLORIDE: 9 INJECTION, SOLUTION INTRAVENOUS at 13:49

## 2021-09-07 ENCOUNTER — TELEPHONE (OUTPATIENT)
Dept: BARIATRICS/WEIGHT MGMT | Age: 66
End: 2021-09-07

## 2021-09-07 NOTE — TELEPHONE ENCOUNTER
Patient called in to say he is doing much better now after his infusions. He is pushing his PO intake.

## 2022-05-05 ENCOUNTER — TELEPHONE (OUTPATIENT)
Dept: BARIATRICS/WEIGHT MGMT | Age: 67
End: 2022-05-05

## 2022-05-05 NOTE — TELEPHONE ENCOUNTER
Patient is due for their annual follow up and needs scheduled. I called the patient and the patient stated that he did not need to follow-up.

## 2022-07-18 ENCOUNTER — TELEPHONE (OUTPATIENT)
Dept: BARIATRICS/WEIGHT MGMT | Age: 67
End: 2022-07-18

## 2023-04-25 ENCOUNTER — TELEPHONE (OUTPATIENT)
Dept: BARIATRICS/WEIGHT MGMT | Age: 68
End: 2023-04-25

## 2023-04-25 NOTE — TELEPHONE ENCOUNTER
Patient is due for their annual follow up and needs scheduled. I called the patient and he stated he was doing great and did not want to schedule a follow-up, however thanked me for calling.

## 2023-11-05 NOTE — TELEPHONE ENCOUNTER
ED Handoff Info      Note: Please review patient's handover report in Epic under Summary tab under ED to IP Handoff      ED Nurse: Madina Benoit, RN   Extension:  (Main)      Precautions:    None      Violent Patient Behavior BPA   No data recorded      Mental Status: non-verbal and responds to painful stimuli calm & cooperative   Baseline? [] Yes [x] No      Cardiac/Tele on arrival: Normal Sinus Rhythm w/ long qtc   Baseline? [] Yes [x] No      Respiratory needs on arrival: None   Baseline? [x] Yes [] No      Medications pending and/or not given in ED: See MAR for details repeat lactulose and acetylcisteine drip      Pending Labs/Tests to be done on Unit: Repeat Lactic Acid, xray NGT confirmation      Additional Information:    Parham Catheter: [] Yes [] No [x]Not Applicable   Indication: Not applicable      Preferred Language:  English      Living Arrangement: Alone w/ roommate      COMMENTS: Arrives with [facility]. Called by roommate when pt found unresponsive on bathroom floor. Hypertensive on EMS arrival to scene, normotensive en route and on arrival without interventions. LKW 2000 11/4. On arrival to ED, Globally aphasic, localizing to pain BUE, withdraw BLE. Eyes crossing midline. Severely jaundiced appearing. Stroke alert activated and cancelled. Ammonia and lactic elevated.              Per the order of Dr. Vito Garcia, patient has been scheduled for Screening colonoscopy on 5.31.19. Patient provided with testing instructions during office visit and scheduled for follow up appointment on 6.13.19. Patient instructed to please contact our office with any questions. Patient given Golytely bowel prep instructions given    Surgery scheduling form faxed to [address] [place] [address] surgery scheduling and fax confirmation received. Dr. Vito Garcia to enter orders. Chart forwarded to MA to check if pre cert is required .

## 2024-01-24 NOTE — GROUP NOTE
Group Therapy Note    Date: 4/15/2021    Group Start Time: 1330  Group End Time: 5226  Group Topic: Cognitive Skills    [facility]    [address]        Group Therapy Note    Attendees: 4         Patient's Goal:  To gain insight into assertive communication by reviewing the worksheet Assertive Communication, reviewing skills with peers and utilizing practice examples. Notes:  Pt was came to the first part of group and left.  No noted improvement    Status After Intervention:  Unchanged    Participation Level: None    Participation Quality: Appropriate      Speech:  mute      Thought Process/Content: Logical      Affective Functioning: Flat      Mood: anxious      Level of consciousness:  Alert      Response to Learning: Capable of insight      Endings: None Reported    Modes of Intervention: Education      Discipline Responsible: /Counselor      Signature:  [address]
Group Therapy Note    Date: 4/16/2021    Group Start Time: 1000  Group End Time: 8611  Group Topic: Psychoeducation    [facility]        Group Therapy Note    Attendees: 8       Patient's Goal:  No response, only offers blank stare. Notes:  Minimally engaged during discussion on increasing motivation. Observed to be looking at handout, but unable to verbalize any comment. Shaking head when peers sharing, but unable to offer reason.     Status After Intervention:  Unchanged    Participation Level: Minimal    Participation Quality: Attentive      Speech:  hesitant      Thought Process/Content: Perseverating      Affective Functioning: Incongruent      Mood: confused      Level of consciousness:  Attentive      Response to Learning: Progressing to goal      Endings: None Reported    Modes of Intervention: Education, Support, Socialization and Exploration      Discipline Responsible: Psychoeducational Specialist      Signature:  Lisette Munoz, [address]
Group Therapy Note    Date: 4/18/2021    Group Start Time: 1100  Group End Time: 5820  Group Topic: Psychoeducation    [facility]    Stefanie Aguilera, CTRS        Group Therapy Note    Number of participants: 9  Type of group: Psychoeducation  Mode of intervention: Education, Support, Socialization, Exploration, Clarifying, and Problem-solving  Topic: Tips on Staying Healthy  Objective: Pt will identify 3 ways to stay healthy mind, body, and spirit in recovery post discharge. Patient's Goal:  Pt reports no goal.     Notes:  Pt offered minimal interaction during group but was an active listener throughout. Accepting of handout and support from peers. Status After Intervention:  Unchanged    Participation Level:  Active Listener and Minimal    Participation Quality: Appropriate and Attentive      Speech:  normal      Thought Process/Content: Perseverating      Affective Functioning: Flat      Mood: depressed      Level of consciousness:  Preoccupied      Response to Learning: Progressing to goal      Endings: None Reported    Modes of Intervention: Education, Support, Socialization, Exploration, Clarifying and Problem-solving
Universal Safety Interventions
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
## 2024-11-07 NOTE — PLAN OF CARE
Problem: Altered Mood, Manic Behavior:  Goal: Mood stable  Description: Mood stable  Outcome: Met This Shift     Problem: Altered Mood, Manic Behavior:  Goal: Ability to interact with others will improve  Description: Ability to interact with others will improve  Outcome: Not Met This Shift [Normal Breath Sounds] : Normal breath sounds [Normal Heart Sounds] : normal heart sounds [Normal Rate and Rhythm] : normal rate and rhythm [No Rash or Lesion] : No rash or lesion [Alert] : alert [Oriented to Person] : oriented to person [Oriented to Place] : oriented to place [Oriented to Time] : oriented to time [Calm] : calm [de-identified] : Healthy woman in no distress [de-identified] : CHARLI BECKER EOMI [de-identified] : Soft, nontender nondistended, positive bowel sounds in all four quads.  No hernia or masses. No rebound or guarding. [de-identified] : Ambulating without difficulty or assistance [de-identified] : 2 cm area of induration with clear draining sinus/punctum.  Posterior proximal thigh inferior gluteal fold

## (undated) DEVICE — COVER,LIGHT HANDLE,FLX,1/PK: Brand: MEDLINE INDUSTRIES, INC.

## (undated) DEVICE — SPONGE GZ 4IN 4IN 4 PLY N WVN AVANT

## (undated) DEVICE — SET ENDO INSTR LAPAROSCOPIC INCISIONAL

## (undated) DEVICE — MASK,FACE,MAXFLUIDPROTECT,SHIELD/ERLPS: Brand: MEDLINE

## (undated) DEVICE — DOUBLE BASIN SET: Brand: MEDLINE INDUSTRIES, INC.

## (undated) DEVICE — MEDI-VAC NON-CONDUCTIVE SUCTION TUBING: Brand: CARDINAL HEALTH

## (undated) DEVICE — FORCEPS BX L240CM JAW DIA2.8MM L CAP W/ NDL MIC MESH TOOTH

## (undated) DEVICE — Device: Brand: DEFENDO VALVE AND CONNECTOR KIT

## (undated) DEVICE — APPLICATOR SURG XL L38CM FOR ARISTA ABSRB HEMSTAT FLEXITIP

## (undated) DEVICE — KENDALL 450 SERIES MONITORING FOAM ELECTRODE - RECTANGULAR SHAPE ( 3/PK): Brand: KENDALL

## (undated) DEVICE — PATIENT RETURN ELECTRODE, SINGLE-USE, CONTACT QUALITY MONITORING, ADULT, WITH 9FT CORD, FOR PATIENTS WEIGING OVER 33LBS. (15KG): Brand: MEGADYNE

## (undated) DEVICE — TROCAR: Brand: KII FIOS FIRST ENTRY

## (undated) DEVICE — GOWN,SIRUS,NONRNF,SETINSLV,XL,20/CS: Brand: MEDLINE

## (undated) DEVICE — GARMENT,MEDLINE,DVT,INT,CALF,MED, GEN2: Brand: MEDLINE

## (undated) DEVICE — KIT BEDSIDE REVITAL OX 500ML

## (undated) DEVICE — 6 X 9  1.75MIL 4-WALL LABGUARD: Brand: MINIGRIP COMMERCIAL LLC

## (undated) DEVICE — MEDI-VAC YANKAUER SUCTION HANDLE W/BULBOUS TIP: Brand: CARDINAL HEALTH

## (undated) DEVICE — MARKER,SKIN,WI/RULER AND LABELS: Brand: MEDLINE

## (undated) DEVICE — TOWEL,OR,DSP,ST,BLUE,STD,6/PK,12PK/CS: Brand: MEDLINE

## (undated) DEVICE — COVER,TABLE,44X90,STERILE: Brand: MEDLINE

## (undated) DEVICE — CONTAINER SPEC COLL 960ML POLYPR TRIANG GRAD INTAKE/OUTPUT

## (undated) DEVICE — LUBRICANT SURG JELLY ST BACTER TUBE 4.25OZ

## (undated) DEVICE — NDL CNTR 40CT FM MAG: Brand: MEDLINE INDUSTRIES, INC.

## (undated) DEVICE — PMI PTFE COATED LAPAROSCOPIC WIRE L-HOOK 44 CM: Brand: PMI

## (undated) DEVICE — APPLIER CLP L SHFT DIA12MM 20 ROT MULT LIGACLP

## (undated) DEVICE — TROCAR: Brand: KII SLEEVE

## (undated) DEVICE — SUTURE V-LOC 180 SZ 0 L9IN ABSRB GRN GS-21 L37MM 1/2 CIR VLOCL0346

## (undated) DEVICE — SOLUTION IV IRRIG POUR BRL 0.9% SODIUM CHL 2F7124

## (undated) DEVICE — [HIGH FLOW INSUFFLATOR,  DO NOT USE IF PACKAGE IS DAMAGED,  KEEP DRY,  KEEP AWAY FROM SUNLIGHT,  PROTECT FROM HEAT AND RADIOACTIVE SOURCES.]: Brand: PNEUMOSURE

## (undated) DEVICE — Z INACTIVE USE 2660664 SOLUTION IRRIG 3000ML 0.9% SOD CHL USP UROMATIC PLAS CONT

## (undated) DEVICE — INSUFFLATION NEEDLE TO ESTABLISH PNEUMOPERITONEUM.: Brand: INSUFFLATION NEEDLE

## (undated) DEVICE — GRADUATE

## (undated) DEVICE — LAPAROSCOPIC SCISSORS: Brand: EPIX LAPAROSCOPIC SCISSORS

## (undated) DEVICE — GLOVE ORANGE PI 7 1/2   MSG9075

## (undated) DEVICE — BLOCK BITE 60FR CAREGUARD

## (undated) DEVICE — NEEDLE SPNL 22GA L3.5IN BLK HUB S STL REG WALL FIT STYL W/

## (undated) DEVICE — PACK SURG LAP CHOLE CUSTOM

## (undated) DEVICE — GOWN ISOLATN REG YEL M WT MULTIPLY SIDETIE LEV 2

## (undated) DEVICE — RELOAD STPL L60MM H1-2.6MM MESENTERY THN TISS WHT 6 ROW

## (undated) DEVICE — RELOAD STPL L60MM H1.5-3.6MM REG TISS BLU GRIPPING SURF B

## (undated) DEVICE — SUTURE ABSRB L6IN L37MM 0 GS-21 GRN 1/2 CIR TAPR PNT NDL VLOCL0306

## (undated) DEVICE — TUBING, SUCTION, 1/4" X 10', STRAIGHT: Brand: MEDLINE

## (undated) DEVICE — IRON INTERN

## (undated) DEVICE — CAMERA STRYKER 1488 HD GEN

## (undated) DEVICE — STAPLER SKIN L440MM 32MM LNG 12 FIRING B FRM PWR + GRIPPING

## (undated) DEVICE — PLUMEPORT LAPAROSCOPIC SMOKE FILTRATION DEVICE: Brand: PLUMEPORT ACTIV

## (undated) DEVICE — AIRLIFE™ ADULT OXYGEN MASK VINYL, UNDER THE CHIN STYLE, 3 IN 1 MASK WITH SAFETY VENT, WITH 7 FEET (2.1 M) CRUSH RESISTANT OXYGEN TUBING: Brand: AIRLIFE™

## (undated) DEVICE — EXTRA LONG 45 DEGREE LENS

## (undated) DEVICE — SHEARS LAP L45CM DIA5MM ULTRASONIC CRV TIP ADV HEMSTAS HARM

## (undated) DEVICE — CHLORAPREP 26ML ORANGE

## (undated) DEVICE — SYRINGE MED 20ML STD CLR PLAS LUERLOCK TIP N CTRL DISP

## (undated) DEVICE — PUMP SUC IRR TBNG L10FT W/ HNDPC ASSEMB STRYKEFLOW 2

## (undated) DEVICE — AGENT HEMSTAT W2XL4IN OXIDIZED REGENERATED CELOS ABSRB

## (undated) DEVICE — AIR SHEET,LAT,COMFORT GLIDE, BLEND 40X80: Brand: MEDLINE

## (undated) DEVICE — CONTAINER SPEC 480ML CLR POLYSTYR 10% NEUT BUFF FRMLN ZN